# Patient Record
Sex: MALE | Race: WHITE | NOT HISPANIC OR LATINO | Employment: OTHER | URBAN - METROPOLITAN AREA
[De-identification: names, ages, dates, MRNs, and addresses within clinical notes are randomized per-mention and may not be internally consistent; named-entity substitution may affect disease eponyms.]

---

## 2018-01-03 ENCOUNTER — ALLSCRIPTS OFFICE VISIT (OUTPATIENT)
Dept: OTHER | Facility: OTHER | Age: 83
End: 2018-01-03

## 2018-01-03 LAB
BILIRUB UR QL STRIP: NORMAL
CLARITY UR: NORMAL
COLOR UR: YELLOW
GLUCOSE (HISTORICAL): NORMAL
HGB UR QL STRIP.AUTO: NORMAL
KETONES UR STRIP-MCNC: NORMAL MG/DL
LEUKOCYTE ESTERASE UR QL STRIP: NORMAL
NITRITE UR QL STRIP: NORMAL
PH UR STRIP.AUTO: 7.5 [PH]
PROT UR STRIP-MCNC: NORMAL MG/DL
SP GR UR STRIP.AUTO: 1.01
UROBILINOGEN UR QL STRIP.AUTO: 0.2

## 2018-01-22 VITALS
DIASTOLIC BLOOD PRESSURE: 76 MMHG | SYSTOLIC BLOOD PRESSURE: 130 MMHG | WEIGHT: 191 LBS | HEIGHT: 70 IN | BODY MASS INDEX: 27.35 KG/M2

## 2018-02-06 ENCOUNTER — OFFICE VISIT (OUTPATIENT)
Dept: UROLOGY | Facility: MEDICAL CENTER | Age: 83
End: 2018-02-06
Payer: COMMERCIAL

## 2018-02-06 VITALS
HEIGHT: 70 IN | DIASTOLIC BLOOD PRESSURE: 76 MMHG | SYSTOLIC BLOOD PRESSURE: 130 MMHG | BODY MASS INDEX: 27.2 KG/M2 | WEIGHT: 190 LBS

## 2018-02-06 DIAGNOSIS — R33.9 INCOMPLETE BLADDER EMPTYING: Primary | ICD-10-CM

## 2018-02-06 DIAGNOSIS — R31.0 GROSS HEMATURIA: ICD-10-CM

## 2018-02-06 LAB
POST-VOID RESIDUAL VOLUME, ML POC: 86 ML
SL AMB  POCT GLUCOSE, UA: ABNORMAL
SL AMB LEUKOCYTE ESTERASE,UA: ABNORMAL
SL AMB POCT BILIRUBIN,UA: ABNORMAL
SL AMB POCT BLOOD,UA: ABNORMAL
SL AMB POCT CLARITY,UA: CLEAR
SL AMB POCT COLOR,UA: YELLOW
SL AMB POCT KETONES,UA: ABNORMAL
SL AMB POCT NITRITE,UA: ABNORMAL
SL AMB POCT PH,UA: 6.5
SL AMB POCT SPECIFIC GRAVITY,UA: 1.01
SL AMB POCT URINE PROTEIN: ABNORMAL
SL AMB POCT UROBILINOGEN: 0.2

## 2018-02-06 PROCEDURE — 81003 URINALYSIS AUTO W/O SCOPE: CPT | Performed by: UROLOGY

## 2018-02-06 PROCEDURE — 99214 OFFICE O/P EST MOD 30 MIN: CPT | Performed by: UROLOGY

## 2018-02-06 PROCEDURE — 51798 US URINE CAPACITY MEASURE: CPT | Performed by: UROLOGY

## 2018-02-06 RX ORDER — CEPHALEXIN 500 MG/1
500 CAPSULE ORAL EVERY 12 HOURS SCHEDULED
Qty: 14 CAPSULE | Refills: 0 | Status: SHIPPED | OUTPATIENT
Start: 2018-02-06 | End: 2018-02-13

## 2018-02-06 RX ORDER — TAMSULOSIN HYDROCHLORIDE 0.4 MG/1
CAPSULE ORAL
COMMUNITY
Start: 2018-02-05 | End: 2019-09-27 | Stop reason: SDUPTHER

## 2018-02-06 RX ORDER — CHOLECALCIFEROL (VITAMIN D3) 125 MCG
500 CAPSULE ORAL
COMMUNITY
End: 2020-08-12

## 2018-02-06 RX ORDER — LOSARTAN POTASSIUM 50 MG/1
TABLET ORAL
COMMUNITY
End: 2020-08-12 | Stop reason: SDUPTHER

## 2018-02-06 RX ORDER — OMEPRAZOLE 40 MG/1
CAPSULE, DELAYED RELEASE ORAL
COMMUNITY
End: 2020-08-13 | Stop reason: SDUPTHER

## 2018-02-06 RX ORDER — SIMVASTATIN 10 MG
TABLET ORAL
COMMUNITY
Start: 2018-01-09 | End: 2020-08-13

## 2018-02-06 NOTE — PATIENT INSTRUCTIONS
Hematuria   WHAT YOU NEED TO KNOW:   What is hematuria? Hematuria is blood in your urine  Your urine may be bright red to dark brown  What other signs and symptoms might I have with hematuria? · Fever     · Nausea and vomiting     · Pain or bruising on your lower back or sides     · Pain when you urinate     · More urination than usual, or the need to urinate right away  What causes hematuria? Ask your healthcare provider for more information about these and other causes of hematuria:  · Urinary tract infection    · Kidney or bladder stones    · Swollen prostate    · Kidney disease    · Abdomen or pelvic injury    · Kidney, bladder, or prostate cancer    · Intense exercise  How is hematuria diagnosed? Your healthcare provider will ask when you first saw a change in the color of your urine  Tell him about any medical conditions or medicines you take  Some medicines can damage your kidneys or increase your risk for bleeding  You may need any of the following:  · Blood and urine tests  may show infection and how well your kidneys are working  · An x-ray, ultrasound, or CT  may show the cause of your hematuria  You may be given contrast liquid to help your urinary tract show up better in the pictures  Tell the healthcare provider if you have ever had an allergic reaction to contrast liquid  How is hematuria treated? Hematuria may go away without treatment  You may need medicines to treat an infection  Treatment depends on the cause of your hematuria  Ask your healthcare provider for more information about the treatment you may need  How can I manage my symptoms? Drink liquids as directed  You may need to drink extra liquids to help flush the blood from your body through your urine  Water is the best liquid to drink  Ask how much liquid to drink each day and which liquids are best for you  When should I seek immediate care? · You have blood in your urine after a new injury, such as a fall       · You are urinating very small amounts or not at all  · You feel like you cannot empty your bladder  · You have severe back or side pain that does not go away with treatment  When should I contact my healthcare provider? · You have a fever that gets worse or does not go away with treatment  · You cannot keep liquids or medicines down  · Your urine gets darker, even after you drink extra liquids  · You have questions or concerns about your condition, treatment, or care  CARE AGREEMENT:   You have the right to help plan your care  Learn about your health condition and how it may be treated  Discuss treatment options with your caregivers to decide what care you want to receive  You always have the right to refuse treatment  The above information is an  only  It is not intended as medical advice for individual conditions or treatments  Talk to your doctor, nurse or pharmacist before following any medical regimen to see if it is safe and effective for you  © 2017 [employer] Information is for End User's use only and may not be sold, redistributed or otherwise used for commercial purposes  All illustrations and images included in CareNotes® are the copyrighted property of A D A M [employer]  or [employer]

## 2018-02-06 NOTE — LETTER
February 6, 2018     Teresa Mcnamara MD  [address]  [address] [facility] [address] [facility] [address]    Patient: Kristan Estrada   YOB: 1935   Date of Visit: 2/6/2018       Dear Dr Carrera Schools: Thank you for referring Kristan Estrada to me for evaluation  Below are my notes for this consultation  If you have questions, please do not hesitate to call me  I look forward to following your patient along with you           Sincerely,        Tim Cowart MD        CC: No Recipients

## 2018-02-06 NOTE — ASSESSMENT & PLAN NOTE
This is a new problem  The patient is a smoker  We will workup with CT scan and cystoscopy  We will also check a urine culture and empirically begin antibiotic therapy

## 2018-02-06 NOTE — PROGRESS NOTES
Assessment/Plan:    Gross hematuria  This is a new problem  The patient is a smoker  We will workup with CT scan and cystoscopy  We will also check a urine culture and empirically begin antibiotic therapy  Diagnoses and all orders for this visit:    Incomplete bladder emptying  -     POCT Measure PVR    Gross hematuria  -     POCT urine dip auto non-scope  -     Urine culture  -     CT abdomen pelvis w wo contrast; Future  -     cephalexin (KEFLEX) 500 mg capsule; Take 1 capsule (500 mg total) by mouth every 12 (twelve) hours for 7 days    Other orders  -     VENTOLIN  (90 Base) MCG/ACT inhaler;   -     aspirin 81 MG tablet; Take 81 mg by mouth  -     CHOLECALCIFEROL PO; Take 1,000 Units by mouth  -     cyanocobalamin (VITAMIN B-12) 500 mcg tablet; Take 500 mcg by mouth  -     tamsulosin (FLOMAX) 0 4 mg;   -     simvastatin (ZOCOR) 10 mg tablet; 1 po 2 x per week  -     omeprazole (PriLOSEC) 40 MG capsule; Take by mouth  -     metFORMIN (GLUCOPHAGE) 500 mg tablet; Take by mouth  -     losartan (COZAAR) 50 mg tablet; Take by mouth          Subjective:      Patient ID: Jhoan Thomas is a [de-identified] y o  male  21-year-old male who developed urinary frequency,  urgency and gross hematuria last night  He felt he could not empty his bladder well  No fever or flank pain  He continued to void frequently although he does note that his symptoms are improving  His gross  hematuria has resolved  At the present time he feels his bladder emptying is improving  The following portions of the patient's history were reviewed and updated as appropriate: allergies, current medications, past family history, past medical history, past social history, past surgical history and problem list     Review of Systems   Constitutional: Negative for chills, diaphoresis, fatigue and fever  HENT: Negative  Eyes: Negative  Respiratory: Negative  Cardiovascular: Negative  Endocrine: Negative      Musculoskeletal: Negative  Skin: Negative  Allergic/Immunologic: Negative  Neurological: Negative  Hematological: Negative  Psychiatric/Behavioral: Negative  Objective:     Physical Exam   Constitutional: He is oriented to person, place, and time  He appears well-developed and well-nourished  HENT:   Head: Normocephalic and atraumatic  Eyes: Conjunctivae are normal    Neck: Neck supple  Cardiovascular: Normal rate  Pulmonary/Chest: Effort normal    Abdominal: Soft  He exhibits no distension and no mass  There is no tenderness  There is no rebound and no guarding  No CVA tenderness   Genitourinary: Penis normal    Genitourinary Comments: Testes descended and nontender   Neurological: He is alert and oriented to person, place, and time  Skin: Skin is warm and dry  Psychiatric: He has a normal mood and affect   His behavior is normal  Judgment and thought content normal

## 2018-02-08 ENCOUNTER — APPOINTMENT (OUTPATIENT)
Dept: LAB | Facility: MEDICAL CENTER | Age: 83
End: 2018-02-08
Attending: UROLOGY
Payer: COMMERCIAL

## 2018-02-08 ENCOUNTER — TRANSCRIBE ORDERS (OUTPATIENT)
Dept: ADMINISTRATIVE | Facility: HOSPITAL | Age: 83
End: 2018-02-08

## 2018-02-08 DIAGNOSIS — R31.0 GROSS HEMATURIA: ICD-10-CM

## 2018-02-08 LAB
ANION GAP SERPL CALCULATED.3IONS-SCNC: 7 MMOL/L (ref 4–13)
BUN SERPL-MCNC: 19 MG/DL (ref 5–25)
CALCIUM SERPL-MCNC: 9.3 MG/DL (ref 8.3–10.1)
CHLORIDE SERPL-SCNC: 103 MMOL/L (ref 100–108)
CO2 SERPL-SCNC: 29 MMOL/L (ref 21–32)
CREAT SERPL-MCNC: 1.06 MG/DL (ref 0.6–1.3)
GFR SERPL CREATININE-BSD FRML MDRD: 66 ML/MIN/1.73SQ M
GLUCOSE P FAST SERPL-MCNC: 118 MG/DL (ref 65–99)
POTASSIUM SERPL-SCNC: 4 MMOL/L (ref 3.5–5.3)
SODIUM SERPL-SCNC: 139 MMOL/L (ref 136–145)

## 2018-02-08 PROCEDURE — 36415 COLL VENOUS BLD VENIPUNCTURE: CPT

## 2018-02-08 PROCEDURE — 87086 URINE CULTURE/COLONY COUNT: CPT | Performed by: UROLOGY

## 2018-02-08 PROCEDURE — 80048 BASIC METABOLIC PNL TOTAL CA: CPT

## 2018-02-09 LAB — BACTERIA UR CULT: NORMAL

## 2018-02-15 ENCOUNTER — HOSPITAL ENCOUNTER (OUTPATIENT)
Dept: CT IMAGING | Facility: HOSPITAL | Age: 83
Discharge: HOME | End: 2018-02-15
Attending: UROLOGY
Payer: COMMERCIAL

## 2018-02-15 DIAGNOSIS — R31.0 GROSS HEMATURIA: ICD-10-CM

## 2018-02-15 PROCEDURE — 74178 CT ABD&PLV WO CNTR FLWD CNTR: CPT

## 2018-02-15 RX ADMIN — IOHEXOL 100 ML: 350 INJECTION, SOLUTION INTRAVENOUS at 08:41

## 2018-02-21 ENCOUNTER — TELEPHONE (OUTPATIENT)
Dept: UROLOGY | Facility: AMBULATORY SURGERY CENTER | Age: 83
End: 2018-02-21

## 2018-02-21 NOTE — TELEPHONE ENCOUNTER
Spoke to patient who wanted to know what the next step is  Looking at Dr Eliza Hooper last note PT was to schedule a Cysto  Pt made an appt 3/7

## 2018-03-06 ENCOUNTER — PROCEDURE VISIT (OUTPATIENT)
Dept: UROLOGY | Facility: MEDICAL CENTER | Age: 83
End: 2018-03-06
Payer: COMMERCIAL

## 2018-03-06 VITALS
HEIGHT: 70 IN | WEIGHT: 190 LBS | BODY MASS INDEX: 27.2 KG/M2 | SYSTOLIC BLOOD PRESSURE: 116 MMHG | DIASTOLIC BLOOD PRESSURE: 70 MMHG

## 2018-03-06 DIAGNOSIS — N40.1 BENIGN PROSTATIC HYPERPLASIA WITH URINARY OBSTRUCTION: ICD-10-CM

## 2018-03-06 DIAGNOSIS — R31.0 GROSS HEMATURIA: Primary | ICD-10-CM

## 2018-03-06 DIAGNOSIS — N13.8 BENIGN PROSTATIC HYPERPLASIA WITH URINARY OBSTRUCTION: ICD-10-CM

## 2018-03-06 PROBLEM — E11.9 DM2 (DIABETES MELLITUS, TYPE 2) (HCC): Status: ACTIVE | Noted: 2017-12-27

## 2018-03-06 PROBLEM — K21.9 CHRONIC GERD: Status: ACTIVE | Noted: 2017-12-27

## 2018-03-06 LAB
SL AMB  POCT GLUCOSE, UA: NORMAL
SL AMB LEUKOCYTE ESTERASE,UA: NORMAL
SL AMB POCT BILIRUBIN,UA: NORMAL
SL AMB POCT BLOOD,UA: NORMAL
SL AMB POCT CLARITY,UA: CLEAR
SL AMB POCT COLOR,UA: YELLOW
SL AMB POCT KETONES,UA: NORMAL
SL AMB POCT NITRITE,UA: NORMAL
SL AMB POCT PH,UA: 6
SL AMB POCT SPECIFIC GRAVITY,UA: 1.02
SL AMB POCT URINE PROTEIN: NORMAL
SL AMB POCT UROBILINOGEN: 0.2

## 2018-03-06 PROCEDURE — 81003 URINALYSIS AUTO W/O SCOPE: CPT | Performed by: UROLOGY

## 2018-03-06 PROCEDURE — 99213 OFFICE O/P EST LOW 20 MIN: CPT | Performed by: UROLOGY

## 2018-03-06 PROCEDURE — 52000 CYSTOURETHROSCOPY: CPT | Performed by: UROLOGY

## 2018-03-06 PROCEDURE — 4040F PNEUMOC VAC/ADMIN/RCVD: CPT | Performed by: UROLOGY

## 2018-03-06 NOTE — ASSESSMENT & PLAN NOTE
CT scan reveals only bladder wall thickening  A urine culture is negative  Cystoscopy reveals prostatic enlargement and vascularity without any evidence of bladder tumor  Most likely etiology of the gross hematuria was Infectious or prostatic enlargement

## 2018-03-06 NOTE — ASSESSMENT & PLAN NOTE
He remains on TAMSULOSIN  We did discuss adding finasteride however at this point he is satisfied with his voiding pattern  Would consider this further if he has recurrent episodes of gross hematuria or if he is unhappy with his voiding pattern  Alternatively, could consider transurethral resection of the prostate  He will return in 1 year  He will call if bleeding recurs or if he is unhappy with his voiding pattern  We discussed the pros and cons of PSA testing and age [de-identified]

## 2018-03-06 NOTE — PROGRESS NOTES
Assessment/Plan:    Gross hematuria  CT scan reveals only bladder wall thickening  A urine culture is negative  Cystoscopy reveals prostatic enlargement and vascularity without any evidence of bladder tumor  Most likely etiology of the gross hematuria was Infectious or prostatic enlargement  Benign prostatic hyperplasia with urinary obstruction  He remains on TAMSULOSIN  We did discuss adding finasteride however at this point he is satisfied with his voiding pattern  Would consider this further if he has recurrent episodes of gross hematuria or if he is unhappy with his voiding pattern  Alternatively, could consider transurethral resection of the prostate  He will return in 1 year  He will call if bleeding recurs or if he is unhappy with his voiding pattern  We discussed the pros and cons of PSA testing and age [de-identified]  Diagnoses and all orders for this visit:    Gross hematuria  -     POCT urine dip auto non-scope    Benign prostatic hyperplasia with urinary obstruction    Other orders  -     Cystoscopy          Subjective:      Patient ID: Marco Flores is a [de-identified] y o  male  19-year-old male followed for lower tract symptoms who recently noted gross hematuria  He was treated with antibiotic and he reports that the hematuria has completely resolved  He notes occasional episodes of incomplete bladder emptying  His stream is adequate  He continues to take TAMSULOSIN  He he gets up at most once a night to urinate  He is generally satisfied with his voiding pattern  He returns today for cystoscopy  The following portions of the patient's history were reviewed and updated as appropriate: allergies, current medications, past family history, past medical history, past social history, past surgical history and problem list     Review of Systems   Constitutional: Negative for chills, diaphoresis, fatigue and fever  HENT: Negative  Eyes: Negative  Respiratory: Negative      Cardiovascular: Negative  Endocrine: Negative  Musculoskeletal: Negative  Skin: Negative  Allergic/Immunologic: Negative  Neurological: Negative  Hematological: Negative  Psychiatric/Behavioral: Negative  Objective:      /70 (BP Location: Left arm, Patient Position: Sitting)   Ht 5' 10" (1 778 m)   Wt 86 2 kg (190 lb)   BMI 27 26 kg/m²          Physical Exam   Constitutional: He is oriented to person, place, and time  He appears well-developed and well-nourished  HENT:   Head: Normocephalic and atraumatic  Neck: Neck supple  Cardiovascular: Normal rate  Pulmonary/Chest: Effort normal    Abdominal: Soft  He exhibits no distension and no mass  There is no tenderness  There is no rebound  Genitourinary: Penis normal    Neurological: He is alert and oriented to person, place, and time  Skin: Skin is warm and dry  Psychiatric: He has a normal mood and affect  His behavior is normal  Judgment and thought content normal    Vitals reviewed  Cystoscopy  Date/Time: 3/6/2018 10:36 AM  Performed by: More Cavanaugh  Authorized by: More Cavanaugh     Procedure details: cystoscopy    Patient tolerance: Patient tolerated the procedure well with no immediate complications    Additional Procedure Details: Cystoscopy Procedure Note        Pre-operative Diagnosis: Hematuria    Post-operative Diagnosis:  Prostatic enlargement  No evidence of bladder tumor      Procedure Details   The risks, benefits, complications, treatment options, and expected outcomes were discussed with the patient  The patient concurred with the proposed plan, giving informed consent  Cystoscopy was performed today under local anesthesia, using sterile technique  The patient was placed in the supine position, prepped and draped in the usual sterile fashion  A 15 Divehi flexible cystoscope  was used to inspect both the urethra and bladder    Findings:  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

## 2018-03-06 NOTE — PATIENT INSTRUCTIONS

## 2018-03-06 NOTE — LETTER
March 6, 2018     Patsie Brunner, MD  [address]  [address] [facility] [address] [facility] [address]    Patient: Jennifer Bautista   YOB: 1935   Date of Visit: 3/6/2018       Dear Dr Xin Rutledge: Thank you for referring Jennifer Bautista to me for evaluation  Below are my notes for this consultation  If you have questions, please do not hesitate to call me  I look forward to following your patient along with you           Sincerely,        Salima Marquez MD        CC: No Recipients

## 2018-12-26 ENCOUNTER — OFFICE VISIT (OUTPATIENT)
Dept: UROLOGY | Facility: MEDICAL CENTER | Age: 83
End: 2018-12-26
Payer: COMMERCIAL

## 2018-12-26 ENCOUNTER — TELEPHONE (OUTPATIENT)
Dept: UROLOGY | Facility: MEDICAL CENTER | Age: 83
End: 2018-12-26

## 2018-12-26 VITALS
BODY MASS INDEX: 26.48 KG/M2 | HEIGHT: 70 IN | HEART RATE: 84 BPM | DIASTOLIC BLOOD PRESSURE: 70 MMHG | WEIGHT: 185 LBS | SYSTOLIC BLOOD PRESSURE: 130 MMHG

## 2018-12-26 DIAGNOSIS — N40.1 BPH WITH URINARY OBSTRUCTION: ICD-10-CM

## 2018-12-26 DIAGNOSIS — R35.0 FREQUENCY OF URINATION: Primary | ICD-10-CM

## 2018-12-26 DIAGNOSIS — N52.8 OTHER MALE ERECTILE DYSFUNCTION: ICD-10-CM

## 2018-12-26 DIAGNOSIS — N13.8 BPH WITH URINARY OBSTRUCTION: ICD-10-CM

## 2018-12-26 LAB
SL AMB  POCT GLUCOSE, UA: ABNORMAL
SL AMB LEUKOCYTE ESTERASE,UA: ABNORMAL
SL AMB POCT BILIRUBIN,UA: ABNORMAL
SL AMB POCT BLOOD,UA: ABNORMAL
SL AMB POCT CLARITY,UA: CLEAR
SL AMB POCT COLOR,UA: YELLOW
SL AMB POCT KETONES,UA: ABNORMAL
SL AMB POCT NITRITE,UA: ABNORMAL
SL AMB POCT PH,UA: 7.5
SL AMB POCT SPECIFIC GRAVITY,UA: 1.01
SL AMB POCT URINE PROTEIN: ABNORMAL
SL AMB POCT UROBILINOGEN: 0.2

## 2018-12-26 PROCEDURE — 87077 CULTURE AEROBIC IDENTIFY: CPT | Performed by: UROLOGY

## 2018-12-26 PROCEDURE — 81003 URINALYSIS AUTO W/O SCOPE: CPT | Performed by: UROLOGY

## 2018-12-26 PROCEDURE — 87086 URINE CULTURE/COLONY COUNT: CPT | Performed by: UROLOGY

## 2018-12-26 PROCEDURE — 99214 OFFICE O/P EST MOD 30 MIN: CPT | Performed by: UROLOGY

## 2018-12-26 PROCEDURE — 87186 SC STD MICRODIL/AGAR DIL: CPT | Performed by: UROLOGY

## 2018-12-26 RX ORDER — FINASTERIDE 5 MG/1
5 TABLET, FILM COATED ORAL DAILY
Qty: 90 TABLET | Refills: 3 | Status: SHIPPED | OUTPATIENT
Start: 2018-12-26 | End: 2019-09-27 | Stop reason: SDUPTHER

## 2018-12-26 RX ORDER — SULFAMETHOXAZOLE AND TRIMETHOPRIM 800; 160 MG/1; MG/1
1 TABLET ORAL EVERY 12 HOURS SCHEDULED
Qty: 14 TABLET | Refills: 0 | Status: SHIPPED | OUTPATIENT
Start: 2018-12-26 | End: 2019-01-02

## 2018-12-26 NOTE — LETTER
December 26, 2018     Faustina Mujica MD  [address]  [address] [place] [address],[address] [facility] [address]    Patient: Jeremiah Jon   YOB: 1935   Date of Visit: 12/26/2018       Dear Dr Herrera Hutson: Thank you for referring Jeremiah Jon to me for evaluation  Below are my notes for this consultation  If you have questions, please do not hesitate to call me  I look forward to following your patient along with you  Sincerely,        Mabelene Kocher, MD        CC: No Recipients  Mabelene Kocher, MD  12/26/2018 11:20 AM  Sign at close encounter  Assessment/Plan:  Possible UTI, culture and start Bactrim twice per day for seven days    Add finasteride to his Flomax    Keep routine follow-up  No problem-specific Assessment & Plan notes found for this encounter  Diagnoses and all orders for this visit:    Frequency of urination  -     POCT urine dip auto non-scope  -     Urine culture  -     sulfamethoxazole-trimethoprim (BACTRIM DS) 800-160 mg per tablet; Take 1 tablet by mouth every 12 (twelve) hours for 7 days    BPH with urinary obstruction  -     finasteride (PROSCAR) 5 mg tablet; Take 1 tablet (5 mg total) by mouth daily    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          Subjective:      Patient ID: Jeremiah Jon is a 82 y o  male  Several days of morning time tremendous urgency voiding small amounts frequently dysuria  Symptoms get slightly better toward later in the day, but this is definitely different than his usual BPH frequency and slower flow  Feels just like infection he had two years ago  See prior cysto note described enlarged prostate, and options for therapy  Patient is not sure if the Flomax has helped very much          The following portions of the patient's history were reviewed and updated as appropriate: allergies, current medications, past family history, past medical history, past social history, past surgical history and problem list     Review of Systems   All other systems reviewed and are negative  Objective:      /70 (BP Location: Left arm, Patient Position: Sitting, Cuff Size: Adult)   Pulse 84   Ht 5' 10" (1 778 m)   Wt 83 9 kg (185 lb)   BMI 26 54 kg/m²           Physical Exam   Constitutional: He is oriented to person, place, and time  He appears well-developed and well-nourished  No distress  HENT:   Head: Normocephalic and atraumatic  Eyes: Conjunctivae are normal    Cardiovascular: Normal rate and regular rhythm  Pulmonary/Chest: Effort normal and breath sounds normal  No respiratory distress  He has no wheezes  Abdominal: Soft  Bowel sounds are normal  He exhibits no distension and no mass  There is no tenderness  Neurological: He is alert and oriented to person, place, and time  Skin: Skin is warm and dry  He is not diaphoretic

## 2018-12-26 NOTE — PROGRESS NOTES
Assessment/Plan:  Possible UTI, culture and start Bactrim twice per day for seven days    Add finasteride to his Flomax    Keep routine follow-up  No problem-specific Assessment & Plan notes found for this encounter  Diagnoses and all orders for this visit:    Frequency of urination  -     POCT urine dip auto non-scope  -     Urine culture  -     sulfamethoxazole-trimethoprim (BACTRIM DS) 800-160 mg per tablet; Take 1 tablet by mouth every 12 (twelve) hours for 7 days    BPH with urinary obstruction  -     finasteride (PROSCAR) 5 mg tablet; Take 1 tablet (5 mg total) by mouth daily    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          Subjective:      Patient ID: Nani Rodrigues is a 82 y o  male  Several days of morning time tremendous urgency voiding small amounts frequently dysuria  Symptoms get slightly better toward later in the day, but this is definitely different than his usual BPH frequency and slower flow  Feels just like infection he had two years ago  See prior cysto note described enlarged prostate, and options for therapy  Patient is not sure if the Flomax has helped very much  The following portions of the patient's history were reviewed and updated as appropriate: allergies, current medications, past family history, past medical history, past social history, past surgical history and problem list     Review of Systems   All other systems reviewed and are negative  Objective:      /70 (BP Location: Left arm, Patient Position: Sitting, Cuff Size: Adult)   Pulse 84   Ht 5' 10" (1 778 m)   Wt 83 9 kg (185 lb)   BMI 26 54 kg/m²          Physical Exam   Constitutional: He is oriented to person, place, and time  He appears well-developed and well-nourished  No distress  HENT:   Head: Normocephalic and atraumatic  Eyes: Conjunctivae are normal    Cardiovascular: Normal rate and regular rhythm  Pulmonary/Chest: Effort normal and breath sounds normal  No respiratory distress  He has no wheezes  Abdominal: Soft  Bowel sounds are normal  He exhibits no distension and no mass  There is no tenderness  Neurological: He is alert and oriented to person, place, and time  Skin: Skin is warm and dry  He is not diaphoretic

## 2018-12-26 NOTE — TELEPHONE ENCOUNTER
Leandrew Males questioning script for Bactrim and if pt had recent blood work potassium level checked?

## 2018-12-28 LAB — BACTERIA UR CULT: ABNORMAL

## 2019-03-12 ENCOUNTER — OFFICE VISIT (OUTPATIENT)
Dept: UROLOGY | Facility: MEDICAL CENTER | Age: 84
End: 2019-03-12
Payer: COMMERCIAL

## 2019-03-12 VITALS
SYSTOLIC BLOOD PRESSURE: 140 MMHG | HEART RATE: 64 BPM | BODY MASS INDEX: 26.48 KG/M2 | HEIGHT: 70 IN | DIASTOLIC BLOOD PRESSURE: 82 MMHG | WEIGHT: 185 LBS

## 2019-03-12 DIAGNOSIS — N13.8 BPH WITH URINARY OBSTRUCTION: Primary | ICD-10-CM

## 2019-03-12 DIAGNOSIS — N40.1 BPH WITH URINARY OBSTRUCTION: Primary | ICD-10-CM

## 2019-03-12 PROBLEM — R31.0 GROSS HEMATURIA: Status: RESOLVED | Noted: 2018-02-06 | Resolved: 2019-03-12

## 2019-03-12 LAB
SL AMB  POCT GLUCOSE, UA: NORMAL
SL AMB LEUKOCYTE ESTERASE,UA: NORMAL
SL AMB POCT BILIRUBIN,UA: NORMAL
SL AMB POCT BLOOD,UA: NORMAL
SL AMB POCT CLARITY,UA: CLEAR
SL AMB POCT COLOR,UA: YELLOW
SL AMB POCT KETONES,UA: NORMAL
SL AMB POCT NITRITE,UA: NORMAL
SL AMB POCT PH,UA: 7
SL AMB POCT SPECIFIC GRAVITY,UA: 1.02
SL AMB POCT URINE PROTEIN: NORMAL
SL AMB POCT UROBILINOGEN: 0.2

## 2019-03-12 PROCEDURE — 99214 OFFICE O/P EST MOD 30 MIN: CPT | Performed by: UROLOGY

## 2019-03-12 PROCEDURE — 81003 URINALYSIS AUTO W/O SCOPE: CPT | Performed by: UROLOGY

## 2019-03-12 NOTE — ASSESSMENT & PLAN NOTE
The patient was placed on finasteride in December after he had a urinary infection  He is now on combined therapy  AUA symptom score is 18  He is mixed about his quality of life  Urinalysis today is negative  I discussed other options for therapy including minimally invasive treatments like urolift  Information was given  The patient will return in 6 months  By that time the finasteride should be fully effective  He will call if problems arise

## 2019-03-12 NOTE — PATIENT INSTRUCTIONS
Benign Prostatic Hypertrophy   WHAT YOU NEED TO KNOW:   Benign prostatic hypertrophy (BPH) is a condition that causes your prostate gland to grow larger than normal  The prostate gland is the male sex gland that produces a fluid that is part of semen  It is about the size of a walnut and it is located under the bladder  As the prostate grows, it can squeeze the urethra  This can block urine flow and cause urinary problems  DISCHARGE INSTRUCTIONS:   Medicines:   · Alpha blockers: This medicine relaxes the muscles in your prostate and bladder  It may help you urinate more easily  · 5 alpha reductase inhibitors: These medicines block the production of a hormone that causes the prostate to get larger  It may help slow the growth of the prostate or shrink the prostate  · Take your medicine as directed  Contact your healthcare provider if you think your medicine is not helping or if you have side effects  Tell him or her if you are allergic to any medicine  Keep a list of the medicines, vitamins, and herbs you take  Include the amounts, and when and why you take them  Bring the list or the pill bottles to follow-up visits  Carry your medicine list with you in case of an emergency  Follow up with your healthcare provider as directed:  Write down your questions so you remember to ask them during your visits  Manage BPH:   · Do not let your bladder get too full before you empty it  Urinate when you feel the urge  · Limit alcohol  Do not drink large amounts of any liquid at one time  · Decrease the amount of salt you eat  Examples of salty foods are chips, cured meats, and canned soups  Do not use table salt  · Healthcare providers may tell you not to eat spicy foods such as chilli peppers  This may help you find out if spicy food makes your BPH symptoms worse  · You may have sex if you feel well  Contact your healthcare provider if:   · There is a large amount of blood in your urine  · Your signs and symptoms get worse  · You have a fever  · You have questions or concerns about your condition or care  Seek care immediately if:   · You are unable to urinate  · Your bladder feels very full and painful  © 2017 [employer] Information is for End User's use only and may not be sold, redistributed or otherwise used for commercial purposes  All illustrations and images included in CareNotes® are the copyrighted property of A [employer] M [employer]  or Micky Lucia  The above information is an  only  It is not intended as medical advice for individual conditions or treatments  Talk to your doctor, nurse or pharmacist before following any medical regimen to see if it is safe and effective for you    Urolift information was given

## 2019-03-12 NOTE — LETTER
March 12, 2019     Perla Roach MD  [id number] [facility] [address]    Patient: Lela Gifford   YOB: 1935   Date of Visit: 3/12/2019       Dear Dr Raven De Jesus: Thank you for referring Lela Gifford to me for evaluation  Below are my notes for this consultation  If you have questions, please do not hesitate to call me  I look forward to following your patient along with you  Sincerely,        Cyn Rodas MD        CC: No Recipients  Cyn Rodas MD  3/12/2019 10:50 AM  Sign at close encounter  Assessment/Plan:    BPH with urinary obstruction  The patient was placed on finasteride in December after he had a urinary infection  He is now on combined therapy  AUA symptom score is 18  He is mixed about his quality of life  Urinalysis today is negative  I discussed other options for therapy including minimally invasive treatments like urolift  Information was given  The patient will return in 6 months  By that time the finasteride should be fully effective  He will call if problems arise  Diagnoses and all orders for this visit:    BPH with urinary obstruction  -     POCT urine dip auto non-scope          Subjective:      Patient ID: Lela Gifford is a 82 y o  male  Benign Prostatic Hypertrophy   This is a chronic problem  The current episode started more than 1 year ago  The problem has been gradually improving since onset  Irritative symptoms do not include frequency or urgency  nocturia 1-0, occasional urgency  Obstructive symptoms include a slower stream  Obstructive symptoms do not include an intermittent stream, straining or a weak stream  Pertinent negatives include no chills, dysuria, genital pain, hematuria, hesitancy, nausea or vomiting  AUA score is 8-19  His sexual activity is non-contributory to the current illness  The symptoms are aggravated by caffeine  Past treatments include finasteride and tamsulosin   The treatment provided mild relief  The following portions of the patient's history were reviewed and updated as appropriate: allergies, current medications, past family history, past medical history, past social history, past surgical history and problem list     Review of Systems   Constitutional: Negative  Negative for chills, diaphoresis, fatigue and fever  HENT: Negative  Eyes: Negative  Respiratory: Negative  Cardiovascular: Negative  Gastrointestinal: Negative  Negative for nausea and vomiting  Endocrine: Negative  Genitourinary: Negative for dysuria, frequency, hematuria, hesitancy and urgency  See HPI   Musculoskeletal: Negative  Skin: Negative  Allergic/Immunologic: Negative  Neurological: Negative  Hematological: Negative  Psychiatric/Behavioral: Negative  AUA SYMPTOM SCORE      Most Recent Value   AUA SYMPTOM SCORE   How often have you had a sensation of not emptying your bladder completely after you finished urinating? 4   How often have you had to urinate again less than two hours after you finished urinating? 5   How often have you found you stopped and started again several times when you urinate? 2   How often have you found it difficult to postpone urination? 3   How often have you had a weak urinary stream?  3   How often have you had to push or strain to begin urination? 0   How many times did you most typically get up to urinate from the time you went to bed at night until the time you got up in the morning? 1   Quality of Life: If you were to spend the rest of your life with your urinary condition just the way it is now, how would you feel about that?  3   AUA SYMPTOM SCORE  18        Objective:      /82 (BP Location: Left arm, Patient Position: Sitting, Cuff Size: Adult)   Pulse 64   Ht 5' 10" (1 778 m)   Wt 83 9 kg (185 lb)   BMI 26 54 kg/m²           Physical Exam   Constitutional: He is oriented to person, place, and time   He appears well-developed and well-nourished  HENT:   Head: Normocephalic and atraumatic  Eyes: Conjunctivae are normal    Neck: Neck supple  Cardiovascular: Normal rate  Pulmonary/Chest: Effort normal    Abdominal: Soft  Bowel sounds are normal  He exhibits no distension and no mass  There is no tenderness  There is no rebound, no guarding and no CVA tenderness  Genitourinary: Rectum normal, testes normal and penis normal  Right testis shows no mass  Left testis shows no mass  No phimosis or hypospadias  Genitourinary Comments: Prostate moderately enlarged and palpably benign   Musculoskeletal: He exhibits no edema  Neurological: He is alert and oriented to person, place, and time  Skin: Skin is warm and dry  Psychiatric: He has a normal mood and affect  His behavior is normal  Judgment and thought content normal    Vitals reviewed

## 2019-03-12 NOTE — PROGRESS NOTES
Assessment/Plan:    BPH with urinary obstruction  The patient was placed on finasteride in December after he had a urinary infection  He is now on combined therapy  AUA symptom score is 18  He is mixed about his quality of life  Urinalysis today is negative  I discussed other options for therapy including minimally invasive treatments like urolift  Information was given  The patient will return in 6 months  By that time the finasteride should be fully effective  He will call if problems arise  Diagnoses and all orders for this visit:    BPH with urinary obstruction  -     POCT urine dip auto non-scope          Subjective:      Patient ID: Deanna Nieves is a 82 y o  male  Benign Prostatic Hypertrophy   This is a chronic problem  The current episode started more than 1 year ago  The problem has been gradually improving since onset  Irritative symptoms do not include frequency or urgency  nocturia 1-0, occasional urgency  Obstructive symptoms include a slower stream  Obstructive symptoms do not include an intermittent stream, straining or a weak stream  Pertinent negatives include no chills, dysuria, genital pain, hematuria, hesitancy, nausea or vomiting  AUA score is 8-19  His sexual activity is non-contributory to the current illness  The symptoms are aggravated by caffeine  Past treatments include finasteride and tamsulosin  The treatment provided mild relief  The following portions of the patient's history were reviewed and updated as appropriate: allergies, current medications, past family history, past medical history, past social history, past surgical history and problem list     Review of Systems   Constitutional: Negative  Negative for chills, diaphoresis, fatigue and fever  HENT: Negative  Eyes: Negative  Respiratory: Negative  Cardiovascular: Negative  Gastrointestinal: Negative  Negative for nausea and vomiting  Endocrine: Negative      Genitourinary: Negative for dysuria, frequency, hematuria, hesitancy and urgency  See HPI   Musculoskeletal: Negative  Skin: Negative  Allergic/Immunologic: Negative  Neurological: Negative  Hematological: Negative  Psychiatric/Behavioral: Negative  AUA SYMPTOM SCORE      Most Recent Value   AUA SYMPTOM SCORE   How often have you had a sensation of not emptying your bladder completely after you finished urinating? 4   How often have you had to urinate again less than two hours after you finished urinating? 5   How often have you found you stopped and started again several times when you urinate? 2   How often have you found it difficult to postpone urination? 3   How often have you had a weak urinary stream?  3   How often have you had to push or strain to begin urination? 0   How many times did you most typically get up to urinate from the time you went to bed at night until the time you got up in the morning? 1   Quality of Life: If you were to spend the rest of your life with your urinary condition just the way it is now, how would you feel about that?  3   AUA SYMPTOM SCORE  18        Objective:      /82 (BP Location: Left arm, Patient Position: Sitting, Cuff Size: Adult)   Pulse 64   Ht 5' 10" (1 778 m)   Wt 83 9 kg (185 lb)   BMI 26 54 kg/m²          Physical Exam   Constitutional: He is oriented to person, place, and time  He appears well-developed and well-nourished  HENT:   Head: Normocephalic and atraumatic  Eyes: Conjunctivae are normal    Neck: Neck supple  Cardiovascular: Normal rate  Pulmonary/Chest: Effort normal    Abdominal: Soft  Bowel sounds are normal  He exhibits no distension and no mass  There is no tenderness  There is no rebound, no guarding and no CVA tenderness  Genitourinary: Rectum normal, testes normal and penis normal  Right testis shows no mass  Left testis shows no mass  No phimosis or hypospadias     Genitourinary Comments: Prostate moderately enlarged and palpably benign   Musculoskeletal: He exhibits no edema  Neurological: He is alert and oriented to person, place, and time  Skin: Skin is warm and dry  Psychiatric: He has a normal mood and affect  His behavior is normal  Judgment and thought content normal    Vitals reviewed

## 2019-09-27 ENCOUNTER — OFFICE VISIT (OUTPATIENT)
Dept: UROLOGY | Facility: MEDICAL CENTER | Age: 84
End: 2019-09-27
Payer: COMMERCIAL

## 2019-09-27 VITALS
WEIGHT: 182 LBS | HEART RATE: 62 BPM | SYSTOLIC BLOOD PRESSURE: 128 MMHG | BODY MASS INDEX: 26.05 KG/M2 | HEIGHT: 70 IN | DIASTOLIC BLOOD PRESSURE: 62 MMHG

## 2019-09-27 DIAGNOSIS — N40.1 BPH WITH URINARY OBSTRUCTION: ICD-10-CM

## 2019-09-27 DIAGNOSIS — N13.8 BPH WITH URINARY OBSTRUCTION: ICD-10-CM

## 2019-09-27 PROCEDURE — 99214 OFFICE O/P EST MOD 30 MIN: CPT | Performed by: UROLOGY

## 2019-09-27 RX ORDER — RIBOFLAVIN (VITAMIN B2) 100 MG
100 TABLET ORAL DAILY
COMMUNITY
End: 2020-08-12

## 2019-09-27 RX ORDER — TAMSULOSIN HYDROCHLORIDE 0.4 MG/1
0.4 CAPSULE ORAL
Qty: 90 CAPSULE | Refills: 3 | Status: SHIPPED | OUTPATIENT
Start: 2019-09-27 | End: 2020-09-29 | Stop reason: SDUPTHER

## 2019-09-27 RX ORDER — FINASTERIDE 5 MG/1
5 TABLET, FILM COATED ORAL DAILY
Qty: 90 TABLET | Refills: 3 | Status: SHIPPED | OUTPATIENT
Start: 2019-09-27 | End: 2020-09-29 | Stop reason: SDUPTHER

## 2019-09-27 NOTE — ASSESSMENT & PLAN NOTE
AUA symptom score is 10 on combined therapy  He is mixed about his voiding pattern but he does not wish to consider any invasive or minimally invasive treatment at this time  His prescriptions were refilled  We will continue to follow him and he will return in 1 year

## 2019-09-27 NOTE — PATIENT INSTRUCTIONS
Benign Prostatic Hypertrophy   WHAT YOU NEED TO KNOW:   Benign prostatic hypertrophy (BPH) is a condition that causes your prostate gland to grow larger than normal  The prostate gland is the male sex gland that produces a fluid that is part of semen  It is about the size of a walnut and it is located under the bladder  As the prostate grows, it can squeeze the urethra  This can block urine flow and cause urinary problems  DISCHARGE INSTRUCTIONS:   Medicines:   · Alpha blockers: This medicine relaxes the muscles in your prostate and bladder  It may help you urinate more easily  · 5 alpha reductase inhibitors: These medicines block the production of a hormone that causes the prostate to get larger  It may help slow the growth of the prostate or shrink the prostate  · Take your medicine as directed  Contact your healthcare provider if you think your medicine is not helping or if you have side effects  Tell him or her if you are allergic to any medicine  Keep a list of the medicines, vitamins, and herbs you take  Include the amounts, and when and why you take them  Bring the list or the pill bottles to follow-up visits  Carry your medicine list with you in case of an emergency  Follow up with your healthcare provider as directed:  Write down your questions so you remember to ask them during your visits  Manage BPH:   · Do not let your bladder get too full before you empty it  Urinate when you feel the urge  · Limit alcohol  Do not drink large amounts of any liquid at one time  · Decrease the amount of salt you eat  Examples of salty foods are chips, cured meats, and canned soups  Do not use table salt  · Healthcare providers may tell you not to eat spicy foods such as chilli peppers  This may help you find out if spicy food makes your BPH symptoms worse  · You may have sex if you feel well  Contact your healthcare provider if:   · There is a large amount of blood in your urine  · Your signs and symptoms get worse  · You have a fever  · You have questions or concerns about your condition or care  Seek care immediately if:   · You are unable to urinate  · Your bladder feels very full and painful  © 2017 [employer] Information is for End User's use only and may not be sold, redistributed or otherwise used for commercial purposes  All illustrations and images included in CareNotes® are the copyrighted property of A D [employer] M [employer]  or [employer]  The above information is an  only  It is not intended as medical advice for individual conditions or treatments  Talk to your doctor, nurse or pharmacist before following any medical regimen to see if it is safe and effective for you

## 2019-09-27 NOTE — LETTER
September 27, 2019     Marisa Ramirez MD  [address]  [address] [address] [facility] [address]    Patient: Gabriela Santos   YOB: 1935   Date of Visit: 9/27/2019       Dear Dr Ismael Salinas: Thank you for referring Gabriela Santos to me for evaluation  Below are my notes for this consultation  If you have questions, please do not hesitate to call me  I look forward to following your patient along with you  Sincerely,        Mary Ellen Masters MD        CC: No Recipients  Mary Ellen Masters MD  9/27/2019 10:05 AM  Sign at close encounter  Assessment/Plan:    BPH with urinary obstruction  AUA symptom score is 10 on combined therapy  He is mixed about his voiding pattern but he does not wish to consider any invasive or minimally invasive treatment at this time  His prescriptions were refilled  We will continue to follow him and he will return in 1 year  Diagnoses and all orders for this visit:    BPH with urinary obstruction  -     tamsulosin (FLOMAX) 0 4 mg; Take 1 capsule (0 4 mg total) by mouth daily with dinner  -     finasteride (PROSCAR) 5 mg tablet; Take 1 tablet (5 mg total) by mouth daily    Other orders  -     Ascorbic Acid (VITAMIN C) 100 MG tablet; Take 100 mg by mouth daily          Subjective:      Patient ID: Gabriela Santos is a 82 y o  male  Benign Prostatic Hypertrophy   This is a chronic problem  The current episode started more than 1 year ago  The problem has been gradually improving since onset  Irritative symptoms include frequency  Irritative symptoms do not include nocturia or urgency  Nocturia 0-2  Obstructive symptoms include a slower stream  Obstructive symptoms do not include dribbling, incomplete emptying, an intermittent stream, straining or a weak stream  Pertinent negatives include no chills, dysuria, genital pain, hematuria, hesitancy, nausea or vomiting  AUA score is 8-19  His sexual activity is non-contributory to the current illness   The symptoms are aggravated by caffeine  Past treatments include finasteride and tamsulosin  The treatment provided moderate relief  The following portions of the patient's history were reviewed and updated as appropriate: allergies, current medications, past family history, past medical history, past social history, past surgical history and problem list     Review of Systems   Constitutional: Negative for chills, diaphoresis, fatigue and fever  HENT: Negative  Eyes: Negative  Respiratory: Positive for cough and shortness of breath  Cardiovascular: Negative  Gastrointestinal: Negative  Negative for nausea and vomiting  GERD sx   Endocrine: Negative  Genitourinary: Positive for frequency  Negative for dysuria, hematuria, hesitancy, incomplete emptying, nocturia and urgency  See HPI   Musculoskeletal: Negative  Skin: Negative  Allergic/Immunologic: Negative  Neurological: Negative  Hematological: Negative  Psychiatric/Behavioral: Negative  AUA SYMPTOM SCORE      Most Recent Value   AUA SYMPTOM SCORE   How often have you had a sensation of not emptying your bladder completely after you finished urinating? 2   How often have you had to urinate again less than two hours after you finished urinating? 4   How often have you found you stopped and started again several times when you urinate? 1   How often have you found it difficult to postpone urination? 1   How often have you had a weak urinary stream?  1   How often have you had to push or strain to begin urination? 0   How many times did you most typically get up to urinate from the time you went to bed at night until the time you got up in the morning?   1   Quality of Life: If you were to spend the rest of your life with your urinary condition just the way it is now, how would you feel about that?  3   AUA SYMPTOM SCORE  10        Objective:      /62 (BP Location: Left arm, Patient Position: Sitting, Cuff Size: Adult)   Pulse 62   Ht 5' 10" (1 778 m)   Wt 82 6 kg (182 lb)   BMI 26 11 kg/m²           Physical Exam   Constitutional: He is oriented to person, place, and time  He appears well-developed and well-nourished  HENT:   Head: Normocephalic and atraumatic  Eyes: Conjunctivae are normal    Neck: Neck supple  Cardiovascular: Normal rate  Pulmonary/Chest: Effort normal    Abdominal: He exhibits no distension and no mass  There is no tenderness  There is no rebound and no guarding  No hernia  No hernia   Genitourinary: Penis normal    Genitourinary Comments: Testes descended and normal to palpation   Neurological: He is alert and oriented to person, place, and time  Skin: Skin is warm and dry  Psychiatric: He has a normal mood and affect   His behavior is normal  Judgment and thought content normal

## 2019-09-27 NOTE — PROGRESS NOTES
Assessment/Plan:    BPH with urinary obstruction  AUA symptom score is 10 on combined therapy  He is mixed about his voiding pattern but he does not wish to consider any invasive or minimally invasive treatment at this time  His prescriptions were refilled  We will continue to follow him and he will return in 1 year  Diagnoses and all orders for this visit:    BPH with urinary obstruction  -     tamsulosin (FLOMAX) 0 4 mg; Take 1 capsule (0 4 mg total) by mouth daily with dinner  -     finasteride (PROSCAR) 5 mg tablet; Take 1 tablet (5 mg total) by mouth daily    Other orders  -     Ascorbic Acid (VITAMIN C) 100 MG tablet; Take 100 mg by mouth daily          Subjective:      Patient ID: Alia Espinoza is a 82 y o  male  Benign Prostatic Hypertrophy   This is a chronic problem  The current episode started more than 1 year ago  The problem has been gradually improving since onset  Irritative symptoms include frequency  Irritative symptoms do not include nocturia or urgency  Nocturia 0-2  Obstructive symptoms include a slower stream  Obstructive symptoms do not include dribbling, incomplete emptying, an intermittent stream, straining or a weak stream  Pertinent negatives include no chills, dysuria, genital pain, hematuria, hesitancy, nausea or vomiting  AUA score is 8-19  His sexual activity is non-contributory to the current illness  The symptoms are aggravated by caffeine  Past treatments include finasteride and tamsulosin  The treatment provided moderate relief  The following portions of the patient's history were reviewed and updated as appropriate: allergies, current medications, past family history, past medical history, past social history, past surgical history and problem list     Review of Systems   Constitutional: Negative for chills, diaphoresis, fatigue and fever  HENT: Negative  Eyes: Negative  Respiratory: Positive for cough and shortness of breath  Cardiovascular: Negative  Gastrointestinal: Negative  Negative for nausea and vomiting  GERD sx   Endocrine: Negative  Genitourinary: Positive for frequency  Negative for dysuria, hematuria, hesitancy, incomplete emptying, nocturia and urgency  See HPI   Musculoskeletal: Negative  Skin: Negative  Allergic/Immunologic: Negative  Neurological: Negative  Hematological: Negative  Psychiatric/Behavioral: Negative  AUA SYMPTOM SCORE      Most Recent Value   AUA SYMPTOM SCORE   How often have you had a sensation of not emptying your bladder completely after you finished urinating? 2   How often have you had to urinate again less than two hours after you finished urinating? 4   How often have you found you stopped and started again several times when you urinate? 1   How often have you found it difficult to postpone urination? 1   How often have you had a weak urinary stream?  1   How often have you had to push or strain to begin urination? 0   How many times did you most typically get up to urinate from the time you went to bed at night until the time you got up in the morning? 1   Quality of Life: If you were to spend the rest of your life with your urinary condition just the way it is now, how would you feel about that?  3   AUA SYMPTOM SCORE  10        Objective:      /62 (BP Location: Left arm, Patient Position: Sitting, Cuff Size: Adult)   Pulse 62   Ht 5' 10" (1 778 m)   Wt 82 6 kg (182 lb)   BMI 26 11 kg/m²          Physical Exam   Constitutional: He is oriented to person, place, and time  He appears well-developed and well-nourished  HENT:   Head: Normocephalic and atraumatic  Eyes: Conjunctivae are normal    Neck: Neck supple  Cardiovascular: Normal rate  Pulmonary/Chest: Effort normal    Abdominal: He exhibits no distension and no mass  There is no tenderness  There is no rebound and no guarding  No hernia     No hernia   Genitourinary: Penis normal    Genitourinary Comments: Testes descended and normal to palpation   Neurological: He is alert and oriented to person, place, and time  Skin: Skin is warm and dry  Psychiatric: He has a normal mood and affect   His behavior is normal  Judgment and thought content normal

## 2019-11-19 ENCOUNTER — ANESTHESIA EVENT (OUTPATIENT)
Dept: GASTROENTEROLOGY | Facility: HOSPITAL | Age: 84
End: 2019-11-19

## 2019-11-19 VITALS — BODY MASS INDEX: 25.54 KG/M2 | WEIGHT: 178 LBS

## 2019-11-19 RX ORDER — SODIUM CHLORIDE 9 MG/ML
50 INJECTION, SOLUTION INTRAVENOUS CONTINUOUS
Status: CANCELLED | OUTPATIENT
Start: 2019-11-19

## 2019-11-20 ENCOUNTER — ANESTHESIA (OUTPATIENT)
Dept: GASTROENTEROLOGY | Facility: HOSPITAL | Age: 84
End: 2019-11-20

## 2019-11-20 ENCOUNTER — HOSPITAL ENCOUNTER (OUTPATIENT)
Dept: GASTROENTEROLOGY | Facility: HOSPITAL | Age: 84
Setting detail: OUTPATIENT SURGERY
Discharge: HOME | End: 2019-11-20
Attending: INTERNAL MEDICINE

## 2019-12-05 NOTE — PRE-PROCEDURE INSTRUCTIONS
No outpatient medications have been marked as taking for the 12/9/19 encounter [facility] Encounter) with Estee Jin [phone] 
yes

## 2019-12-09 ENCOUNTER — HOSPITAL ENCOUNTER (OUTPATIENT)
Dept: GASTROENTEROLOGY | Facility: HOSPITAL | Age: 84
Setting detail: OUTPATIENT SURGERY
Discharge: HOME | End: 2019-12-09
Attending: INTERNAL MEDICINE | Admitting: INTERNAL MEDICINE
Payer: COMMERCIAL

## 2019-12-09 VITALS
RESPIRATION RATE: 18 BRPM | TEMPERATURE: 97.9 F | DIASTOLIC BLOOD PRESSURE: 81 MMHG | HEART RATE: 54 BPM | OXYGEN SATURATION: 99 % | SYSTOLIC BLOOD PRESSURE: 187 MMHG

## 2019-12-09 DIAGNOSIS — K21.00 GASTRO-ESOPHAGEAL REFLUX DISEASE WITH ESOPHAGITIS: ICD-10-CM

## 2019-12-09 DIAGNOSIS — R05.9 COUGH: ICD-10-CM

## 2019-12-09 DIAGNOSIS — K22.70 BARRETT'S ESOPHAGUS WITHOUT DYSPLASIA: ICD-10-CM

## 2019-12-09 PROCEDURE — 88342 IMHCHEM/IMCYTCHM 1ST ANTB: CPT | Performed by: PATHOLOGY

## 2019-12-09 PROCEDURE — 88360 TUMOR IMMUNOHISTOCHEM/MANUAL: CPT | Performed by: PATHOLOGY

## 2019-12-09 PROCEDURE — 88305 TISSUE EXAM BY PATHOLOGIST: CPT | Performed by: PATHOLOGY

## 2019-12-09 PROCEDURE — 88341 IMHCHEM/IMCYTCHM EA ADD ANTB: CPT | Performed by: PATHOLOGY

## 2019-12-09 RX ORDER — LIDOCAINE HYDROCHLORIDE 10 MG/ML
INJECTION, SOLUTION INFILTRATION; PERINEURAL AS NEEDED
Status: DISCONTINUED | OUTPATIENT
Start: 2019-12-09 | End: 2019-12-09 | Stop reason: SURG

## 2019-12-09 RX ORDER — SODIUM CHLORIDE 9 MG/ML
125 INJECTION, SOLUTION INTRAVENOUS CONTINUOUS
Status: DISCONTINUED | OUTPATIENT
Start: 2019-12-09 | End: 2019-12-13 | Stop reason: HOSPADM

## 2019-12-09 RX ORDER — SODIUM CHLORIDE 9 MG/ML
50 INJECTION, SOLUTION INTRAVENOUS CONTINUOUS
Status: DISCONTINUED | OUTPATIENT
Start: 2019-12-09 | End: 2019-12-13 | Stop reason: HOSPADM

## 2019-12-09 RX ORDER — PROPOFOL 10 MG/ML
INJECTION, EMULSION INTRAVENOUS AS NEEDED
Status: DISCONTINUED | OUTPATIENT
Start: 2019-12-09 | End: 2019-12-09 | Stop reason: SURG

## 2019-12-09 RX ORDER — SODIUM CHLORIDE 9 MG/ML
INJECTION, SOLUTION INTRAVENOUS CONTINUOUS PRN
Status: DISCONTINUED | OUTPATIENT
Start: 2019-12-09 | End: 2019-12-09 | Stop reason: SURG

## 2019-12-09 RX ADMIN — PROPOFOL 100 MG: 10 INJECTION, EMULSION INTRAVENOUS at 09:19

## 2019-12-09 RX ADMIN — SODIUM CHLORIDE: 0.9 INJECTION, SOLUTION INTRAVENOUS at 09:25

## 2019-12-09 RX ADMIN — LIDOCAINE HYDROCHLORIDE 50 MG: 10 INJECTION, SOLUTION INFILTRATION; PERINEURAL at 09:19

## 2019-12-09 RX ADMIN — SODIUM CHLORIDE: 0.9 INJECTION, SOLUTION INTRAVENOUS at 09:11

## 2019-12-09 NOTE — DISCHARGE INSTRUCTIONS
Please call [phone] with any problems  There appears to be a growth in the distal esophagus  My office should call you to arrange prompt follow-up next week    In the meantime make sure any food is cut finely etc

## 2019-12-09 NOTE — ANESTHESIA POSTPROCEDURE EVALUATION
Post-Op Assessment Note    CV Status:  Stable  Pain Score: 0    Pain management: adequate     Mental Status:  Alert and awake   Hydration Status:  Euvolemic   PONV Controlled:  Controlled   Airway Patency:  Patent   Post Op Vitals Reviewed: Yes      Staff: CRNA           BP     Temp     Pulse     Resp      SpO2

## 2019-12-09 NOTE — INTERVAL H&P NOTE
H&P reviewed  After examining the patient I find no changes in the patients condition since the H&P had been written      Vitals:    12/09/19 0745   BP: (!) 179/82   Pulse: 69   Resp: 16   Temp: (!) 97 2 °F (36 2 °C)   SpO2: 98%

## 2020-08-11 PROBLEM — I48.0 PAROXYSMAL ATRIAL FIBRILLATION (HCC): Status: ACTIVE | Noted: 2020-06-10

## 2020-08-11 PROBLEM — R63.4 WEIGHT LOSS: Status: ACTIVE | Noted: 2020-06-10

## 2020-08-11 PROBLEM — C15.9 MALIGNANT NEOPLASM OF ESOPHAGUS (HCC): Status: ACTIVE | Noted: 2020-01-17

## 2020-08-11 PROBLEM — C15.5 PRIMARY ADENOCARCINOMA OF DISTAL THIRD OF ESOPHAGUS (HCC): Status: ACTIVE | Noted: 2019-12-09

## 2020-08-11 RX ORDER — DILTIAZEM HYDROCHLORIDE 120 MG/1
120 CAPSULE, EXTENDED RELEASE ORAL DAILY
COMMUNITY
Start: 2020-05-25 | End: 2020-08-13

## 2020-08-11 RX ORDER — AMIODARONE HYDROCHLORIDE 200 MG/1
200 TABLET ORAL
COMMUNITY
Start: 2020-05-25 | End: 2020-08-12

## 2020-08-11 RX ORDER — SUCRALFATE ORAL 1 G/10ML
1 SUSPENSION ORAL 4 TIMES DAILY
COMMUNITY
Start: 2020-03-19 | End: 2020-08-12

## 2020-08-12 ENCOUNTER — OFFICE VISIT (OUTPATIENT)
Dept: FAMILY MEDICINE CLINIC | Facility: CLINIC | Age: 85
End: 2020-08-12
Payer: COMMERCIAL

## 2020-08-12 VITALS
HEIGHT: 70 IN | RESPIRATION RATE: 16 BRPM | TEMPERATURE: 98 F | HEART RATE: 60 BPM | SYSTOLIC BLOOD PRESSURE: 130 MMHG | DIASTOLIC BLOOD PRESSURE: 68 MMHG | BODY MASS INDEX: 19.13 KG/M2 | WEIGHT: 133.6 LBS

## 2020-08-12 DIAGNOSIS — K21.9 CHRONIC GERD: ICD-10-CM

## 2020-08-12 DIAGNOSIS — N13.8 BPH WITH URINARY OBSTRUCTION: ICD-10-CM

## 2020-08-12 DIAGNOSIS — I10 ESSENTIAL HYPERTENSION: ICD-10-CM

## 2020-08-12 DIAGNOSIS — C15.5 PRIMARY ADENOCARCINOMA OF DISTAL THIRD OF ESOPHAGUS (HCC): Primary | ICD-10-CM

## 2020-08-12 DIAGNOSIS — E11.9 TYPE 2 DIABETES MELLITUS WITHOUT COMPLICATION, WITHOUT LONG-TERM CURRENT USE OF INSULIN (HCC): ICD-10-CM

## 2020-08-12 DIAGNOSIS — D50.8 IRON DEFICIENCY ANEMIA SECONDARY TO INADEQUATE DIETARY IRON INTAKE: ICD-10-CM

## 2020-08-12 DIAGNOSIS — K90.9 VITAMIN B12 DEFICIENCY DUE TO INTESTINAL MALABSORPTION: ICD-10-CM

## 2020-08-12 DIAGNOSIS — R63.4 WEIGHT LOSS: ICD-10-CM

## 2020-08-12 DIAGNOSIS — E53.8 VITAMIN B12 DEFICIENCY DUE TO INTESTINAL MALABSORPTION: ICD-10-CM

## 2020-08-12 DIAGNOSIS — I48.0 PAROXYSMAL ATRIAL FIBRILLATION (HCC): ICD-10-CM

## 2020-08-12 DIAGNOSIS — N40.1 BPH WITH URINARY OBSTRUCTION: ICD-10-CM

## 2020-08-12 DIAGNOSIS — F17.200 TOBACCO USE DISORDER: ICD-10-CM

## 2020-08-12 PROCEDURE — 3044F HG A1C LEVEL LT 7.0%: CPT | Performed by: FAMILY MEDICINE

## 2020-08-12 PROCEDURE — 3725F SCREEN DEPRESSION PERFORMED: CPT | Performed by: FAMILY MEDICINE

## 2020-08-12 PROCEDURE — 4040F PNEUMOC VAC/ADMIN/RCVD: CPT | Performed by: FAMILY MEDICINE

## 2020-08-12 PROCEDURE — 1101F PT FALLS ASSESS-DOCD LE1/YR: CPT | Performed by: FAMILY MEDICINE

## 2020-08-12 PROCEDURE — 4010F ACE/ARB THERAPY RXD/TAKEN: CPT | Performed by: FAMILY MEDICINE

## 2020-08-12 PROCEDURE — 4004F PT TOBACCO SCREEN RCVD TLK: CPT | Performed by: FAMILY MEDICINE

## 2020-08-12 PROCEDURE — 1160F RVW MEDS BY RX/DR IN RCRD: CPT | Performed by: FAMILY MEDICINE

## 2020-08-12 PROCEDURE — 3288F FALL RISK ASSESSMENT DOCD: CPT | Performed by: FAMILY MEDICINE

## 2020-08-12 PROCEDURE — 3075F SYST BP GE 130 - 139MM HG: CPT | Performed by: FAMILY MEDICINE

## 2020-08-12 PROCEDURE — 3078F DIAST BP <80 MM HG: CPT | Performed by: FAMILY MEDICINE

## 2020-08-12 PROCEDURE — 99204 OFFICE O/P NEW MOD 45 MIN: CPT | Performed by: FAMILY MEDICINE

## 2020-08-12 RX ORDER — LOSARTAN POTASSIUM 50 MG/1
50 TABLET ORAL DAILY
Qty: 90 TABLET | Refills: 3 | Status: SHIPPED | OUTPATIENT
Start: 2020-08-12 | End: 2021-02-02

## 2020-08-12 NOTE — PROGRESS NOTES
Assessment and Plan: This is the 1st time I am meeting this pleasant 24-year-old white gentleman  He presents with his wife today who adds to his history  Patient has a history GERD, Castellanos's esophagus, hypertension, hyperlipidemia, diabetes and tobaccoism and has been followed at [address] [facility] for many years  Earlier this year, he had routine EGD for surveillance, but also had noted increased dysphagia and hiccups  EGD revealed friable esophagus and tumor which was in fact invasive adenocarcinoma the esophagus  He was evaluated at [address] where he received chemo and radiation, and subsequently had robotic surgery at [facility], 5/2020  The surgery went well  The postop period was complicated by paroxysmal atrial fibrillation  Patient was placed on amiodarone and has since followed up with his cardiologist in the [facility] [place], Dr Jesu Godinez  Since then, the amiodarone has been stopped  He was scheduled to have Zio patch placed for 2 weeks but declined this  He has lost almost 50 pounds since the onset  He has managed to keep his weight stable for the past few months, but is only tolerating a liquid diet  His wife is quite concerned about his nutritional status and weight  Since discharge, he notes he has not been on B12 supplements, Vit D supplements,iron supplements or his simvastatin  He is curious to know what his BW will reveal   He is currently on Omeprazole 40 mg BID    More recently, he has stopped smoking completely  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  His last EGD was performed by Dr Val Lemus  Current concerns today include:    1  Difficulty catching his breath at times, especially after eating, and shortness of breath on exertion  Both of these have improved dramatically in the past 3 months since surgery  2  Not being able to advance his diet; gags on bigger meals    3  One episode of dumping syndrome    4   BP was running high and losartan recently increased to 50mg daily by Cardiology  1  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 - status post thoracotom, esophagectomy with gastric pull-through  [facility]  Patient has lost 50 lb  He is maintaining his weight at around 1:33 a m  He is still on a liquid diet and has been unable to advance it due to gagging  Wife is concerned about his nutritional status  Blood work will be ordered today including B12, iron, vitamin D, vitamin-C  Patient will follow-up with oncology for routine surveillance  Of note, because of te gastric pull through, he may be experiencing some shortness of breath as his "stomach" is now probably in the chest cavity  This has been explained to patient previously and he is in agreement and does admit that symptoms are improving  Follow-up with me in 1 month to review  2  Paroxysmal Afib - this occurred in the postop period and appears to have subsequently subsided  Amiodarone was stopped  After the washout  , plan was to place him on a Zio patch, however he declined this  We discussed this at length today and the possible risks of clot formation with paroxysmal atrial fib  He may want to reconsider the Zio patch  He will contact his cardiologist to discuss this further  3  Hypertension - blood pressure has been acceptable with the increase in losartan  Continue 50 mg daily  4  Diabetes - for now, patient remains on metformin 500 mg b i d  I suspect, with his recent weight loss, he may not need this medication and we will be able to follow his sugars and A1c  Await blood work  5  BPH with obstruction - symptoms remain controlled on finasteride and tamsulosin  Follow-up with Dr Gentry Dennison  6  Chronic GERD - continue omeprazole 40 mg b i d     7  Tobaccoism - patient has stopped smoking  8  History of vitamin-D deficiency, B12 deficiency, hyperlipidemia, and iron deficiency    Will check blood work and patient to return to office in 1 month to review  8  Iron deficiency anemia - check iron, ferirtin, TIBC    9  Vitamin B12 deficiency due to malabsorption - off supplement right now  Check B12 and methylmalonic acid  10, Healthcare maintenance - patient is due for Medicare wellness visit, diabetic foot exam, iris, hemoglobin A1c  This will be done at his next visit in 1 month  45 minutes spent with patient today reviewing all of the above, with greater than 50% spent on counseling and care coordination  All questions were answered at length  Past medical, social, surgical history reviewed  Allergies reviewed  Current med list reviewed    Labs and follow-up in 1 month  Subjective:      Patient ID: Kristan Estrada is a 82 y o  male  CC:    Chief Complaint   Patient presents with   [facility]      pt here as a new pt to establish care  AUDELIA Oconnell       HPI:    This is the 1st time I am meeting this pleasant 33-year-old white gentleman  He presents with his wife today who adds to his history  Patient has a history GERD, Castellanos's esophagus, hypertension, hyperlipidemia, diabetes and tobaccoism and has been followed at [facility] primary care for many years  Earlier this year, he had routine EGD for surveillance, but also had noted increased dysphagia and hiccups  EGD revealed friable esophagus and tumor which was in fact invasive adenocarcinoma the esophagus  He was evaluated at [address] [place] [address] where he received chemo and radiation, and subsequently had robotic surgery at [facility], 5/2020  The surgery went well  The postop period was complicated by paroxysmal atrial fibrillation  Patient was placed on amiodarone and has since followed up with his cardiologist in the [facility], Dr Sarah Cha  Since then, the amiodarone has been stopped  He was scheduled to have Zio patch placed for 2 weeks but declined this        He has lost almost 50 pounds since the onset  He has managed to keep his weight stable for the past few months, but is only tolerating a liquid diet  His wife is quite concerned about his nutritional status and weight  Since discharge, he notes he has not been on B12 supplements, Vit D supplements,iron supplements or his simvastatin  He is curious to know what his BW will reveal   He is currently on Omeprazole 40 mg BID    More recently, he has stopped smoking completely  He sees Dr Genesis Sherman for BPH with obstruction  His last EGD was performed by Dr Toan Ty  Current concerns today include:    1  Difficulty catching his breath at times, especially after eating, and shortness of breath on exertion  Both of these have improved dramatically in the past 3 months since surgery  2  Not being able to advance his diet; gags on bigger meals    3  One episode of dumping syndrome    4  BP was running high and losartan recently increased to 50mg daily by Cardiology  5  He requests refills of losartan and metoprolol today  The following portions of the patient's history were reviewed and updated as appropriate: allergies, current medications, past family history, past medical history, past social history, past surgical history and problem list       Review of Systems   Constitutional:        See HPI   HENT: Negative for congestion, ear pain, mouth sores, sinus pressure and trouble swallowing  Eyes: Negative for discharge, redness and itching  Respiratory: Negative for apnea, cough, chest tightness, shortness of breath, wheezing and stridor  See HPI   Cardiovascular: Negative for chest pain, palpitations and leg swelling  Gastrointestinal: Negative for abdominal distention, abdominal pain, blood in stool, constipation, diarrhea, nausea and vomiting  See HPI   Endocrine: Negative for cold intolerance and heat intolerance          See HPI   Genitourinary: Negative for difficulty urinating, dysuria, flank pain and urgency  Musculoskeletal: Negative for arthralgias and myalgias  Skin: Negative for rash  Neurological: Negative for dizziness, seizures, syncope, speech difficulty, weakness, light-headedness, numbness and headaches  Hematological: Negative for adenopathy  Psychiatric/Behavioral: Negative for agitation, behavioral problems, confusion and sleep disturbance  The patient is not nervous/anxious  Mood is good  He is frustrated by not being able to eat what he wants to, but he has positive motivation and ambition and denies feelings of despair or hopelessness, SI or HI  Notes family is very supportive and close-knit  Data to review: Multiple hospital admission notes, consultation reports, labs, imaging  Objective:    Vitals:    08/12/20 1006   BP: 130/68   BP Location: Left arm   Patient Position: Sitting   Cuff Size: Large   Pulse: 60   Resp: 16   Temp: 98 °F (36 7 °C)   TempSrc: Temporal   Weight: 60 6 kg (133 lb 9 6 oz)   Height: 5' 10" (1 778 m)        Physical Exam  Constitutional:       General: He is not in acute distress  Appearance: He is well-developed  Comments: Thin, not cachectic  Ambulating well without aid  Well groomed  HENT:      Head: Normocephalic and atraumatic  Right Ear: External ear normal       Left Ear: External ear normal       Nose: Nose normal    Eyes:      General: No scleral icterus  Conjunctiva/sclera: Conjunctivae normal       Pupils: Pupils are equal, round, and reactive to light  Neck:      Musculoskeletal: Normal range of motion and neck supple  Cardiovascular:      Rate and Rhythm: Normal rate and regular rhythm  Heart sounds: Normal heart sounds  No murmur  No friction rub  No gallop  Pulmonary:      Effort: Pulmonary effort is normal  No respiratory distress  Breath sounds: Normal breath sounds  No wheezing or rales  Abdominal:      General: Abdomen is flat  Bowel sounds are normal  There is no distension  Palpations: Abdomen is soft  There is no mass  Tenderness: There is no abdominal tenderness  There is no guarding or rebound  Hernia: No hernia is present  Musculoskeletal: Normal range of motion  General: No tenderness  Lymphadenopathy:      Cervical: No cervical adenopathy  Skin:     General: Skin is warm and dry  Neurological:      Mental Status: He is alert and oriented to person, place, and time  Psychiatric:         Behavior: Behavior normal          Thought Content: Thought content normal          Judgment: Judgment normal              Tobacco Cessation Counseling: Tobacco cessation counseling was provided   The patient is sincerely urged to quit consumption of tobacco  He is not ready to quit tobacco

## 2020-08-13 ENCOUNTER — APPOINTMENT (OUTPATIENT)
Dept: LAB | Facility: MEDICAL CENTER | Age: 85
End: 2020-08-13
Payer: COMMERCIAL

## 2020-08-13 DIAGNOSIS — K21.9 CHRONIC GERD: Primary | ICD-10-CM

## 2020-08-13 DIAGNOSIS — E53.8 VITAMIN B12 DEFICIENCY DUE TO INTESTINAL MALABSORPTION: ICD-10-CM

## 2020-08-13 DIAGNOSIS — I48.0 PAROXYSMAL ATRIAL FIBRILLATION (HCC): ICD-10-CM

## 2020-08-13 DIAGNOSIS — I10 ESSENTIAL HYPERTENSION: ICD-10-CM

## 2020-08-13 DIAGNOSIS — N40.1 BPH WITH URINARY OBSTRUCTION: ICD-10-CM

## 2020-08-13 DIAGNOSIS — F17.200 TOBACCO USE DISORDER: ICD-10-CM

## 2020-08-13 DIAGNOSIS — C15.5 PRIMARY ADENOCARCINOMA OF DISTAL THIRD OF ESOPHAGUS (HCC): ICD-10-CM

## 2020-08-13 DIAGNOSIS — R63.4 WEIGHT LOSS: ICD-10-CM

## 2020-08-13 DIAGNOSIS — N13.8 BPH WITH URINARY OBSTRUCTION: ICD-10-CM

## 2020-08-13 DIAGNOSIS — K90.9 VITAMIN B12 DEFICIENCY DUE TO INTESTINAL MALABSORPTION: ICD-10-CM

## 2020-08-13 DIAGNOSIS — D50.8 IRON DEFICIENCY ANEMIA SECONDARY TO INADEQUATE DIETARY IRON INTAKE: ICD-10-CM

## 2020-08-13 DIAGNOSIS — K21.9 CHRONIC GERD: ICD-10-CM

## 2020-08-13 DIAGNOSIS — C15.5 MALIGNANT NEOPLASM OF LOWER THIRD OF ESOPHAGUS (HCC): ICD-10-CM

## 2020-08-13 DIAGNOSIS — E11.9 TYPE 2 DIABETES MELLITUS WITHOUT COMPLICATION, WITHOUT LONG-TERM CURRENT USE OF INSULIN (HCC): ICD-10-CM

## 2020-08-13 LAB
25(OH)D3 SERPL-MCNC: 40 NG/ML (ref 30–100)
ALBUMIN SERPL BCP-MCNC: 3.4 G/DL (ref 3.5–5)
ALP SERPL-CCNC: 64 U/L (ref 46–116)
ALT SERPL W P-5'-P-CCNC: 12 U/L (ref 12–78)
ANION GAP SERPL CALCULATED.3IONS-SCNC: 8 MMOL/L (ref 4–13)
AST SERPL W P-5'-P-CCNC: 10 U/L (ref 5–45)
BASOPHILS # BLD AUTO: 0.03 THOUSANDS/ΜL (ref 0–0.1)
BASOPHILS NFR BLD AUTO: 1 % (ref 0–1)
BILIRUB SERPL-MCNC: 0.5 MG/DL (ref 0.2–1)
BUN SERPL-MCNC: 16 MG/DL (ref 5–25)
CALCIUM SERPL-MCNC: 9.2 MG/DL (ref 8.3–10.1)
CHLORIDE SERPL-SCNC: 104 MMOL/L (ref 100–108)
CHOLEST SERPL-MCNC: 205 MG/DL (ref 50–200)
CO2 SERPL-SCNC: 29 MMOL/L (ref 21–32)
CREAT SERPL-MCNC: 0.93 MG/DL (ref 0.6–1.3)
EOSINOPHIL # BLD AUTO: 0.06 THOUSAND/ΜL (ref 0–0.61)
EOSINOPHIL NFR BLD AUTO: 2 % (ref 0–6)
ERYTHROCYTE [DISTWIDTH] IN BLOOD BY AUTOMATED COUNT: 15.8 % (ref 11.6–15.1)
EST. AVERAGE GLUCOSE BLD GHB EST-MCNC: 120 MG/DL
FERRITIN SERPL-MCNC: 26 NG/ML (ref 8–388)
GFR SERPL CREATININE-BSD FRML MDRD: 76 ML/MIN/1.73SQ M
GLUCOSE P FAST SERPL-MCNC: 101 MG/DL (ref 65–99)
HBA1C MFR BLD: 5.8 %
HCT VFR BLD AUTO: 38.1 % (ref 36.5–49.3)
HDLC SERPL-MCNC: 90 MG/DL
HGB BLD-MCNC: 12.2 G/DL (ref 12–17)
IMM GRANULOCYTES # BLD AUTO: 0.02 THOUSAND/UL (ref 0–0.2)
IMM GRANULOCYTES NFR BLD AUTO: 1 % (ref 0–2)
IRON SATN MFR SERPL: 12 %
IRON SERPL-MCNC: 33 UG/DL (ref 65–175)
LDLC SERPL CALC-MCNC: 95 MG/DL (ref 0–100)
LYMPHOCYTES # BLD AUTO: 0.58 THOUSANDS/ΜL (ref 0.6–4.47)
LYMPHOCYTES NFR BLD AUTO: 15 % (ref 14–44)
MCH RBC QN AUTO: 28.9 PG (ref 26.8–34.3)
MCHC RBC AUTO-ENTMCNC: 32 G/DL (ref 31.4–37.4)
MCV RBC AUTO: 90 FL (ref 82–98)
MONOCYTES # BLD AUTO: 0.64 THOUSAND/ΜL (ref 0.17–1.22)
MONOCYTES NFR BLD AUTO: 16 % (ref 4–12)
NEUTROPHILS # BLD AUTO: 2.66 THOUSANDS/ΜL (ref 1.85–7.62)
NEUTS SEG NFR BLD AUTO: 65 % (ref 43–75)
NONHDLC SERPL-MCNC: 115 MG/DL
NRBC BLD AUTO-RTO: 0 /100 WBCS
PLATELET # BLD AUTO: 202 THOUSANDS/UL (ref 149–390)
PMV BLD AUTO: 11.3 FL (ref 8.9–12.7)
POTASSIUM SERPL-SCNC: 3.9 MMOL/L (ref 3.5–5.3)
PROT SERPL-MCNC: 6.6 G/DL (ref 6.4–8.2)
PSA SERPL-MCNC: 0.2 NG/ML (ref 0–4)
RBC # BLD AUTO: 4.22 MILLION/UL (ref 3.88–5.62)
SODIUM SERPL-SCNC: 141 MMOL/L (ref 136–145)
T4 SERPL-MCNC: 8.7 UG/DL (ref 4.7–13.3)
TIBC SERPL-MCNC: 269 UG/DL (ref 250–450)
TRIGL SERPL-MCNC: 102 MG/DL
TSH SERPL DL<=0.05 MIU/L-ACNC: 3.85 UIU/ML (ref 0.36–3.74)
VIT B12 SERPL-MCNC: 811 PG/ML (ref 100–900)
WBC # BLD AUTO: 3.99 THOUSAND/UL (ref 4.31–10.16)

## 2020-08-13 PROCEDURE — 84443 ASSAY THYROID STIM HORMONE: CPT

## 2020-08-13 PROCEDURE — 83918 ORGANIC ACIDS TOTAL QUANT: CPT

## 2020-08-13 PROCEDURE — 82306 VITAMIN D 25 HYDROXY: CPT

## 2020-08-13 PROCEDURE — 80061 LIPID PANEL: CPT

## 2020-08-13 PROCEDURE — 82180 ASSAY OF ASCORBIC ACID: CPT

## 2020-08-13 PROCEDURE — 82728 ASSAY OF FERRITIN: CPT

## 2020-08-13 PROCEDURE — 83540 ASSAY OF IRON: CPT

## 2020-08-13 PROCEDURE — 85025 COMPLETE CBC W/AUTO DIFF WBC: CPT

## 2020-08-13 PROCEDURE — G0103 PSA SCREENING: HCPCS

## 2020-08-13 PROCEDURE — 83550 IRON BINDING TEST: CPT

## 2020-08-13 PROCEDURE — 80053 COMPREHEN METABOLIC PANEL: CPT

## 2020-08-13 PROCEDURE — 84436 ASSAY OF TOTAL THYROXINE: CPT

## 2020-08-13 PROCEDURE — 83036 HEMOGLOBIN GLYCOSYLATED A1C: CPT

## 2020-08-13 PROCEDURE — 36415 COLL VENOUS BLD VENIPUNCTURE: CPT

## 2020-08-13 PROCEDURE — 82607 VITAMIN B-12: CPT

## 2020-08-13 RX ORDER — OMEPRAZOLE 40 MG/1
CAPSULE, DELAYED RELEASE ORAL
Qty: 180 CAPSULE | Refills: 2
Start: 2020-08-13 | End: 2020-11-27 | Stop reason: SDUPTHER

## 2020-08-17 LAB
METHYLMALONATE SERPL-SCNC: 344 NMOL/L (ref 0–378)
SL AMB DISCLAIMER: NORMAL

## 2020-08-18 LAB — VIT C SERPL-MCNC: 0.1 MG/DL (ref 0.4–2)

## 2020-08-25 ENCOUNTER — TELEPHONE (OUTPATIENT)
Dept: FAMILY MEDICINE CLINIC | Facility: CLINIC | Age: 85
End: 2020-08-25

## 2020-08-25 NOTE — TELEPHONE ENCOUNTER
Called patient and spoke with his wife  Reviewed blood work results  Patient to remain on metformin for now  He may hold off on his statin which he has not taken for months  He has not been taking any iron supplements for a while and will hold off until his next appointment when we will we discussed  Vitamin B12 is normal   TSH is borderline and will continue to monitor that  Wife is still concerned about his caloric intake  His weight has remained stable  He is still on a liquid diet  We discussed supplementation with vitamin-C  I will see him back in September  All questions answered

## 2020-09-15 PROBLEM — R79.89 ELEVATED TSH: Status: ACTIVE | Noted: 2020-09-15

## 2020-09-16 ENCOUNTER — OFFICE VISIT (OUTPATIENT)
Dept: FAMILY MEDICINE CLINIC | Facility: CLINIC | Age: 85
End: 2020-09-16
Payer: COMMERCIAL

## 2020-09-16 VITALS
HEART RATE: 60 BPM | RESPIRATION RATE: 16 BRPM | WEIGHT: 130 LBS | HEIGHT: 70 IN | TEMPERATURE: 97 F | SYSTOLIC BLOOD PRESSURE: 112 MMHG | DIASTOLIC BLOOD PRESSURE: 68 MMHG | BODY MASS INDEX: 18.61 KG/M2

## 2020-09-16 DIAGNOSIS — R00.0 TACHYARRHYTHMIA: ICD-10-CM

## 2020-09-16 DIAGNOSIS — R79.89 ELEVATED TSH: ICD-10-CM

## 2020-09-16 DIAGNOSIS — N13.8 BPH WITH URINARY OBSTRUCTION: ICD-10-CM

## 2020-09-16 DIAGNOSIS — C15.5 PRIMARY ADENOCARCINOMA OF DISTAL THIRD OF ESOPHAGUS (HCC): Primary | ICD-10-CM

## 2020-09-16 DIAGNOSIS — N40.1 BPH WITH URINARY OBSTRUCTION: ICD-10-CM

## 2020-09-16 DIAGNOSIS — Z00.00 MEDICARE ANNUAL WELLNESS VISIT, SUBSEQUENT: ICD-10-CM

## 2020-09-16 DIAGNOSIS — E11.9 TYPE 2 DIABETES MELLITUS WITHOUT COMPLICATION, WITHOUT LONG-TERM CURRENT USE OF INSULIN (HCC): ICD-10-CM

## 2020-09-16 DIAGNOSIS — K21.9 CHRONIC GERD: ICD-10-CM

## 2020-09-16 DIAGNOSIS — R63.4 WEIGHT LOSS: ICD-10-CM

## 2020-09-16 DIAGNOSIS — I48.0 PAROXYSMAL ATRIAL FIBRILLATION (HCC): ICD-10-CM

## 2020-09-16 DIAGNOSIS — E61.1 IRON DEFICIENCY: ICD-10-CM

## 2020-09-16 DIAGNOSIS — E55.9 VITAMIN D DEFICIENCY: ICD-10-CM

## 2020-09-16 PROCEDURE — 1125F AMNT PAIN NOTED PAIN PRSNT: CPT | Performed by: FAMILY MEDICINE

## 2020-09-16 PROCEDURE — 1170F FXNL STATUS ASSESSED: CPT | Performed by: FAMILY MEDICINE

## 2020-09-16 PROCEDURE — 99215 OFFICE O/P EST HI 40 MIN: CPT | Performed by: FAMILY MEDICINE

## 2020-09-16 PROCEDURE — G0439 PPPS, SUBSEQ VISIT: HCPCS | Performed by: FAMILY MEDICINE

## 2020-09-16 PROCEDURE — 3078F DIAST BP <80 MM HG: CPT | Performed by: FAMILY MEDICINE

## 2020-09-16 NOTE — PROGRESS NOTES
Assessment and Plan:     53-year-old white male presents today for annual Medicare wellness questionnaire  He is status post esophageal cancer with surgical intervention in May of this year  1  Immunizations reviewed  Discussed Shingrix vaccine  He would like to hold off for now  2  Diabetic metrics reviewed  3  Living will and advanced directives are complete  4  For full evaluation of chronic medical conditions, please see today's office note  Problem List Items Addressed This Visit        Digestive    Chronic GERD    Primary adenocarcinoma of distal third of esophagus ([facility] ) - Primary       Endocrine    DM2 (diabetes mellitus, type 2) ([facility] )       Cardiovascular and Mediastinum    Paroxysmal atrial fibrillation (HCC)    Relevant Medications    metoprolol tartrate (LOPRESSOR) 25 mg tablet       Genitourinary    BPH with urinary obstruction       Other    Elevated TSH      Other Visit Diagnoses     Medicare annual wellness visit, subsequent               Preventive health issues were discussed with patient, and age appropriate screening tests were ordered as noted in patient's After Visit Summary  Personalized health advice and appropriate referrals for health education or preventive services given if needed, as noted in patient's After Visit Summary       History of Present Illness:     Patient presents for Medicare Annual Wellness visit    Patient Care Team:  Jonathan Schaefer MD as PCP - General (Family Medicine)  Anastacio Muir MD     Problem List:     Patient Active Problem List   Diagnosis    Incomplete bladder emptying    Chronic GERD    DM2 (diabetes mellitus, type 2) ([place] [address] )    BPH with urinary obstruction    Erectile dysfunction    Essential hypertension    Hyperlipidemia    Obstructive sleep apnea    Tobacco use disorder    Frequency of urination    Paroxysmal atrial fibrillation (HCC)    Lumbar radiculopathy, chronic    Iron deficiency anemia secondary to inadequate dietary iron intake  Primary adenocarcinoma of distal third of esophagus ([facility] )    Weight loss    Elevated TSH      Past Medical and Surgical History:     Past Medical History:   Diagnosis Date    Anemia     Castellanos's esophagus without dysplasia 2006    BPH with obstruction/lower urinary tract symptoms     Chronic pain disorder     lumbo sacral, pt had surg and pain is improved    Colon polyp 2006    Cough     Diabetes mellitus (HCC)     Diverticulosis     Elevated PSA     Erectile dysfunction     Frequency of micturition     GERD (gastroesophageal reflux disease)     Hiatal hernia     History of Helicobacter pylori infection 2006    Hyperlipidemia     Hypertension      Past Surgical History:   Procedure Laterality Date    APPENDECTOMY      BACK SURGERY      CATARACT EXTRACTION Bilateral     COLONOSCOPY      LUMBAR LAMINECTOMY      MASTOID SURGERY      NASAL SEPTUM SURGERY      VASECTOMY        Family History:     Family History   Problem Relation Age of Onset    Kidney disease Father     No Known Problems Mother       Social History:        Social History     Socioeconomic History    Marital status: /Civil Union     Spouse name: Not on file    Number of children: Not on file    Years of education: Not on file    Highest education level: Not on file   Occupational History    Not on file   Social Needs    Financial resource strain: Not on file    Food insecurity     Worry: Not on file     Inability: Not on file    Transportation needs     Medical: Not on file     Non-medical: Not on file   Tobacco Use    Smoking status: Former Smoker    Smokeless tobacco: Never Used    Tobacco comment: 7 per day   Substance and Sexual Activity    Alcohol use:  Yes     Alcohol/week: 1 0 standard drinks     Types: 1 Glasses of wine per week     Comment: 1 glass of wine per week    Drug use: No    Sexual activity: Not on file   Lifestyle    Physical activity     Days per week: Not on file     Minutes per session: Not on file    Stress: Not on file   Relationships    Social connections     Talks on phone: Not on file     Gets together: Not on file     Attends Holiness service: Not on file     Active member of club or organization: Not on file     Attends meetings of clubs or organizations: Not on file     Relationship status: Not on file    Intimate partner violence     Fear of current or ex partner: Not on file     Emotionally abused: Not on file     Physically abused: Not on file     Forced sexual activity: Not on file   Other Topics Concern    Not on file   Social History Narrative    Not on file      Medications and Allergies:     Current Outpatient Medications   Medication Sig Dispense Refill    metoprolol tartrate (LOPRESSOR) 25 mg tablet TAKE ONE-HALF TABLET (12 5 MG), TWICE A DAY      finasteride (PROSCAR) 5 mg tablet Take 1 tablet (5 mg total) by mouth daily 90 tablet 3    losartan (COZAAR) 50 mg tablet Take 1 tablet (50 mg total) by mouth daily 90 tablet 3    metFORMIN (GLUCOPHAGE) 500 mg tablet Take 1 tablet (500 mg total) by mouth 2 (two) times a day with meals 180 tablet 3    omeprazole (PriLOSEC) 40 MG capsule 1 tab po  capsule 2    tamsulosin (FLOMAX) 0 4 mg Take 1 capsule (0 4 mg total) by mouth daily with dinner 90 capsule 3     No current facility-administered medications for this visit  Allergies   Allergen Reactions    Other       Immunizations:     Immunization History   Administered Date(s) Administered    INFLUENZA 12/27/2011, 09/23/2013, 10/29/2014, 10/01/2015, 11/17/2016, 10/17/2017, 10/30/2018, 10/21/2019    Influenza Split High Dose Preservative Free IM 10/01/2015, 11/17/2016, 10/17/2017, 10/30/2018    Pneumococcal Conjugate 13-Valent 04/07/2016    Pneumococcal Polysaccharide PPV23 12/22/2010    Tdap 04/07/2011      Health Maintenance: There are no preventive care reminders to display for this patient        Topic Date Due    Influenza Vaccine  07/01/2020 Medicare Health Risk Assessment:     There were no vitals taken for this visit  Health Risk Assessment:   Patient rates overall health as fair  Patient feels that their physical health rating is same  Eyesight was rated as slightly worse  Hearing was rated as same  Patient feels that their emotional and mental health rating is same  Pain experienced in the last 7 days has been none  Patient states that he has experienced no weight loss or gain in last 6 months  Explained weight loss due to esophageal cancer  Fall Risk Screening: In the past year, patient has experienced: no history of falling in past year      Home Safety:  Patient has trouble with stairs inside or outside of their home  Patient has working smoke alarms and has working carbon monoxide detector  Home safety hazards include: none  Nutrition:   Current diet is Regular  Medications:   Patient is currently taking over-the-counter supplements  OTC medications include: see medication list  Patient is able to manage medications  Activities of Daily Living (ADLs)/Instrumental Activities of Daily Living (IADLs):   Walk and transfer into and out of bed and chair?: Yes  Dress and groom yourself?: Yes    Bathe or shower yourself?: Yes    Feed yourself? Yes  Do your laundry/housekeeping?: No  Manage your money, pay your bills and track your expenses?: No  Make your own meals?: No    Do your own shopping?: No    ADL comments: Pt is slowly gaining back strength s/p esophageal cancer and weight loss of 30-40 pounds  Previous Hospitalizations:   Any hospitalizations or ED visits within the last 12 months?: Yes    How many hospitalizations have you had in the last year?: 1-2    Hospitalization Comments: Esophageal cancer, May 2020  Advance Care Planning:   Living will: Yes    Durable POA for healthcare:  Yes    Advanced directive: Yes    Advanced directive counseling given: Yes      Cognitive Screening:   Provider or family/friend/caregiver concerned regarding cognition?: No    PREVENTIVE SCREENINGS      Cardiovascular Screening:    General: History Lipid Disorder and Screening Current      Diabetes Screening:     General: History Diabetes and Screening Current      Colorectal Cancer Screening:     General: Screening Current      Prostate Cancer Screening:    General: Screening Current      Abdominal Aortic Aneurysm (AAA) Screening:    Risk factors include: tobacco use        Lung Cancer Screening:     General: Screening Not Indicated      Hepatitis C Screening:    General: Screening Not Indicated      Gisel Montgomery MD

## 2020-09-16 NOTE — PROGRESS NOTES
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 - patient will be following up [facility], operated by [facility]  He will also follow-up with surveillance EGD with Dr King Koo  2  Diabetes mellitus - hemoglobin A1cs company  For now, he remains on metformin 500 mg b i d  Due to his weight loss, he has diabetes has improved dramatically  He may decrease metformin to 500 mg daily and continue to watch his sugars  Continue losartan for renal prophylaxis  Patient's recent lipids are normal   However, given his history of diabetes, he should restart his simvastatin at 10 mg daily or several times a week, as tolerated  He is not anxious about starting another medication, but agrees to restart his statin, as per diabetic guidelines  3  Paroxysmal Afib - recent Holter monitor revealed episodes of tachyarrhythmia but no atrial fibrillation  Patient remains on metoprolol, as per Cardiology  His pulse is 60 and he does not have exercise intolerance as this time  4  GERD - patient remains on omeprazole 40 mg daily  5  BPH with obstruction - patient remains on finasteride and tamsulosin  His PSA is normal at 0 2     6  Elevated TSH - borderline at 3 8  Continue to monitor  He does not have symptoms of hypothyroidism  7  Weight loss - secondary to surgery and esophageal cancer as above  His weight has remained stable since his surgery  Long discussion with patient and his wife regarding food choices and eating small frequent meals instead of larger ones  Patient should continue to eat the foods that he tolerates  I emphasized that it is unlikely that he will reach his pre-surgery weight, and that his body is adjusting to this new weight and to his caloric intake  Continue to monitor  8  Tachyarrythmia - has above, he is now on metoprolol and will follow-up with cardiology  9  Vitamin-D deficiency - vitamin-D is well supplemented  Continue vitamin D3 supplementation      10  Iron deficiency - iron stores are low, likely due to chronic blood loss from #1  Ferritin is low normal   He found iron supplements to be very constipating  Continue to monitor for now  His hemoglobin and hematocrit are within normal limits  Follow-up with me in 3 months  Subjective:      Patient ID: Lela Gifford is a 82 y o  male  CC:    Chief Complaint   Patient presents with    Follow-up     pt here for a follow up and to review lab results  R Cruz  Medicare Wellness Visit       HPI:    Mr Altaf Newman is a pleasant 14-year-old white male who presents today with his wife to review his chronic medical conditions, and most recently, his diagnosis and surgery for esophageal cancer  His wife adds that she is very concerned about his weight  His weight has remained stable but he has not gained any  She is very worried about his nutritional status and any vitamin or mineral deficiencies  She is trying to make all different kinds of foods with different consistencies  There are some that he tolerates an others that he does not  When questioned, patient states he is feeling pretty good  His exercise tolerance has increased and he can now walk 1 full lap of [place] without stopping due to shortness of breath  He recognizes that he cannot eat big amounts and is often uncomfortable after meals  He has had several episodes of "dumping" where he has diarrhea after meals  Occasionally his food will come up or he will gag with meals  This is not new and is not occurring more frequently  His mood is good and his ambition is good  He denies feeling depressed or having thoughts of death or dying  Did have heart monitor revealing 58 incidents of arrythmia; asymptomatic; tachyarrythmia  Apparently not atrial fibrillation    Now on metoprolol 1/2 BID, as per cardiology  Seeing Dr Jesus Henry Sept 22    For continued surveillance of recent esophageal cancer, patient [facility]  Endoscopy follow-up scheduled with Dr Val Lemus    He has BW to review  He has not taken a statin in several months since his weight loss and surgery in May  The following portions of the patient's history were reviewed and updated as appropriate: allergies, current medications, past family history, past medical history, past social history, past surgical history and problem list       Review of Systems   Constitutional: Negative for unexpected weight change  See HPI   HENT: Negative for congestion, ear pain, mouth sores, sinus pressure and trouble swallowing  Eyes: Negative for discharge, redness and itching  Respiratory: Negative for apnea, cough, chest tightness, shortness of breath, wheezing and stridor  See HPI   Cardiovascular: Negative for chest pain, palpitations and leg swelling  Gastrointestinal: Negative for abdominal distention, abdominal pain, blood in stool, constipation, diarrhea, nausea and vomiting  See HPI   Endocrine: Negative for cold intolerance and heat intolerance  Sugars have been good and <140   Genitourinary: Negative for difficulty urinating, dysuria, flank pain and urgency  Musculoskeletal: Negative for arthralgias and myalgias  Skin: Negative for rash  Neurological: Negative for dizziness, seizures, syncope, speech difficulty, weakness, light-headedness, numbness and headaches  Hematological: Negative for adenopathy  Psychiatric/Behavioral: Negative for agitation, behavioral problems, confusion and sleep disturbance  The patient is not nervous/anxious           See HPI         Data to review:       Objective:    Vitals:    09/16/20 0923   BP: 112/68   BP Location: Left arm   Patient Position: Sitting   Cuff Size: Standard   Pulse: 60   Resp: 16   Temp: (!) 97 °F (36 1 °C)   TempSrc: Temporal   Weight: 59 kg (130 lb)   Height: 5' 10" (1 778 m)        Physical Exam  Constitutional:       General: He is not in acute distress  Appearance: Normal appearance  He is well-developed  Comments: Thin  NOt cachectic  HENT:      Head: Normocephalic and atraumatic  Right Ear: External ear normal       Left Ear: External ear normal    Eyes:      General: No scleral icterus  Conjunctiva/sclera: Conjunctivae normal       Pupils: Pupils are equal, round, and reactive to light  Neck:      Musculoskeletal: Normal range of motion and neck supple  Cardiovascular:      Rate and Rhythm: Normal rate and regular rhythm  Heart sounds: Normal heart sounds  No murmur  No friction rub  No gallop  Pulmonary:      Effort: Pulmonary effort is normal  No respiratory distress  Breath sounds: Normal breath sounds  No wheezing or rales  Abdominal:      General: Bowel sounds are normal  There is no distension  Palpations: Abdomen is soft  Tenderness: There is no abdominal tenderness  There is no guarding  Hernia: No hernia is present  Musculoskeletal: Normal range of motion  General: No tenderness  Lymphadenopathy:      Cervical: No cervical adenopathy  Skin:     General: Skin is warm and dry  Neurological:      Mental Status: He is alert and oriented to person, place, and time  Psychiatric:         Behavior: Behavior normal          Thought Content:  Thought content normal          Judgment: Judgment normal

## 2020-09-17 ENCOUNTER — TELEPHONE (OUTPATIENT)
Dept: FAMILY MEDICINE CLINIC | Facility: CLINIC | Age: 85
End: 2020-09-17

## 2020-09-17 DIAGNOSIS — E78.5 HYPERLIPIDEMIA, UNSPECIFIED HYPERLIPIDEMIA TYPE: Primary | ICD-10-CM

## 2020-09-17 NOTE — TELEPHONE ENCOUNTER
Pt's wife called back to let us know that pt was taking Simvastatin 10 mg 1 tablet only twice per week  I updated Med list  Please sign off on changes I made

## 2020-09-18 RX ORDER — SIMVASTATIN 10 MG
TABLET ORAL
Qty: 30 TABLET | Refills: 5
Start: 2020-09-18 | End: 2020-10-07 | Stop reason: SDUPTHER

## 2020-09-29 ENCOUNTER — OFFICE VISIT (OUTPATIENT)
Dept: UROLOGY | Facility: MEDICAL CENTER | Age: 85
End: 2020-09-29
Payer: COMMERCIAL

## 2020-09-29 VITALS
WEIGHT: 130 LBS | HEIGHT: 70 IN | DIASTOLIC BLOOD PRESSURE: 72 MMHG | BODY MASS INDEX: 18.61 KG/M2 | TEMPERATURE: 97 F | SYSTOLIC BLOOD PRESSURE: 110 MMHG

## 2020-09-29 DIAGNOSIS — N40.1 BPH WITH URINARY OBSTRUCTION: ICD-10-CM

## 2020-09-29 DIAGNOSIS — N13.8 BPH WITH URINARY OBSTRUCTION: ICD-10-CM

## 2020-09-29 PROCEDURE — 1160F RVW MEDS BY RX/DR IN RCRD: CPT | Performed by: UROLOGY

## 2020-09-29 PROCEDURE — 1036F TOBACCO NON-USER: CPT | Performed by: UROLOGY

## 2020-09-29 PROCEDURE — 99214 OFFICE O/P EST MOD 30 MIN: CPT | Performed by: UROLOGY

## 2020-09-29 RX ORDER — FINASTERIDE 5 MG/1
5 TABLET, FILM COATED ORAL DAILY
Qty: 90 TABLET | Refills: 3 | Status: SHIPPED | OUTPATIENT
Start: 2020-09-29 | End: 2021-05-19 | Stop reason: ALTCHOICE

## 2020-09-29 RX ORDER — TAMSULOSIN HYDROCHLORIDE 0.4 MG/1
0.4 CAPSULE ORAL
Qty: 90 CAPSULE | Refills: 3 | Status: SHIPPED | OUTPATIENT
Start: 2020-09-29 | End: 2021-10-20

## 2020-09-29 NOTE — ASSESSMENT & PLAN NOTE
AUA symptom score is 9  He is pleased with his voiding pattern on combined medical therapy  Options were discussed  We will continue his present therapy  Digital rectal examination was benign  His prescriptions were refilled  He will return in 1 year

## 2020-09-29 NOTE — PROGRESS NOTES
Assessment/Plan:    BPH with urinary obstruction  AUA symptom score is 9  He is pleased with his voiding pattern on combined medical therapy  Options were discussed  We will continue his present therapy  Digital rectal examination was benign  His prescriptions were refilled  He will return in 1 year  Diagnoses and all orders for this visit:    BPH with urinary obstruction  -     tamsulosin (FLOMAX) 0 4 mg; Take 1 capsule (0 4 mg total) by mouth daily with dinner  -     finasteride (PROSCAR) 5 mg tablet; Take 1 tablet (5 mg total) by mouth daily          Subjective:      Patient ID: Abhijit Britt is a 82 y o  male  Benign Prostatic Hypertrophy   This is a chronic problem  The current episode started more than 1 year ago  The problem is unchanged  Irritative symptoms do not include frequency or urgency  nocturia 1-0, occasional urgency  Obstructive symptoms include a slower stream  Obstructive symptoms do not include an intermittent stream, straining or a weak stream  Pertinent negatives include no chills, dysuria, genital pain, hematuria, hesitancy, nausea or vomiting  AUA score is 8-19  His sexual activity is non-contributory to the current illness  The symptoms are aggravated by caffeine  Past treatments include finasteride and tamsulosin  The treatment provided mild relief  The patient was recently treated for esophageal ancer with chemotherapy, radiation therapy and esophagogastrectomy  He is doing well at this time and continues to improving gain weight  The following portions of the patient's history were reviewed and updated as appropriate: allergies, current medications, past family history, past medical history, past social history, past surgical history and problem list     Review of Systems   Constitutional: Negative  Negative for activity change, appetite change, chills, diaphoresis, fatigue, fever and unexpected weight change  HENT: Negative  Eyes: Negative      Respiratory: Negative  Cardiovascular: Negative  Gastrointestinal: Positive for diarrhea  Negative for nausea and vomiting  Endocrine: Negative  Genitourinary: Negative for dysuria, frequency, hematuria, hesitancy and urgency  See HPI   Musculoskeletal: Negative  Skin: Negative  Allergic/Immunologic: Negative  Neurological: Negative  Hematological: Negative  Psychiatric/Behavioral: Negative  AUA SYMPTOM SCORE      Most Recent Value   AUA SYMPTOM SCORE   How often have you had a sensation of not emptying your bladder completely after you finished urinating? 2   How often have you had to urinate again less than two hours after you finished urinating? 2   How often have you found you stopped and started again several times when you urinate? 1   How often have you found it difficult to postpone urination? 2   How often have you had a weak urinary stream?  1   How often have you had to push or strain to begin urination? 0   How many times did you most typically get up to urinate from the time you went to bed at night until the time you got up in the morning? 1   Quality of Life: If you were to spend the rest of your life with your urinary condition just the way it is now, how would you feel about that?  1   AUA SYMPTOM SCORE  9          Objective:      /72   Temp (!) 97 °F (36 1 °C)   Ht 5' 10" (1 778 m)   Wt 59 kg (130 lb)   BMI 18 65 kg/m²          Physical Exam  Vitals signs reviewed  Constitutional:       General: He is not in acute distress  Appearance: Normal appearance  He is well-developed  He is not ill-appearing, toxic-appearing or diaphoretic  Comments: Thin   HENT:      Head: Normocephalic and atraumatic  Eyes:      General: No scleral icterus  Conjunctiva/sclera: Conjunctivae normal    Neck:      Musculoskeletal: Neck supple  Cardiovascular:      Rate and Rhythm: Normal rate     Pulmonary:      Effort: Pulmonary effort is normal    Abdominal: General: Bowel sounds are normal  There is no distension  Palpations: Abdomen is soft  There is no mass  Tenderness: There is no abdominal tenderness  There is no right CVA tenderness, left CVA tenderness, guarding or rebound  Hernia: No hernia is present  Genitourinary:     Penis: Normal  No phimosis or hypospadias  Scrotum/Testes: Normal          Right: Mass not present  Left: Mass not present  Rectum: Normal       Comments: Prostate moderately enlarged and palpably benign  Skin:     General: Skin is warm and dry  Neurological:      Mental Status: He is alert and oriented to person, place, and time  Psychiatric:         Mood and Affect: Mood normal          Behavior: Behavior normal          Thought Content:  Thought content normal          Judgment: Judgment normal

## 2020-10-07 DIAGNOSIS — E78.5 HYPERLIPIDEMIA, UNSPECIFIED HYPERLIPIDEMIA TYPE: ICD-10-CM

## 2020-10-08 RX ORDER — SIMVASTATIN 10 MG
TABLET ORAL
Qty: 90 TABLET | Refills: 1 | Status: SHIPPED | OUTPATIENT
Start: 2020-10-08 | End: 2020-11-30

## 2020-11-17 ENCOUNTER — TELEPHONE (OUTPATIENT)
Dept: FAMILY MEDICINE CLINIC | Facility: CLINIC | Age: 85
End: 2020-11-17

## 2020-11-18 ENCOUNTER — TELEPHONE (OUTPATIENT)
Dept: FAMILY MEDICINE CLINIC | Facility: CLINIC | Age: 85
End: 2020-11-18

## 2020-11-30 ENCOUNTER — TELEPHONE (OUTPATIENT)
Dept: FAMILY MEDICINE CLINIC | Facility: CLINIC | Age: 85
End: 2020-11-30

## 2020-11-30 DIAGNOSIS — E78.00 PURE HYPERCHOLESTEROLEMIA: Primary | ICD-10-CM

## 2020-11-30 RX ORDER — SIMVASTATIN 10 MG
TABLET ORAL
Qty: 48 TABLET | Refills: 3 | Status: SHIPPED | OUTPATIENT
Start: 2020-11-30 | End: 2021-10-20

## 2020-12-20 PROBLEM — I45.10 RBBB: Status: ACTIVE | Noted: 2020-09-22

## 2020-12-20 PROBLEM — R63.4 WEIGHT LOSS: Status: RESOLVED | Noted: 2020-06-10 | Resolved: 2020-12-20

## 2020-12-20 PROBLEM — I47.1 SVT (SUPRAVENTRICULAR TACHYCARDIA) (HCC): Status: ACTIVE | Noted: 2020-09-22

## 2020-12-20 PROBLEM — R33.9 INCOMPLETE BLADDER EMPTYING: Status: RESOLVED | Noted: 2018-02-06 | Resolved: 2020-12-20

## 2020-12-20 PROBLEM — R00.0 TACHYARRHYTHMIA: Status: RESOLVED | Noted: 2020-09-16 | Resolved: 2020-12-20

## 2020-12-20 PROBLEM — I47.10 SVT (SUPRAVENTRICULAR TACHYCARDIA): Status: ACTIVE | Noted: 2020-09-22

## 2020-12-20 PROBLEM — K21.9 CHRONIC GERD: Status: RESOLVED | Noted: 2017-12-27 | Resolved: 2020-12-20

## 2020-12-20 PROBLEM — I48.0 PAROXYSMAL ATRIAL FIBRILLATION (HCC): Status: RESOLVED | Noted: 2020-06-10 | Resolved: 2020-12-20

## 2020-12-22 ENCOUNTER — OFFICE VISIT (OUTPATIENT)
Dept: FAMILY MEDICINE CLINIC | Facility: CLINIC | Age: 85
End: 2020-12-22
Payer: COMMERCIAL

## 2020-12-22 VITALS
WEIGHT: 131.8 LBS | RESPIRATION RATE: 16 BRPM | HEIGHT: 70 IN | SYSTOLIC BLOOD PRESSURE: 146 MMHG | DIASTOLIC BLOOD PRESSURE: 80 MMHG | TEMPERATURE: 97.1 F | BODY MASS INDEX: 18.87 KG/M2 | HEART RATE: 64 BPM

## 2020-12-22 DIAGNOSIS — D50.8 IRON DEFICIENCY ANEMIA SECONDARY TO INADEQUATE DIETARY IRON INTAKE: ICD-10-CM

## 2020-12-22 DIAGNOSIS — N13.8 BPH WITH URINARY OBSTRUCTION: ICD-10-CM

## 2020-12-22 DIAGNOSIS — N40.1 BPH WITH URINARY OBSTRUCTION: ICD-10-CM

## 2020-12-22 DIAGNOSIS — C15.5 PRIMARY ADENOCARCINOMA OF DISTAL THIRD OF ESOPHAGUS (HCC): ICD-10-CM

## 2020-12-22 DIAGNOSIS — E78.00 PURE HYPERCHOLESTEROLEMIA: ICD-10-CM

## 2020-12-22 DIAGNOSIS — R79.89 ELEVATED TSH: ICD-10-CM

## 2020-12-22 DIAGNOSIS — I47.1 SVT (SUPRAVENTRICULAR TACHYCARDIA) (HCC): ICD-10-CM

## 2020-12-22 DIAGNOSIS — I10 ESSENTIAL HYPERTENSION: ICD-10-CM

## 2020-12-22 DIAGNOSIS — E11.9 TYPE 2 DIABETES MELLITUS WITHOUT COMPLICATION, WITHOUT LONG-TERM CURRENT USE OF INSULIN (HCC): Primary | ICD-10-CM

## 2020-12-22 PROCEDURE — 3079F DIAST BP 80-89 MM HG: CPT | Performed by: FAMILY MEDICINE

## 2020-12-22 PROCEDURE — 3077F SYST BP >= 140 MM HG: CPT | Performed by: FAMILY MEDICINE

## 2020-12-22 PROCEDURE — 99214 OFFICE O/P EST MOD 30 MIN: CPT | Performed by: FAMILY MEDICINE

## 2021-01-14 LAB — HBA1C MFR BLD HPLC: 6 %

## 2021-02-02 ENCOUNTER — TELEMEDICINE (OUTPATIENT)
Dept: FAMILY MEDICINE CLINIC | Facility: CLINIC | Age: 86
End: 2021-02-02
Payer: COMMERCIAL

## 2021-02-02 VITALS
SYSTOLIC BLOOD PRESSURE: 150 MMHG | DIASTOLIC BLOOD PRESSURE: 82 MMHG | HEART RATE: 60 BPM | BODY MASS INDEX: 18.65 KG/M2 | WEIGHT: 130 LBS

## 2021-02-02 DIAGNOSIS — N13.8 BPH WITH URINARY OBSTRUCTION: ICD-10-CM

## 2021-02-02 DIAGNOSIS — E78.00 PURE HYPERCHOLESTEROLEMIA: ICD-10-CM

## 2021-02-02 DIAGNOSIS — E11.9 TYPE 2 DIABETES MELLITUS WITHOUT COMPLICATION, WITHOUT LONG-TERM CURRENT USE OF INSULIN (HCC): ICD-10-CM

## 2021-02-02 DIAGNOSIS — N40.1 BPH WITH URINARY OBSTRUCTION: ICD-10-CM

## 2021-02-02 DIAGNOSIS — I10 ESSENTIAL HYPERTENSION: ICD-10-CM

## 2021-02-02 DIAGNOSIS — D50.8 IRON DEFICIENCY ANEMIA SECONDARY TO INADEQUATE DIETARY IRON INTAKE: ICD-10-CM

## 2021-02-02 DIAGNOSIS — C15.5 PRIMARY ADENOCARCINOMA OF DISTAL THIRD OF ESOPHAGUS (HCC): Primary | ICD-10-CM

## 2021-02-02 DIAGNOSIS — R79.89 ELEVATED TSH: ICD-10-CM

## 2021-02-02 PROCEDURE — 1036F TOBACCO NON-USER: CPT | Performed by: FAMILY MEDICINE

## 2021-02-02 PROCEDURE — 99213 OFFICE O/P EST LOW 20 MIN: CPT | Performed by: FAMILY MEDICINE

## 2021-02-02 PROCEDURE — 1160F RVW MEDS BY RX/DR IN RCRD: CPT | Performed by: FAMILY MEDICINE

## 2021-02-02 RX ORDER — LOSARTAN POTASSIUM 100 MG/1
100 TABLET ORAL DAILY
Qty: 90 TABLET | Refills: 3 | Status: SHIPPED | OUTPATIENT
Start: 2021-02-02 | End: 2022-01-19

## 2021-02-02 NOTE — PROGRESS NOTES
Virtual Brief Visit    Pt presents for virtual visit  Does not have video capability  Assessment/Plan:    1, Esophageal Cancer - followed at [facility]; Recent visit January 2021  Most recent CT revealed no evidence of current disease  He remains on omeprazole for GI prophylaxis BID  2  DM - A1C is 6 0; GFR is 75; he remains on metformin 500mg BID  He can decrease to 500mg daily  3  Iron def anemia - Hgb is 11 2; stable; Has has not been taking his iron supplements; Restart Feosol 1-2 daily    4  Elevated TSH - normalized; now 2 8    5  Lipids - restarted simvastatin; Chol 181, , Trig 98, LDL 48  Continue current regimen  6  HTN - elevated; on losartan 50mg  Increase to 100mg daily  7  BPH - stable on tamsulosin and finasteride  8  Colonoscopy? EGD? - Pt will be seeing Dr Colt Michaud this month  F/U in office in the Spring        Reason for visit is   Chief Complaint   Patient presents with   [facility] Brief Visit        Encounter provider Crystal Loera MD    Provider located at [address] [place] [id number]    Recent Visits  No visits were found meeting these conditions  Showing recent visits within past 7 days and meeting all other requirements     Today's Visits  Date Type Provider Dept   02/02/21 Radha Zimmer MD [facility]   Showing today's visits and meeting all other requirements     Future Appointments  No visits were found meeting these conditions  Showing future appointments within next 150 days and meeting all other requirements        After connecting through telephone, the patient was identified by name and date of birth  Nuzhat Garcia was informed that this is a telemedicine visit and that the visit is being conducted through telephone  My office door was closed  No one else was in the room  He acknowledged consent and understanding of privacy and security of the platform   The patient has agreed to participate and understands he can discontinue the visit at any time  Patient is aware this is a billable service  Subjective    Jay Harrison is a 82 y o  male   Review labs  Appetite is better  BMs 3-4 times a day  Seeing Dr Molina Nicolas this month for EGD and maybe colonoscopy  Past Medical History:   Diagnosis Date    Anemia     Castellanos's esophagus without dysplasia 2006    BPH with obstruction/lower urinary tract symptoms     Chronic GERD 12/27/2017    Chronic pain disorder     lumbo sacral, pt had surg and pain is improved    Colon polyp 2006    Cough     Diabetes mellitus ([facility] [address] [facility]     Diverticulosis     Elevated PSA     Erectile dysfunction     Frequency of micturition     GERD (gastroesophageal reflux disease)     Hiatal hernia     History of Helicobacter pylori infection 2006    Hyperlipidemia     Hypertension     Paroxysmal atrial fibrillation ([facility] [address] ) 6/10/2020    Last Assessment & Plan:  - Afib RVR post op day #6 at [facility] s/p thoracoscopy/lympadenectomy rate rhythm control with diltiazem and amiodarone  No AC started  - Amio washout completed, 2 week Zio patch monitor to r/o afib recommended, this was explained in detail with pt and his wife  - Educated on the S/S of atrial fibrillation, TIA, CVA and he is instructed to call for any heart racing or     Tobacco use disorder 12/22/2010    Last Assessment & Plan:  Pt has been smoking less than 3 pp week not interested in quitting     Weight loss 6/10/2020    Last Assessment & Plan:  - Unplanned weight loss accompanied by poor appetite and poor po intake since surgery - He has visit with surgeon tomorrow- advised to discuss his symptoms/weight loss - Recommended close follow up with PCP as well         Past Surgical History:   Procedure Laterality Date    APPENDECTOMY      BACK SURGERY      CATARACT EXTRACTION Bilateral     COLONOSCOPY      LUMBAR LAMINECTOMY      MASTOID SURGERY      NASAL SEPTUM SURGERY      VASECTOMY         Current Outpatient Medications   Medication Sig Dispense Refill    finasteride (PROSCAR) 5 mg tablet Take 1 tablet (5 mg total) by mouth daily 90 tablet 3    losartan (COZAAR) 100 MG tablet Take 1 tablet (100 mg total) by mouth daily 90 tablet 3    metFORMIN (GLUCOPHAGE) 500 mg tablet Take 1 tablet (500 mg total) by mouth 2 (two) times a day with meals 180 tablet 3    omeprazole (PriLOSEC) 40 MG capsule Take 1 capsule (40 mg total) by mouth 2 (two) times a day 180 capsule 3    simvastatin (ZOCOR) 10 mg tablet Take one tab po 4 times per week  48 tablet 3    tamsulosin (FLOMAX) 0 4 mg Take 1 capsule (0 4 mg total) by mouth daily with dinner 90 capsule 3     No current facility-administered medications for this visit  Allergies   Allergen Reactions    Other        Review of Systems   Constitutional: Negative  HENT: Negative  Eyes: Negative  Respiratory: Negative  Cardiovascular: Negative  Gastrointestinal:        Frequent BMs   Neurological: Negative  Hematological: Negative  Not taking iron tabs  Vitals:    02/02/21 0927 02/02/21 0932   BP: 150/82    Pulse: 60    Weight:  59 kg (130 lb)              I spent 20 minutes directly with the patient during this visit    VIRTUAL VISIT DISCLAIMER    Sami Bush acknowledges that he has consented to an online visit or consultation  He understands that the online visit is based solely on information provided by him, and that, in the absence of a face-to-face physical evaluation by the physician, the diagnosis he receives is both limited and provisional in terms of accuracy and completeness  This is not intended to replace a full medical face-to-face evaluation by the physician  Sami Bush understands and accepts these terms

## 2021-04-13 ENCOUNTER — APPOINTMENT (OUTPATIENT)
Dept: LAB | Facility: MEDICAL CENTER | Age: 86
End: 2021-04-13
Payer: COMMERCIAL

## 2021-04-13 DIAGNOSIS — I10 ESSENTIAL HYPERTENSION: ICD-10-CM

## 2021-04-13 DIAGNOSIS — N13.8 BPH WITH URINARY OBSTRUCTION: ICD-10-CM

## 2021-04-13 DIAGNOSIS — E11.9 TYPE 2 DIABETES MELLITUS WITHOUT COMPLICATION, WITHOUT LONG-TERM CURRENT USE OF INSULIN (HCC): ICD-10-CM

## 2021-04-13 DIAGNOSIS — N40.1 BPH WITH URINARY OBSTRUCTION: ICD-10-CM

## 2021-04-13 DIAGNOSIS — R79.89 ELEVATED TSH: ICD-10-CM

## 2021-04-13 DIAGNOSIS — D50.8 IRON DEFICIENCY ANEMIA SECONDARY TO INADEQUATE DIETARY IRON INTAKE: ICD-10-CM

## 2021-04-13 DIAGNOSIS — E78.00 PURE HYPERCHOLESTEROLEMIA: ICD-10-CM

## 2021-04-13 DIAGNOSIS — C15.5 PRIMARY ADENOCARCINOMA OF DISTAL THIRD OF ESOPHAGUS (HCC): ICD-10-CM

## 2021-04-13 DIAGNOSIS — I47.1 SVT (SUPRAVENTRICULAR TACHYCARDIA) (HCC): ICD-10-CM

## 2021-04-13 LAB
ALBUMIN SERPL BCP-MCNC: 3.3 G/DL (ref 3.5–5)
ALP SERPL-CCNC: 80 U/L (ref 46–116)
ALT SERPL W P-5'-P-CCNC: 22 U/L (ref 12–78)
ANION GAP SERPL CALCULATED.3IONS-SCNC: 2 MMOL/L (ref 4–13)
AST SERPL W P-5'-P-CCNC: 13 U/L (ref 5–45)
BASOPHILS # BLD AUTO: 0.03 THOUSANDS/ΜL (ref 0–0.1)
BASOPHILS NFR BLD AUTO: 1 % (ref 0–1)
BILIRUB SERPL-MCNC: 0.62 MG/DL (ref 0.2–1)
BUN SERPL-MCNC: 25 MG/DL (ref 5–25)
CALCIUM ALBUM COR SERPL-MCNC: 9.9 MG/DL (ref 8.3–10.1)
CALCIUM SERPL-MCNC: 9.3 MG/DL (ref 8.3–10.1)
CHLORIDE SERPL-SCNC: 109 MMOL/L (ref 100–108)
CHOLEST SERPL-MCNC: 183 MG/DL (ref 50–200)
CO2 SERPL-SCNC: 31 MMOL/L (ref 21–32)
CREAT SERPL-MCNC: 1.09 MG/DL (ref 0.6–1.3)
EOSINOPHIL # BLD AUTO: 0.12 THOUSAND/ΜL (ref 0–0.61)
EOSINOPHIL NFR BLD AUTO: 2 % (ref 0–6)
ERYTHROCYTE [DISTWIDTH] IN BLOOD BY AUTOMATED COUNT: 14 % (ref 11.6–15.1)
EST. AVERAGE GLUCOSE BLD GHB EST-MCNC: 126 MG/DL
GFR SERPL CREATININE-BSD FRML MDRD: 62 ML/MIN/1.73SQ M
GLUCOSE P FAST SERPL-MCNC: 104 MG/DL (ref 65–99)
HBA1C MFR BLD: 6 %
HCT VFR BLD AUTO: 35.6 % (ref 36.5–49.3)
HDLC SERPL-MCNC: 101 MG/DL
HGB BLD-MCNC: 11.1 G/DL (ref 12–17)
IMM GRANULOCYTES # BLD AUTO: 0.03 THOUSAND/UL (ref 0–0.2)
IMM GRANULOCYTES NFR BLD AUTO: 1 % (ref 0–2)
LDLC SERPL CALC-MCNC: 59 MG/DL (ref 0–100)
LYMPHOCYTES # BLD AUTO: 0.84 THOUSANDS/ΜL (ref 0.6–4.47)
LYMPHOCYTES NFR BLD AUTO: 15 % (ref 14–44)
MCH RBC QN AUTO: 30.3 PG (ref 26.8–34.3)
MCHC RBC AUTO-ENTMCNC: 31.2 G/DL (ref 31.4–37.4)
MCV RBC AUTO: 97 FL (ref 82–98)
MONOCYTES # BLD AUTO: 0.72 THOUSAND/ΜL (ref 0.17–1.22)
MONOCYTES NFR BLD AUTO: 13 % (ref 4–12)
NEUTROPHILS # BLD AUTO: 3.99 THOUSANDS/ΜL (ref 1.85–7.62)
NEUTS SEG NFR BLD AUTO: 68 % (ref 43–75)
NONHDLC SERPL-MCNC: 82 MG/DL
NRBC BLD AUTO-RTO: 0 /100 WBCS
PLATELET # BLD AUTO: 176 THOUSANDS/UL (ref 149–390)
PMV BLD AUTO: 11.9 FL (ref 8.9–12.7)
POTASSIUM SERPL-SCNC: 4.5 MMOL/L (ref 3.5–5.3)
PROT SERPL-MCNC: 6.7 G/DL (ref 6.4–8.2)
RBC # BLD AUTO: 3.66 MILLION/UL (ref 3.88–5.62)
SODIUM SERPL-SCNC: 142 MMOL/L (ref 136–145)
T4 SERPL-MCNC: 7.4 UG/DL (ref 4.7–13.3)
TRIGL SERPL-MCNC: 115 MG/DL
TSH SERPL DL<=0.05 MIU/L-ACNC: 3.08 UIU/ML (ref 0.36–3.74)
WBC # BLD AUTO: 5.73 THOUSAND/UL (ref 4.31–10.16)

## 2021-04-13 PROCEDURE — 84443 ASSAY THYROID STIM HORMONE: CPT

## 2021-04-13 PROCEDURE — 83036 HEMOGLOBIN GLYCOSYLATED A1C: CPT

## 2021-04-13 PROCEDURE — 80061 LIPID PANEL: CPT

## 2021-04-13 PROCEDURE — 84436 ASSAY OF TOTAL THYROXINE: CPT

## 2021-04-13 PROCEDURE — 85025 COMPLETE CBC W/AUTO DIFF WBC: CPT

## 2021-04-13 PROCEDURE — 36415 COLL VENOUS BLD VENIPUNCTURE: CPT

## 2021-04-13 PROCEDURE — 80053 COMPREHEN METABOLIC PANEL: CPT

## 2021-04-21 ENCOUNTER — OFFICE VISIT (OUTPATIENT)
Dept: FAMILY MEDICINE CLINIC | Facility: CLINIC | Age: 86
End: 2021-04-21
Payer: COMMERCIAL

## 2021-04-21 VITALS
BODY MASS INDEX: 21.21 KG/M2 | HEIGHT: 69 IN | WEIGHT: 143.2 LBS | DIASTOLIC BLOOD PRESSURE: 62 MMHG | HEART RATE: 68 BPM | SYSTOLIC BLOOD PRESSURE: 122 MMHG

## 2021-04-21 DIAGNOSIS — D50.8 IRON DEFICIENCY ANEMIA SECONDARY TO INADEQUATE DIETARY IRON INTAKE: ICD-10-CM

## 2021-04-21 DIAGNOSIS — N13.8 BPH WITH URINARY OBSTRUCTION: ICD-10-CM

## 2021-04-21 DIAGNOSIS — C15.5 PRIMARY ADENOCARCINOMA OF DISTAL THIRD OF ESOPHAGUS (HCC): ICD-10-CM

## 2021-04-21 DIAGNOSIS — E11.9 TYPE 2 DIABETES MELLITUS WITHOUT COMPLICATION, WITHOUT LONG-TERM CURRENT USE OF INSULIN (HCC): ICD-10-CM

## 2021-04-21 DIAGNOSIS — I10 ESSENTIAL HYPERTENSION: Primary | ICD-10-CM

## 2021-04-21 DIAGNOSIS — E78.00 PURE HYPERCHOLESTEROLEMIA: ICD-10-CM

## 2021-04-21 DIAGNOSIS — N40.1 BPH WITH URINARY OBSTRUCTION: ICD-10-CM

## 2021-04-21 PROCEDURE — 1160F RVW MEDS BY RX/DR IN RCRD: CPT | Performed by: FAMILY MEDICINE

## 2021-04-21 PROCEDURE — 99214 OFFICE O/P EST MOD 30 MIN: CPT | Performed by: FAMILY MEDICINE

## 2021-04-21 PROCEDURE — 3078F DIAST BP <80 MM HG: CPT | Performed by: FAMILY MEDICINE

## 2021-04-21 PROCEDURE — 1036F TOBACCO NON-USER: CPT | Performed by: FAMILY MEDICINE

## 2021-04-21 PROCEDURE — 3074F SYST BP LT 130 MM HG: CPT | Performed by: FAMILY MEDICINE

## 2021-04-21 NOTE — PROGRESS NOTES
Assessment and Plan:    Mr Chencho Faria is an 28-year-old white male who presents today to follow-up on chronic medical conditions  His wife is with him today  He continues to get stronger and build endurance since his esophageal surgery in 5/2020  He has gained weight and is feeling well without any acute complaints  He has BW to review  1  Hypertension - blood pressure is well controlled  Continue losartan    2  Diabetes - diabetes is under excellent control  Hemoglobin A1c is 6 0  Continue metformin 500 mg daily  3  Iron deficiency anemia - hemoglobin remains stable at 11 1  Continue iron supplements  4  Hyperlipidemia - lipids are under excellent control  Cholesterol is 183, triglycerides 115,  and LDL 59  Continue simvastatin 10 mg daily  5  BPH - stable  Continue tamsulosin and finasteride  Follow-up with urology  6  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 - occurred December 2019  Treated at [facility] with surgery 5/2020  [place] Patient is stable  Weight has increased  He is due for EGD surveillance with Dr Hua Cancer  F/U 4 months  Subjective:      Patient ID: Deanna Nieves is a 82 y o  male  CC:    Chief Complaint   Patient presents with    Follow-up    Diabetes    Hypertension       HPI:    Pleasant 28-year-old white male presents today to follow-up on chronic medical conditions  His wife is with him today  He continues to get stronger and build endurance since his esophageal surgery in 5/2020  He has gained weight  He is feeling well without any acute complaints  He has BW to review  The following portions of the patient's history were reviewed and updated as appropriate: allergies, past family history, past medical history, past social history, past surgical history and problem list       Review of Systems   Constitutional:        See HPI   HENT: Negative for congestion, ear pain, mouth sores, sinus pressure and trouble swallowing      Eyes: Negative for discharge, redness and itching  Respiratory: Negative for apnea, cough, chest tightness, shortness of breath, wheezing and stridor  Cardiovascular: Negative for chest pain, palpitations and leg swelling  Gastrointestinal: Negative for abdominal distention, abdominal pain, blood in stool, constipation, diarrhea, nausea and vomiting  Endocrine: Negative for cold intolerance and heat intolerance  Genitourinary: Negative for difficulty urinating, dysuria, flank pain and urgency  Musculoskeletal: Negative for arthralgias and myalgias  Skin: Negative for rash  Neurological: Negative for dizziness, seizures, syncope, speech difficulty, weakness, light-headedness, numbness and headaches  Hematological: Negative for adenopathy  Psychiatric/Behavioral: Negative for agitation, behavioral problems, confusion and sleep disturbance  The patient is not nervous/anxious  Data to review:       Objective:    Vitals:    04/21/21 1103   BP: 122/62   BP Location: Left arm   Patient Position: Sitting   Pulse: 68   Weight: 65 kg (143 lb 3 2 oz)   Height: 5' 9" (1 753 m)        Physical Exam  Constitutional:       General: He is not in acute distress  Appearance: Normal appearance  He is well-developed  He is not toxic-appearing  HENT:      Head: Normocephalic and atraumatic  Eyes:      General: No scleral icterus  Conjunctiva/sclera: Conjunctivae normal    Neck:      Musculoskeletal: Normal range of motion and neck supple  Vascular: No carotid bruit  Cardiovascular:      Rate and Rhythm: Normal rate and regular rhythm  Heart sounds: Normal heart sounds  No murmur  No friction rub  No gallop  Pulmonary:      Effort: Pulmonary effort is normal  No respiratory distress  Breath sounds: Normal breath sounds  No wheezing or rales  Musculoskeletal: Normal range of motion  General: No tenderness  Right lower leg: No edema  Left lower leg: No edema  Lymphadenopathy:      Cervical: No cervical adenopathy  Skin:     General: Skin is warm and dry  Neurological:      General: No focal deficit present  Mental Status: He is alert and oriented to person, place, and time  Psychiatric:         Mood and Affect: Mood normal          Behavior: Behavior normal          Thought Content:  Thought content normal          Judgment: Judgment normal

## 2021-05-03 PROCEDURE — 88305 TISSUE EXAM BY PATHOLOGIST: CPT | Performed by: PATHOLOGY

## 2021-05-04 ENCOUNTER — LAB REQUISITION (OUTPATIENT)
Dept: LAB | Facility: HOSPITAL | Age: 86
End: 2021-05-04
Payer: COMMERCIAL

## 2021-05-04 DIAGNOSIS — D00.1 CARCINOMA IN SITU OF ESOPHAGUS: ICD-10-CM

## 2021-05-06 LAB
LEFT EYE DIABETIC RETINOPATHY: NORMAL
RIGHT EYE DIABETIC RETINOPATHY: NORMAL

## 2021-06-29 DIAGNOSIS — N13.8 BPH WITH URINARY OBSTRUCTION: Primary | ICD-10-CM

## 2021-06-29 DIAGNOSIS — N40.1 BPH WITH URINARY OBSTRUCTION: Primary | ICD-10-CM

## 2021-06-29 NOTE — TELEPHONE ENCOUNTER
Pretty from [place] calling office to ask office if we can get medication Proscar sent to [facility]

## 2021-06-29 NOTE — TELEPHONE ENCOUNTER
Call placed to patient and [facility] for him to contact the office to further address his questions  Office number provided for call back  **According to patient's chart on 5-, patient saw GI doctor and they discontinued Proscar  NO reason was given   Will need to clarify further with patient when he calls back**

## 2021-06-29 NOTE — TELEPHONE ENCOUNTER
Patient returning call for clinical team   Patient states he is still taking Proscar medication    Patient is also taking tamsulosin 0 4 mg

## 2021-06-29 NOTE — TELEPHONE ENCOUNTER
Patient managed by Dr Billie Cabrera [facility] patient called in asking why finasteride has been discontinued   Patient can be reached at [phone]

## 2021-06-29 NOTE — TELEPHONE ENCOUNTER
Called pt and informed him that Proscar was discontinued at his GI visit on 5-19-21  There is no reason in the office note and may have been a mistake  Pt states he is going to call his GI office and ask them why it was discontinued in his chart and that he is still taking it

## 2021-06-30 RX ORDER — FINASTERIDE 5 MG/1
5 TABLET, FILM COATED ORAL DAILY
Qty: 90 TABLET | Refills: 3 | Status: SHIPPED | OUTPATIENT
Start: 2021-06-30 | End: 2022-06-14

## 2021-06-30 NOTE — TELEPHONE ENCOUNTER
The patient has an upcoming office visit scheduled for 10/15/21 with Dr Jose De Jesus Laurent in the [facility] location but will run out of medication until then    Script for the requested medication (Finasteride 5mg) was queued and forwarded to the Advanced Practitioner covering the [facility] location for approval

## 2021-08-23 PROBLEM — R79.89 ELEVATED TSH: Status: RESOLVED | Noted: 2020-09-15 | Resolved: 2021-08-23

## 2021-08-24 ENCOUNTER — OFFICE VISIT (OUTPATIENT)
Dept: FAMILY MEDICINE CLINIC | Facility: CLINIC | Age: 86
End: 2021-08-24
Payer: COMMERCIAL

## 2021-08-24 VITALS
HEIGHT: 69 IN | DIASTOLIC BLOOD PRESSURE: 60 MMHG | WEIGHT: 146 LBS | HEART RATE: 68 BPM | SYSTOLIC BLOOD PRESSURE: 138 MMHG | BODY MASS INDEX: 21.62 KG/M2

## 2021-08-24 DIAGNOSIS — N13.8 BPH WITH URINARY OBSTRUCTION: ICD-10-CM

## 2021-08-24 DIAGNOSIS — C15.5 PRIMARY ADENOCARCINOMA OF DISTAL THIRD OF ESOPHAGUS (HCC): ICD-10-CM

## 2021-08-24 DIAGNOSIS — E11.9 TYPE 2 DIABETES MELLITUS WITHOUT COMPLICATION, WITHOUT LONG-TERM CURRENT USE OF INSULIN (HCC): Primary | ICD-10-CM

## 2021-08-24 DIAGNOSIS — E78.00 PURE HYPERCHOLESTEROLEMIA: ICD-10-CM

## 2021-08-24 DIAGNOSIS — D50.8 IRON DEFICIENCY ANEMIA SECONDARY TO INADEQUATE DIETARY IRON INTAKE: ICD-10-CM

## 2021-08-24 DIAGNOSIS — G47.33 OBSTRUCTIVE SLEEP APNEA: ICD-10-CM

## 2021-08-24 DIAGNOSIS — E53.8 VITAMIN B12 DEFICIENCY: ICD-10-CM

## 2021-08-24 DIAGNOSIS — N40.1 BPH WITH URINARY OBSTRUCTION: ICD-10-CM

## 2021-08-24 DIAGNOSIS — I10 ESSENTIAL HYPERTENSION: ICD-10-CM

## 2021-08-24 PROCEDURE — 1036F TOBACCO NON-USER: CPT | Performed by: FAMILY MEDICINE

## 2021-08-24 PROCEDURE — 1160F RVW MEDS BY RX/DR IN RCRD: CPT | Performed by: FAMILY MEDICINE

## 2021-08-24 PROCEDURE — 3075F SYST BP GE 130 - 139MM HG: CPT | Performed by: FAMILY MEDICINE

## 2021-08-24 PROCEDURE — 3725F SCREEN DEPRESSION PERFORMED: CPT | Performed by: FAMILY MEDICINE

## 2021-08-24 PROCEDURE — 3078F DIAST BP <80 MM HG: CPT | Performed by: FAMILY MEDICINE

## 2021-08-24 PROCEDURE — 99214 OFFICE O/P EST MOD 30 MIN: CPT | Performed by: FAMILY MEDICINE

## 2021-08-24 NOTE — PROGRESS NOTES
Assessment and Plan:    Mr Ji Richard  Is an 62-year-old white male who presents today to follow-up on his chronic medical conditions  He has a recent medical history of esophageal cancer, treated at [facility] and is doing remarkably well  He has gained weight  He has increased his exercise tolerance up to 6 miles of walking, including [facility]  He has no acute complaints today  His wife accompanies him and has several questions  1  Diabetes mellitus -  Hemoglobin A1c is normal 6 0  Continue metformin  Patient is due for blood work  2  Hypertension -   Blood pressure stable  Continue losartan    3  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 -  Follow-up with [facility]  Recent CT scan shows no evidence of recurrence  Patient is a candidate for  3rd COVID 19 vaccination  They prefer to go through [facility] as that is within a received their 1st ones  4  Obstructive sleep apnea -   Continue with CPAP    5  BPH -  Stable on tamsulosin and finasteride    6  Hyperlipidemia -  Continue on simvastatin    7  Iron deficiency anemia -  Patient remains on iron supplements  Check CBC  8  Vitamin B12 deficiency -  Patient has been off his B12 supplements for the last year  Check vitamin B12 and methylmalonic acid levels and call with results  Follow-up 3-4 months  Subjective:      Patient ID: Irina Crenshaw is a 82 y o  male  CC:    Chief Complaint   Patient presents with    Follow-up     patient is here for a 4 month f/u re: chronic medical conditions  ak       HPI:      Mr Ji Richard  Is an 62-year-old white male who presents today to follow-up on his chronic medical conditions  He has a recent medical history of esophageal cancer, treated at [facility] and is doing remarkably well  He has gained weight  He has increased his exercise tolerance up to 6 miles of walking, including [facility]  He has no acute complaints today    His wife accompanies him and has several questions  Off B12  Wife questions 3rd covid booster  Flu vaccine? Fasting for [employer] holidays      The following portions of the patient's history were reviewed and updated as appropriate: allergies, current medications, past family history, past medical history, past social history, past surgical history and problem list       Review of Systems   Constitutional:        See HPI   HENT: Negative for congestion, ear pain, mouth sores, sinus pressure and trouble swallowing  Eyes: Negative for discharge, redness and itching  Respiratory: Negative for apnea, cough, chest tightness, shortness of breath, wheezing and stridor  Cardiovascular: Negative for chest pain, palpitations and leg swelling  Gastrointestinal: Negative for abdominal distention, abdominal pain, blood in stool, constipation, diarrhea, nausea and vomiting  Endocrine: Negative for cold intolerance and heat intolerance  Genitourinary: Negative for difficulty urinating, dysuria, flank pain and urgency  Musculoskeletal: Negative for arthralgias and myalgias  Skin: Negative for rash  Neurological: Negative for dizziness, seizures, syncope, speech difficulty, weakness, light-headedness, numbness and headaches  Hematological: Negative for adenopathy  Psychiatric/Behavioral: Negative for agitation, behavioral problems, confusion and sleep disturbance  The patient is not nervous/anxious  Data to review:       Objective:    Vitals:    08/24/21 1021   BP: 138/60   Pulse: 68   Weight: 66 2 kg (146 lb)   Height: 5' 9" (1 753 m)        Physical Exam  Vitals and nursing note reviewed  Constitutional:       General: He is not in acute distress  Appearance: Normal appearance  He is well-developed  He is not ill-appearing, toxic-appearing or diaphoretic  HENT:      Head: Normocephalic and atraumatic  Eyes:      General: No scleral icterus       Conjunctiva/sclera: Conjunctivae normal    Neck:      Vascular: No carotid bruit  Cardiovascular:      Rate and Rhythm: Normal rate and regular rhythm  Heart sounds: Normal heart sounds  No murmur heard  No friction rub  No gallop  Pulmonary:      Effort: Pulmonary effort is normal  No respiratory distress  Breath sounds: Normal breath sounds  No wheezing or rales  Abdominal:      General: Bowel sounds are normal  There is no distension  Palpations: Abdomen is soft  There is no mass  Tenderness: There is no abdominal tenderness  There is no right CVA tenderness, left CVA tenderness, guarding or rebound  Hernia: No hernia is present  Musculoskeletal:         General: Normal range of motion  Cervical back: Normal range of motion and neck supple  Right lower leg: No edema  Left lower leg: No edema  Lymphadenopathy:      Cervical: No cervical adenopathy  Skin:     General: Skin is warm and dry  Coloration: Skin is not jaundiced  Neurological:      General: No focal deficit present  Mental Status: He is alert and oriented to person, place, and time  Psychiatric:         Mood and Affect: Mood normal          Behavior: Behavior normal          Thought Content:  Thought content normal          Judgment: Judgment normal

## 2021-09-21 DIAGNOSIS — E11.9 TYPE 2 DIABETES MELLITUS WITHOUT COMPLICATION, WITHOUT LONG-TERM CURRENT USE OF INSULIN (HCC): ICD-10-CM

## 2021-10-20 DIAGNOSIS — N13.8 BPH WITH URINARY OBSTRUCTION: ICD-10-CM

## 2021-10-20 DIAGNOSIS — E78.00 PURE HYPERCHOLESTEROLEMIA: ICD-10-CM

## 2021-10-20 DIAGNOSIS — N40.1 BPH WITH URINARY OBSTRUCTION: ICD-10-CM

## 2021-10-20 RX ORDER — TAMSULOSIN HYDROCHLORIDE 0.4 MG/1
CAPSULE ORAL
Qty: 90 CAPSULE | Refills: 3 | Status: SHIPPED | OUTPATIENT
Start: 2021-10-20

## 2021-10-20 RX ORDER — SIMVASTATIN 10 MG
TABLET ORAL
Qty: 48 TABLET | Refills: 3 | Status: SHIPPED | OUTPATIENT
Start: 2021-10-20

## 2021-12-06 ENCOUNTER — APPOINTMENT (OUTPATIENT)
Dept: LAB | Facility: MEDICAL CENTER | Age: 86
End: 2021-12-06
Payer: COMMERCIAL

## 2021-12-06 DIAGNOSIS — E53.8 VITAMIN B12 DEFICIENCY: ICD-10-CM

## 2021-12-06 DIAGNOSIS — E11.9 TYPE 2 DIABETES MELLITUS WITHOUT COMPLICATION, WITHOUT LONG-TERM CURRENT USE OF INSULIN (HCC): ICD-10-CM

## 2021-12-06 DIAGNOSIS — D50.8 IRON DEFICIENCY ANEMIA SECONDARY TO INADEQUATE DIETARY IRON INTAKE: ICD-10-CM

## 2021-12-06 DIAGNOSIS — C15.5 PRIMARY ADENOCARCINOMA OF DISTAL THIRD OF ESOPHAGUS (HCC): ICD-10-CM

## 2021-12-06 LAB
BASOPHILS # BLD AUTO: 0.05 THOUSANDS/ΜL (ref 0–0.1)
BASOPHILS NFR BLD AUTO: 1 % (ref 0–1)
EOSINOPHIL # BLD AUTO: 0.12 THOUSAND/ΜL (ref 0–0.61)
EOSINOPHIL NFR BLD AUTO: 2 % (ref 0–6)
ERYTHROCYTE [DISTWIDTH] IN BLOOD BY AUTOMATED COUNT: 14.2 % (ref 11.6–15.1)
FERRITIN SERPL-MCNC: 21 NG/ML (ref 8–388)
HCT VFR BLD AUTO: 38.2 % (ref 36.5–49.3)
HGB BLD-MCNC: 12 G/DL (ref 12–17)
IMM GRANULOCYTES # BLD AUTO: 0.02 THOUSAND/UL (ref 0–0.2)
IMM GRANULOCYTES NFR BLD AUTO: 0 % (ref 0–2)
IRON SATN MFR SERPL: 20 % (ref 20–50)
IRON SERPL-MCNC: 67 UG/DL (ref 65–175)
LYMPHOCYTES # BLD AUTO: 0.66 THOUSANDS/ΜL (ref 0.6–4.47)
LYMPHOCYTES NFR BLD AUTO: 12 % (ref 14–44)
MCH RBC QN AUTO: 30.5 PG (ref 26.8–34.3)
MCHC RBC AUTO-ENTMCNC: 31.4 G/DL (ref 31.4–37.4)
MCV RBC AUTO: 97 FL (ref 82–98)
MONOCYTES # BLD AUTO: 0.66 THOUSAND/ΜL (ref 0.17–1.22)
MONOCYTES NFR BLD AUTO: 12 % (ref 4–12)
NEUTROPHILS # BLD AUTO: 3.98 THOUSANDS/ΜL (ref 1.85–7.62)
NEUTS SEG NFR BLD AUTO: 73 % (ref 43–75)
NRBC BLD AUTO-RTO: 0 /100 WBCS
PLATELET # BLD AUTO: 211 THOUSANDS/UL (ref 149–390)
PMV BLD AUTO: 11.9 FL (ref 8.9–12.7)
RBC # BLD AUTO: 3.93 MILLION/UL (ref 3.88–5.62)
TIBC SERPL-MCNC: 327 UG/DL (ref 250–450)
VIT B12 SERPL-MCNC: 306 PG/ML (ref 100–900)
WBC # BLD AUTO: 5.49 THOUSAND/UL (ref 4.31–10.16)

## 2021-12-06 PROCEDURE — 83036 HEMOGLOBIN GLYCOSYLATED A1C: CPT

## 2021-12-06 PROCEDURE — 83918 ORGANIC ACIDS TOTAL QUANT: CPT

## 2021-12-06 PROCEDURE — 85025 COMPLETE CBC W/AUTO DIFF WBC: CPT

## 2021-12-06 PROCEDURE — 36415 COLL VENOUS BLD VENIPUNCTURE: CPT

## 2021-12-06 PROCEDURE — 82728 ASSAY OF FERRITIN: CPT

## 2021-12-06 PROCEDURE — 83550 IRON BINDING TEST: CPT

## 2021-12-06 PROCEDURE — 83540 ASSAY OF IRON: CPT

## 2021-12-06 PROCEDURE — 82607 VITAMIN B-12: CPT

## 2021-12-07 LAB
EST. AVERAGE GLUCOSE BLD GHB EST-MCNC: 131 MG/DL
HBA1C MFR BLD: 6.2 %

## 2021-12-14 ENCOUNTER — OFFICE VISIT (OUTPATIENT)
Dept: FAMILY MEDICINE CLINIC | Facility: CLINIC | Age: 86
End: 2021-12-14
Payer: COMMERCIAL

## 2021-12-14 VITALS
HEIGHT: 69 IN | HEART RATE: 52 BPM | BODY MASS INDEX: 21.39 KG/M2 | WEIGHT: 144.4 LBS | SYSTOLIC BLOOD PRESSURE: 118 MMHG | DIASTOLIC BLOOD PRESSURE: 74 MMHG

## 2021-12-14 DIAGNOSIS — E11.9 TYPE 2 DIABETES MELLITUS WITHOUT COMPLICATION, WITHOUT LONG-TERM CURRENT USE OF INSULIN (HCC): Primary | ICD-10-CM

## 2021-12-14 DIAGNOSIS — N40.1 BPH WITH URINARY OBSTRUCTION: ICD-10-CM

## 2021-12-14 DIAGNOSIS — I10 ESSENTIAL HYPERTENSION: ICD-10-CM

## 2021-12-14 DIAGNOSIS — Z00.00 ENCOUNTER FOR SUBSEQUENT ANNUAL WELLNESS VISIT (AWV) IN MEDICARE PATIENT: ICD-10-CM

## 2021-12-14 DIAGNOSIS — C15.5 PRIMARY ADENOCARCINOMA OF DISTAL THIRD OF ESOPHAGUS (HCC): ICD-10-CM

## 2021-12-14 DIAGNOSIS — E53.8 VITAMIN B12 DEFICIENCY: ICD-10-CM

## 2021-12-14 DIAGNOSIS — G47.33 OBSTRUCTIVE SLEEP APNEA: ICD-10-CM

## 2021-12-14 DIAGNOSIS — N13.8 BPH WITH URINARY OBSTRUCTION: ICD-10-CM

## 2021-12-14 PROCEDURE — 99214 OFFICE O/P EST MOD 30 MIN: CPT | Performed by: FAMILY MEDICINE

## 2021-12-14 PROCEDURE — 3725F SCREEN DEPRESSION PERFORMED: CPT | Performed by: FAMILY MEDICINE

## 2021-12-14 PROCEDURE — 3288F FALL RISK ASSESSMENT DOCD: CPT | Performed by: FAMILY MEDICINE

## 2021-12-14 PROCEDURE — 3074F SYST BP LT 130 MM HG: CPT | Performed by: FAMILY MEDICINE

## 2021-12-14 PROCEDURE — 1160F RVW MEDS BY RX/DR IN RCRD: CPT | Performed by: FAMILY MEDICINE

## 2021-12-14 PROCEDURE — G0439 PPPS, SUBSEQ VISIT: HCPCS | Performed by: FAMILY MEDICINE

## 2021-12-14 PROCEDURE — 1125F AMNT PAIN NOTED PAIN PRSNT: CPT | Performed by: FAMILY MEDICINE

## 2021-12-14 PROCEDURE — 1170F FXNL STATUS ASSESSED: CPT | Performed by: FAMILY MEDICINE

## 2021-12-14 PROCEDURE — 1036F TOBACCO NON-USER: CPT | Performed by: FAMILY MEDICINE

## 2021-12-15 LAB
METHYLMALONATE SERPL-SCNC: 777 NMOL/L (ref 0–378)
SL AMB DISCLAIMER: ABNORMAL

## 2021-12-28 ENCOUNTER — TELEPHONE (OUTPATIENT)
Dept: FAMILY MEDICINE CLINIC | Facility: CLINIC | Age: 86
End: 2021-12-28

## 2022-01-10 ENCOUNTER — OFFICE VISIT (OUTPATIENT)
Dept: UROLOGY | Facility: MEDICAL CENTER | Age: 87
End: 2022-01-10
Payer: COMMERCIAL

## 2022-01-10 VITALS
WEIGHT: 144 LBS | DIASTOLIC BLOOD PRESSURE: 80 MMHG | HEART RATE: 60 BPM | HEIGHT: 69 IN | BODY MASS INDEX: 21.33 KG/M2 | SYSTOLIC BLOOD PRESSURE: 120 MMHG

## 2022-01-10 DIAGNOSIS — E11.9 TYPE 2 DIABETES MELLITUS WITHOUT COMPLICATION, WITHOUT LONG-TERM CURRENT USE OF INSULIN (HCC): ICD-10-CM

## 2022-01-10 DIAGNOSIS — N40.1 BPH WITH URINARY OBSTRUCTION: Primary | ICD-10-CM

## 2022-01-10 DIAGNOSIS — N13.8 BPH WITH URINARY OBSTRUCTION: Primary | ICD-10-CM

## 2022-01-10 LAB
SL AMB  POCT GLUCOSE, UA: ABNORMAL
SL AMB LEUKOCYTE ESTERASE,UA: ABNORMAL
SL AMB POCT BILIRUBIN,UA: ABNORMAL
SL AMB POCT BLOOD,UA: ABNORMAL
SL AMB POCT CLARITY,UA: CLEAR
SL AMB POCT COLOR,UA: YELLOW
SL AMB POCT KETONES,UA: ABNORMAL
SL AMB POCT NITRITE,UA: ABNORMAL
SL AMB POCT PH,UA: 5.5
SL AMB POCT SPECIFIC GRAVITY,UA: >=1.03
SL AMB POCT URINE PROTEIN: ABNORMAL
SL AMB POCT UROBILINOGEN: 1

## 2022-01-10 PROCEDURE — 1036F TOBACCO NON-USER: CPT | Performed by: UROLOGY

## 2022-01-10 PROCEDURE — 3074F SYST BP LT 130 MM HG: CPT | Performed by: UROLOGY

## 2022-01-10 PROCEDURE — 1160F RVW MEDS BY RX/DR IN RCRD: CPT | Performed by: UROLOGY

## 2022-01-10 PROCEDURE — 3079F DIAST BP 80-89 MM HG: CPT | Performed by: UROLOGY

## 2022-01-10 PROCEDURE — 81003 URINALYSIS AUTO W/O SCOPE: CPT | Performed by: UROLOGY

## 2022-01-10 PROCEDURE — 99213 OFFICE O/P EST LOW 20 MIN: CPT | Performed by: UROLOGY

## 2022-01-10 NOTE — PROGRESS NOTES
Assessment/Plan:  BPH with obstructive symptoms  -stable symptomatic [place] with the patient overall satisfied with voiding pattern particularly on tamsulosin and finasteride  Recent refills were arranged  Denies gross hematuria or dysuria -continue present therapy  No problem-specific Assessment & Plan notes found for this encounter  Diagnoses and all orders for this visit:    BPH with urinary obstruction  -     Cancel: POCT Measure PVR  -     POCT urine dip auto non-scope  -     Comprehensive metabolic panel; Future    Type 2 diabetes mellitus without complication, without long-term current use of insulin (HCC)          Subjective:      Patient ID: Marlene Quevedo is a 82 y o  male  HPI  82-year-old gentleman with a longstanding history of obstructive and irritative voiding symptoms  Patient had previously been treated with alpha blockade with 5 alpha reductase inhibition added  The patient now has an AUA symptom score of only 6 coming down from 19 prior  He denies gross hematuria dysuria  The following portions of the patient's history were reviewed and updated as appropriate: allergies, current medications, past family history, past medical history, past social history, past surgical history and problem list     Review of Systems   Genitourinary:        AUA symptom score is 6 with nocturia once nightly 2/5 frequency and weakening of the urinary stream   All other systems reviewed and are negative  Objective:      /80   Pulse 60   Ht 5' 9" (1 753 m)   Wt 65 3 kg (144 lb)   BMI 21 27 kg/m²          Physical Exam  Vitals reviewed  Constitutional:       General: He is not in acute distress  Appearance: Normal appearance  He is normal weight  He is not ill-appearing, toxic-appearing or diaphoretic  HENT:      Head: Normocephalic and atraumatic  Nose: Nose normal    Eyes:      Extraocular Movements: Extraocular movements intact     Pulmonary:      Effort: Pulmonary effort is normal  No respiratory distress  Genitourinary:     Comments: YONNY normal anal verge normal anal sphincter tone no palpable rectal masses  Prostate approximately 20 g a nodular nontender  Skin:     General: Skin is dry  Neurological:      Mental Status: He is alert and oriented to person, place, and time  Psychiatric:         Mood and Affect: Mood normal          Behavior: Behavior normal          Thought Content:  Thought content normal          Judgment: Judgment normal

## 2022-01-19 DIAGNOSIS — I10 ESSENTIAL HYPERTENSION: ICD-10-CM

## 2022-01-19 RX ORDER — LOSARTAN POTASSIUM 100 MG/1
TABLET ORAL
Qty: 90 TABLET | Refills: 3 | Status: SHIPPED | OUTPATIENT
Start: 2022-01-19

## 2022-03-07 DIAGNOSIS — E11.9 TYPE 2 DIABETES MELLITUS WITHOUT COMPLICATION, WITHOUT LONG-TERM CURRENT USE OF INSULIN (HCC): ICD-10-CM

## 2022-03-07 NOTE — TELEPHONE ENCOUNTER
Requested Prescriptions     Pending Prescriptions Disp Refills    metFORMIN (GLUCOPHAGE) 500 mg tablet 180 tablet 3     Sig: Take 1 tablet (500 mg total) by mouth 2 (two) times a day with meals     LOV 12/14/21, F/U 4/12/22 (Canceled/Povider), Labs pending

## 2022-04-27 ENCOUNTER — RA CDI HCC (OUTPATIENT)
Dept: OTHER | Facility: HOSPITAL | Age: 87
End: 2022-04-27

## 2022-04-27 NOTE — PROGRESS NOTES
[facility]  coding opportunities     [id number]     Chart Reviewed number of suggestions sent to Provider: 1     Patients Insurance     Medicare Insurance: [address] [place] [address]

## 2022-05-04 ENCOUNTER — OFFICE VISIT (OUTPATIENT)
Dept: FAMILY MEDICINE CLINIC | Facility: CLINIC | Age: 87
End: 2022-05-04
Payer: COMMERCIAL

## 2022-05-04 VITALS
BODY MASS INDEX: 21.12 KG/M2 | WEIGHT: 142.6 LBS | HEIGHT: 69 IN | RESPIRATION RATE: 16 BRPM | HEART RATE: 64 BPM | DIASTOLIC BLOOD PRESSURE: 60 MMHG | SYSTOLIC BLOOD PRESSURE: 110 MMHG

## 2022-05-04 DIAGNOSIS — C15.5 PRIMARY ADENOCARCINOMA OF DISTAL THIRD OF ESOPHAGUS (HCC): ICD-10-CM

## 2022-05-04 DIAGNOSIS — Z11.52 ENCOUNTER FOR SCREENING FOR COVID-19: Primary | ICD-10-CM

## 2022-05-04 DIAGNOSIS — N40.1 BPH WITH URINARY OBSTRUCTION: ICD-10-CM

## 2022-05-04 DIAGNOSIS — I10 ESSENTIAL HYPERTENSION: ICD-10-CM

## 2022-05-04 DIAGNOSIS — G47.33 OBSTRUCTIVE SLEEP APNEA: ICD-10-CM

## 2022-05-04 DIAGNOSIS — N13.8 BPH WITH URINARY OBSTRUCTION: ICD-10-CM

## 2022-05-04 DIAGNOSIS — E11.9 TYPE 2 DIABETES MELLITUS WITHOUT COMPLICATION, WITHOUT LONG-TERM CURRENT USE OF INSULIN (HCC): Primary | ICD-10-CM

## 2022-05-04 DIAGNOSIS — E78.00 PURE HYPERCHOLESTEROLEMIA: ICD-10-CM

## 2022-05-04 DIAGNOSIS — E53.8 VITAMIN B12 DEFICIENCY: ICD-10-CM

## 2022-05-04 LAB — SL AMB POCT HEMOGLOBIN AIC: 5.8 (ref ?–6.5)

## 2022-05-04 PROCEDURE — 99214 OFFICE O/P EST MOD 30 MIN: CPT | Performed by: FAMILY MEDICINE

## 2022-05-04 PROCEDURE — 1036F TOBACCO NON-USER: CPT | Performed by: FAMILY MEDICINE

## 2022-05-04 PROCEDURE — 1160F RVW MEDS BY RX/DR IN RCRD: CPT | Performed by: FAMILY MEDICINE

## 2022-05-04 PROCEDURE — 3078F DIAST BP <80 MM HG: CPT | Performed by: FAMILY MEDICINE

## 2022-05-04 PROCEDURE — 83036 HEMOGLOBIN GLYCOSYLATED A1C: CPT | Performed by: FAMILY MEDICINE

## 2022-05-04 PROCEDURE — 3074F SYST BP LT 130 MM HG: CPT | Performed by: FAMILY MEDICINE

## 2022-05-04 NOTE — PROGRESS NOTES
Assessment and Plan:    72-year-old male with a past medical history of Adeno Ca of the esophagus presents today with his wife to follow-up on chronic medical conditions  He reports feeling well with no acute complaints  He is planning travel to [facility] in May and requests an order for PCR prior to his trip  He continues to exercise and reports good exercise tolerance  His weight has remained stable  1  Diabetes mellitus - hemoglobin A1c has decreased to 5 8  Continue metformin 500 mg b i d     We could consider decreasing or stopping this in the future  2  Hypertension - blood pressure remains under excellent control  Continue losartan  3  Hyperlipidemia - stable on simvastatin 10 mg daily  4  Adenocarcinoma of the esophagus - patient remains clinically well  He is maintaining his weight and exercising without difficulty  He is due for a follow-up CT scan later this month and will follow up in [place] at [facility]   Labs are ordered today  5  BPH - symptoms remains stable  Continue finasteride and tamsulosin  6  B12 deficiency - patient continues on sublingual methylcobalamin  Check vitamin B12 and methylmalonic acid  7  Obstructive sleep apnea -  continue to wear CPAP mask  Labs and follow-up in 4 months  Order for PCR prior to travel ordered for May 17th  Subjective:      Patient ID: Paul Espitia is a 82 y o  male  CC:    Chief Complaint   Patient presents with    Follow-up     pt here for a follow up  Rcruz       HPI:    72-year-old male with a past medical history of Adeno Ca of the esophagus presents today with his wife to follow-up on chronic medical conditions  He reports feeling well with no acute complaints  He is planning travel to [facility] in May and requests an order for PCR prior to his trip  He continues to exercise and reports good exercise tolerance  His weight has remained stable          The following portions of the patient's history were reviewed and updated as appropriate: allergies, current medications, past family history, past medical history, past social history, past surgical history and problem list       Review of Systems   Constitutional:        See HPI   HENT: Negative for congestion, ear pain, mouth sores, sinus pressure and trouble swallowing  Eyes: Negative for discharge, redness and itching  Respiratory: Negative for apnea, cough, chest tightness, shortness of breath, wheezing and stridor  Cardiovascular: Negative for chest pain, palpitations and leg swelling  Gastrointestinal: Negative for abdominal distention, abdominal pain, blood in stool, constipation, diarrhea, nausea and vomiting  Endocrine: Negative for cold intolerance and heat intolerance  Genitourinary: Negative for difficulty urinating, dysuria, flank pain and urgency  Musculoskeletal: Negative for arthralgias and myalgias  Skin: Negative for rash  Neurological: Negative for dizziness, seizures, syncope, speech difficulty, weakness, light-headedness, numbness and headaches  Hematological: Negative for adenopathy  Psychiatric/Behavioral: Negative for agitation, behavioral problems, confusion and sleep disturbance  The patient is not nervous/anxious  Data to review:       Objective:    /60 (BP Location: Right arm, Patient Position: Sitting, Cuff Size: Standard)   Pulse 64   Resp 16   Ht 5' 9" (1 753 m)   Wt 64 7 kg (142 lb 9 6 oz)   BMI 21 06 kg/m²        Physical Exam  Vitals and nursing note reviewed  Constitutional:       General: He is not in acute distress  Appearance: Normal appearance  He is normal weight  He is not ill-appearing  Eyes:      Conjunctiva/sclera: Conjunctivae normal    Neck:      Vascular: No carotid bruit  Cardiovascular:      Rate and Rhythm: Normal rate and regular rhythm  Heart sounds: No murmur heard  No friction rub  No gallop      Pulmonary:      Effort: Pulmonary effort is normal  No respiratory distress  Breath sounds: No stridor  No wheezing, rhonchi or rales  Abdominal:      General: Abdomen is flat  Bowel sounds are normal  There is no distension  Palpations: Abdomen is soft  There is no mass  Tenderness: There is no abdominal tenderness  There is no guarding or rebound  Hernia: No hernia is present  Musculoskeletal:      Cervical back: Normal range of motion  Right lower leg: No edema  Left lower leg: No edema  Skin:     General: Skin is warm  Coloration: Skin is not jaundiced  Neurological:      General: No focal deficit present  Mental Status: He is alert  Psychiatric:         Mood and Affect: Mood normal          Behavior: Behavior normal          Thought Content:  Thought content normal          Judgment: Judgment normal

## 2022-05-17 DIAGNOSIS — Z11.52 ENCOUNTER FOR SCREENING FOR COVID-19: ICD-10-CM

## 2022-05-17 PROCEDURE — U0005 INFEC AGEN DETEC AMPLI PROBE: HCPCS | Performed by: FAMILY MEDICINE

## 2022-05-17 PROCEDURE — U0003 INFECTIOUS AGENT DETECTION BY NUCLEIC ACID (DNA OR RNA); SEVERE ACUTE RESPIRATORY SYNDROME CORONAVIRUS 2 (SARS-COV-2) (CORONAVIRUS DISEASE [COVID-19]), AMPLIFIED PROBE TECHNIQUE, MAKING USE OF HIGH THROUGHPUT TECHNOLOGIES AS DESCRIBED BY CMS-2020-01-R: HCPCS | Performed by: FAMILY MEDICINE

## 2022-05-18 LAB — SARS-COV-2 RNA RESP QL NAA+PROBE: NEGATIVE

## 2022-06-14 DIAGNOSIS — N13.8 BPH WITH URINARY OBSTRUCTION: ICD-10-CM

## 2022-06-14 DIAGNOSIS — N40.1 BPH WITH URINARY OBSTRUCTION: ICD-10-CM

## 2022-06-14 RX ORDER — FINASTERIDE 5 MG/1
TABLET, FILM COATED ORAL
Qty: 90 TABLET | Refills: 3 | Status: SHIPPED | OUTPATIENT
Start: 2022-06-14

## 2022-06-27 ENCOUNTER — LAB REQUISITION (OUTPATIENT)
Dept: LAB | Facility: HOSPITAL | Age: 87
End: 2022-06-27
Payer: COMMERCIAL

## 2022-06-27 DIAGNOSIS — D00.1 CARCINOMA IN SITU OF ESOPHAGUS: ICD-10-CM

## 2022-06-27 PROCEDURE — 88312 SPECIAL STAINS GROUP 1: CPT | Performed by: PATHOLOGY

## 2022-06-27 PROCEDURE — 88305 TISSUE EXAM BY PATHOLOGIST: CPT | Performed by: PATHOLOGY

## 2022-06-27 PROCEDURE — 88342 IMHCHEM/IMCYTCHM 1ST ANTB: CPT | Performed by: PATHOLOGY

## 2022-07-26 ENCOUNTER — TELEMEDICINE (OUTPATIENT)
Dept: FAMILY MEDICINE CLINIC | Facility: CLINIC | Age: 87
End: 2022-07-26
Payer: COMMERCIAL

## 2022-07-26 ENCOUNTER — TELEPHONE (OUTPATIENT)
Dept: FAMILY MEDICINE CLINIC | Facility: CLINIC | Age: 87
End: 2022-07-26

## 2022-07-26 VITALS — HEIGHT: 69 IN | BODY MASS INDEX: 21.08 KG/M2 | WEIGHT: 142.3 LBS

## 2022-07-26 DIAGNOSIS — E78.00 PURE HYPERCHOLESTEROLEMIA: ICD-10-CM

## 2022-07-26 DIAGNOSIS — U07.1 COVID-19 VIRUS INFECTION: Primary | ICD-10-CM

## 2022-07-26 PROCEDURE — 99214 OFFICE O/P EST MOD 30 MIN: CPT | Performed by: NURSE PRACTITIONER

## 2022-07-26 PROCEDURE — 1160F RVW MEDS BY RX/DR IN RCRD: CPT | Performed by: NURSE PRACTITIONER

## 2022-07-26 NOTE — ASSESSMENT & PLAN NOTE
Paxlovid was ordered to be taken as directed over the next 5 days  Since patient is currently asymptomatic I will use today as the start date for his quarantine  Patient was advised to quarantine through 07/30/2022 and that if he remains afebrile for 24 hours without the use of antipyretics starting on 07/30/2022 he may return from isolation on 07/31/2022 and begin to mask for 5 days when in public and when around others  Patient is aware to hold simvastatin while taking Paxlovid

## 2022-07-26 NOTE — ASSESSMENT & PLAN NOTE
Patient's most recent lipid panel was completed in 2021 but was normal at that time  Patient is currently managed on simvastatin 10 mg daily  Patient was advised that simvastatin must be held while taking Paxlovid  Patient was advised that simvastatin can be restarted after finishing Paxlovid

## 2022-07-26 NOTE — PROGRESS NOTES
COVID-19 Outpatient Progress Note    Assessment/Plan:    Problem List Items Addressed This Visit        Other    Hyperlipidemia     Patient's most recent lipid panel was completed in 2021 but was normal at that time  Patient is currently managed on simvastatin 10 mg daily  Patient was advised that simvastatin must be held while taking Paxlovid  Patient was advised that simvastatin can be restarted after finishing Paxlovid  COVID-19 virus infection - Primary     Paxlovid was ordered to be taken as directed over the next 5 days  Since patient is currently asymptomatic I will use today as the start date for his quarantine  Patient was advised to quarantine through 07/30/2022 and that if he remains afebrile for 24 hours without the use of antipyretics starting on 07/30/2022 he may return from isolation on 07/31/2022 and begin to mask for 5 days when in public and when around others  Patient is aware to hold simvastatin while taking Paxlovid  Relevant Medications    nirmatrelvir & ritonavir (Paxlovid) tablet therapy pack         Disposition:     Discussed symptom directed medication options with patient  Patient already tested positive for COVID  Patient meets criteria for PAXLOVID and they have been counseled appropriately according to EUA documentation released by the FDA  After discussion, patient agrees to treatment  Anselmo Rodolfo is an investigational medicine used to treat mild-to-moderate COVID-19 in adults and children (15years of age and older weighing at least 80 pounds (40 kg)) with positive results of direct SARS-CoV-2 viral testing, and who are at high risk for progression to severe COVID-19, including hospitalization or death  PAXLOVID is investigational because it is still being studied  There is limited information about the safety and effectiveness of using PAXLOVID to treat people with mild-to-moderate COVID-19      The FDA has authorized the emergency use of PAXLOVID for the treatment of mild-tomoderate COVID-19 in adults and children (15years of age and older weighing at least 80 pounds (40 kg)) with a positive test for the virus that causes COVID-19, and who are at high risk for progression to severe COVID-19, including hospitalization or death, under an EUA  What should I tell my healthcare provider before I take PAXLOVID? Tell your healthcare provider if you:  - Have any allergies  - Have liver or kidney disease  - Are pregnant or plan to become pregnant  - Are breastfeeding a child  - Have any serious illnesses    Tell your healthcare provider about all the medicines you take, including prescription and over-the-counter medicines, vitamins, and herbal supplements  Some medicines may interact with PAXLOVID and may cause serious side effects  Keep a list of your medicines to show your healthcare provider and pharmacist when you get a new medicine  You can ask your healthcare provider or pharmacist for a list of medicines that interact with PAXLOVID  Do not start taking a new medicine without telling your healthcare provider  Your healthcare provider can tell you if it is safe to take PAXLOVID with other medicines  Tell your healthcare provider if you are taking combined hormonal contraceptive  PAXLOVID may affect how your birth control pills work  Females who are able to become pregnant should use another effective alternative form of contraception or an additional barrier method of contraception  Talk to your healthcare provider if you have any questions about contraceptive methods that might be right for you  How do I take PAXLOVID? PAXLOVID consists of 2 medicines: nirmatrelvir and ritonavir  - Take 2 pink tablets of nirmatrelvir with 1 white tablet of ritonavir by mouth 2 times each day (in the morning and in the evening) for 5 days  For each dose, take all 3 tablets at the same time  - If you have kidney disease, talk to your healthcare provider   You may need a different dose  - Swallow the tablets whole  Do not chew, break, or crush the tablets  - Take PAXLOVID with or without food  - Do not stop taking PAXLOVID without talking to your healthcare provider, even if you feel better  - If you miss a dose of PAXLOVID within 8 hours of the time it is usually taken, take it as soon as you remember  If you miss a dose by more than 8 hours, skip the missed dose and take the next dose at your regular time  Do not take 2 doses of PAXLOVID at the same time  - If you take too much PAXLOVID, call your healthcare provider or go to the nearest hospital emergency room right away  - If you are taking a ritonavir- or cobicistat-containing medicine to treat hepatitis C or Human Immunodeficiency Virus (HIV), you should continue to take your medicine as prescribed by your healthcare provider   - Talk to your healthcare provider if you do not feel better or if you feel worse after 5 days  Who should generally not take PAXLOVID? Do not take PAXLOVID if:  You are allergic to nirmatrelvir, ritonavir, or any of the ingredients in PAXLOVID  You are taking any of the following medicines:  - Alfuzosin  - Pethidine, piroxicam, propoxyphene  - Ranolazine  - Amiodarone, dronedarone, flecainide, propafenone, quinidine  - Colchicine  - Lurasidone, pimozide, clozapine  - Dihydroergotamine, ergotamine, methylergonovine  - Lovastatin, simvastatin  - Sildenafil (Revatio®) for pulmonary arterial hypertension (PAH)  - Triazolam, oral midazolam  - Apalutamide  - Carbamazepine, phenobarbital, phenytoin  - Rifampin  - St  Bos Wort (hypericum perforatum)    What are the important possible side effects of PAXLOVID? Possible side effects of PAXLOVID are:  - Liver Problems   Tell your healthcare provider right away if you have any of these signs and symptoms of liver problems: loss of appetite, yellowing of your skin and the whites of eyes (jaundice), dark-colored urine, pale colored stools and itchy skin, stomach area (abdominal) pain  - Resistance to HIV Medicines  If you have untreated HIV infection, PAXLOVID may lead to some HIV medicines not working as well in the future  - Other possible side effects include: altered sense of taste, diarrhea, high blood pressure, or muscle aches    These are not all the possible side effects of PAXLOVID  Not many people have taken PAXLOVID  Serious and unexpected side effects may happen  Larita Blizzard is still being studied, so it is possible that all of the risks are not known at this time  What other treatment choices are there? Like [place] may allow for the emergency use of other medicines to treat people with COVID-19  Go to https://Private Outlet/ for information on the emergency use of other medicines that are authorized by FDA to treat people with COVID-19  Your healthcare provider may talk with you about clinical trials for which you may be eligible  It is your choice to be treated or not to be treated with PAXLOVID  Should you decide not to receive it or for your child not to receive it, it will not change your standard medical care  What if I am pregnant or breastfeeding? There is no experience treating pregnant women or breastfeeding mothers with PAXLOVID  For a mother and unborn baby, the benefit of taking PAXLOVID may be greater than the risk from the treatment  If you are pregnant, discuss your options and specific situation with your healthcare provider  It is recommended that you use effective barrier contraception or do not have sexual activity while taking PAXLOVID  If you are breastfeeding, discuss your options and specific situation with your healthcare provider  How do I report side effects with PAXLOVID?     Contact your healthcare provider if you have any side effects that bother you or do not go away     Report side effects to FDA MedWatch at www fda gov/medwatch or call 9-430-YHL5164 or you can report side effects to [facility]  at the contact information provided below  Website Fax number Telephone number   [employer] [phone]     How should I store [address]? Store PAXLOVID tablets at room temperature between 68°F to 77°F (20°C to 25°C)  Full fact sheet for patients, parents, and caregivers can be found at: Tay perez    I have spent 15 minutes directly with the patient  Encounter provider SONDRA Garza    Provider located at [address] [facility]  [address] [address] [facility] [address] [place] [id number] [phone]    Recent Visits  No visits were found meeting these conditions  Showing recent visits within past 7 days and meeting all other requirements  Today's Visits  Date Type Provider Dept   07/26/22 Telemedicine [facility]   Showing today's visits and meeting all other requirements  Future Appointments  No visits were found meeting these conditions  Showing future appointments within next 150 days and meeting all other requirements     This virtual check-in was done via [employer] and patient was informed that this is a secure, HIPAA-compliant platform  He agrees to proceed  Patient agrees to participate in a virtual check in via telephone or video visit instead of presenting to the office to address urgent/immediate medical needs  Patient is aware this is a billable service  After connecting through [facility], the patient was identified by name and date of birth  Mervatmicheal Sai was informed that this was a telemedicine visit and that the exam was being conducted confidentially over secure lines  My office door was closed  No one else was in the room  Kg Gibbs acknowledged consent and understanding of privacy and security of the telemedicine visit   I informed the patient that I have reviewed his record in Epic and presented the opportunity for him to ask any questions regarding the visit today  The patient agreed to participate  Verification of patient location:  Patient is located in the following state in which I hold an active license: [place]    Subjective:   Devika Thompson is a 82 y o  male who is concerned about COVID-19  Patient is currently asymptomatic  Patient denies fever, chills, fatigue, malaise, congestion, rhinorrhea, sore throat, anosmia, loss of taste, cough, shortness of breath, chest tightness, abdominal pain, nausea, vomiting, diarrhea, myalgias and headaches  COVID-19 vaccination status: Fully vaccinated with booster    Exposure:   Contact with a person who is under investigation (PUI) for or who is positive for COVID-19 within the last 14 days?: Yes    Hospitalized recently for fever and/or lower respiratory symptoms?: No      Currently a healthcare worker that is involved in direct patient care?: No      Works in a special setting where the risk of COVID-19 transmission may be high? (this may include long-term care, correctional and halfway facilities; homeless shelters; assisted-living facilities and group homes ): No      Resident in a special setting where the risk of COVID-19 transmission may be high? (this may include long-term care, correctional and halfway facilities; homeless shelters; assisted-living facilities and group homes ): No      Patient tested positive for COVID via home test earlier today  Patient's wife also tested positive for COVID via home test earlier today  The patient is currently asymptomatic  The patient is fully vaccinated for COVID  I spoke with patient about the benefits and potential side effects of Paxlovid and the patient was agreeable to taking the medication      Lab Results   Component Value Date    SARSCOV2 Negative 05/17/2022     Past Medical History:   Diagnosis Date    Anemia     Castellanos's esophagus without dysplasia 2006    BPH with obstruction/lower urinary tract symptoms     Chronic GERD 12/27/2017    Chronic pain disorder     lumbo sacral, pt had surg and pain is improved    Colon polyp 2006    Cough     Diabetes mellitus ([facility] )     Diverticulosis     Elevated PSA     Erectile dysfunction     Esophageal adenocarcinoma ([facility] ) 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    Frequency of micturition     GERD (gastroesophageal reflux disease)     Hiatal hernia     History of Helicobacter pylori infection 2006    Hyperlipidemia     Hypertension     Paroxysmal atrial fibrillation ([facility] ) 6/10/2020    Last Assessment & Plan:  - Afib RVR post op day #6 at [place] s/p thoracoscopy/lympadenectomy rate rhythm control with diltiazem and amiodarone  No AC started  - Amio washout completed, 2 week Zio patch monitor to r/o afib recommended, this was explained in detail with pt and his wife  - Educated on the S/S of atrial fibrillation, TIA, CVA and he is instructed to call for any heart racing or     Tobacco use disorder 12/22/2010    Last Assessment & Plan:  Pt has been smoking less than 3 pp week not interested in quitting     Weight loss 6/10/2020    Last Assessment & Plan:  - Unplanned weight loss accompanied by poor appetite and poor po intake since surgery - He has visit with surgeon tomorrow- advised to discuss his symptoms/weight loss - Recommended close follow up with PCP as well       Past Surgical History:   Procedure Laterality Date    APPENDECTOMY      BACK SURGERY      CATARACT EXTRACTION Bilateral     COLONOSCOPY  06/2011    diverticulosis    COLONOSCOPY  11/2015    diverticulosis    EGD  12/2019    distal esophageal adenocarcinoma    EGD  05/2021    status post esophagus resection, esophagogastric anastomosis appears normal   Dilated to 50 [place]    ESOPHAGECTOMY  05/2020    at [place] following chemoradiation for T3N0M0 adenocarcinoma    LUMBAR LAMINECTOMY      MASTOID SURGERY      NASAL SEPTUM SURGERY      VASECTOMY       Current Outpatient Medications   Medication Sig Dispense Refill    Cholecalciferol 25 MCG (1000 UT) capsule Take 1,000 Units by mouth 3 (three) times a day       Ferrous Sulfate (FEOSOL PO) Take 1 tablet by mouth daily      finasteride (PROSCAR) 5 mg tablet TAKE 1 TABLET BY MOUTH EVERY DAY 90 tablet 3    losartan (COZAAR) 100 MG tablet TAKE 1 TABLET BY MOUTH DAILY 90 tablet 3    metFORMIN (GLUCOPHAGE) 500 mg tablet Take 1 tablet (500 mg total) by mouth 2 (two) times a day with meals 180 tablet 3    nirmatrelvir & ritonavir (Paxlovid) tablet therapy pack Take 3 tablets by mouth 2 (two) times a day for 5 days Take 2 nirmatrelvir tablets + 1 ritonavir tablet together per dose 30 tablet 0    omeprazole (PriLOSEC) 40 MG capsule Take 1 capsule (40 mg total) by mouth 2 (two) times a day 180 capsule 3    simvastatin (ZOCOR) 10 mg tablet TAKE 1 TABLET BY MOUTH FOUR TIMES WEEKLY 48 tablet 3    tamsulosin (FLOMAX) 0 4 mg TAKE 1 CAPSULE BY MOUTH EVERY DAY WITH DINNER 90 capsule 3     No current facility-administered medications for this visit  No Known Allergies    Review of Systems   Constitutional: Negative for chills, fatigue and fever  HENT: Negative for congestion, rhinorrhea and sore throat  Eyes: Negative  Respiratory: Negative for cough, chest tightness and shortness of breath  Cardiovascular: Negative  Gastrointestinal: Negative for abdominal pain, diarrhea, nausea and vomiting  Endocrine: Negative  Genitourinary: Negative  Musculoskeletal: Negative for myalgias  Skin: Negative  Allergic/Immunologic: Negative  Neurological: Negative for headaches  Hematological: Negative  Psychiatric/Behavioral: Negative  Objective:    Vitals:    07/26/22 1243   Weight: 64 5 kg (142 lb 4 8 oz)   Height: 5' 9" (1 753 m)       Physical Exam  Vitals reviewed: limited due to [employer] exam    Constitutional:       General: He is not in acute distress  Appearance: Normal appearance  He is not ill-appearing  Neurological:      Mental Status: He is alert  VIRTUAL VISIT DISCLAIMER    Francy Lei verbally agrees to participate in [employer]  Pt is aware that [employer] could be limited without vital signs or the ability to perform a full hands-on physical Mary Callejas understands he or the provider may request at any time to terminate the video visit and request the patient to seek care or treatment in person

## 2022-07-26 NOTE — PATIENT INSTRUCTIONS
Problem List Items Addressed This Visit          Other    Hyperlipidemia     Patient's most recent lipid panel was completed in 2021 but was normal at that time  Patient is currently managed on simvastatin 10 mg daily  Patient was advised that simvastatin must be held while taking Paxlovid  Patient was advised that simvastatin can be restarted after finishing Paxlovid  COVID-19 virus infection - Primary     Paxlovid was ordered to be taken as directed over the next 5 days  Since patient is currently asymptomatic I will use today as the start date for his quarantine  Patient was advised to quarantine through 07/30/2022 and that if he remains afebrile for 24 hours without the use of antipyretics starting on 07/30/2022 he may return from isolation on 07/31/2022 and begin to mask for 5 days when in public and when around others  Patient is aware to hold simvastatin while taking Paxlovid  Relevant Medications    nirmatrelvir & ritonavir (Paxlovid) tablet therapy pack           [address]    Your healthcare provider and/or public health staff have evaluated you and have determined that you do not need to remain in the hospital at this time  At this time you can be isolated at home where you will be monitored by staff from your local or state health department  You should carefully follow the prevention and isolation steps below until a healthcare provider or local or state health department says that you can return to your normal activities  Stay home except to get medical care    People who are mildly ill with COVID-19 are able to isolate at home during their illness  You should restrict activities outside your home, except for getting medical care  Do not go to work, school, or public areas  Avoid using public transportation, ride-sharing, or taxis      Separate yourself from other people and animals in your home    People: As much as possible, you should stay in a specific room and away from other people in your home  Also, you should use a separate bathroom, if available  Animals: You should restrict contact with pets and other animals while you are sick with COVID-19, just like you would around other people  Although there have not been reports of pets or other animals becoming sick with COVID-19, it is still recommended that people sick with COVID-19 limit contact with animals until more information is known about the virus  When possible, have another member of your household care for your animals while you are sick  If you are sick with COVID-19, avoid contact with your pet, including petting, snuggling, being kissed or licked, and sharing food  If you must care for your pet or be around animals while you are sick, wash your hands before and after you interact with pets and wear a facemask  See COVID-19 and Animals for more information  Call ahead before visiting your doctor    If you have a medical appointment, call the healthcare provider and tell them that you have or may have COVID-19  This will help the healthcare providers office take steps to keep other people from getting infected or exposed  Wear a facemask    You should wear a facemask when you are around other people (e g , sharing a room or vehicle) or pets and before you enter a healthcare providers office  If you are not able to wear a facemask (for example, because it causes trouble breathing), then people who live with you should not stay in the same room with you, or they should wear a facemask if they enter your room  Cover your coughs and sneezes    Cover your mouth and nose with a tissue when you cough or sneeze  Throw used tissues in a lined trash can  Immediately wash your hands with soap and water for at least 20 seconds or, if soap and water are not available, clean your hands with an alcohol-based hand  that contains at least 60% alcohol      Clean your hands often    Wash your hands often with soap and water for at least 20 seconds, especially after blowing your nose, coughing, or sneezing; going to the bathroom; and before eating or preparing food  If soap and water are not readily available, use an alcohol-based hand  with at least 60% alcohol, covering all surfaces of your hands and rubbing them together until they feel dry  Soap and water are the best option if hands are visibly dirty  Avoid touching your eyes, nose, and mouth with unwashed hands  Avoid sharing personal household items    You should not share dishes, drinking glasses, cups, eating utensils, towels, or bedding with other people or pets in your home  After using these items, they should be washed thoroughly with soap and water  Clean all high-touch surfaces everyday    High touch surfaces include counters, tabletops, doorknobs, bathroom fixtures, toilets, phones, keyboards, tablets, and bedside tables  Also, clean any surfaces that may have blood, stool, or body fluids on them  Use a household cleaning spray or wipe, according to the label instructions  Labels contain instructions for safe and effective use of the cleaning product including precautions you should take when applying the product, such as wearing gloves and making sure you have good ventilation during use of the product  Monitor your symptoms    Seek prompt medical attention if your illness is worsening (e g , difficulty breathing)  Before seeking care, call your healthcare provider and tell them that you have, or are being evaluated for, COVID-19  Put on a facemask before you enter the facility  These steps will help the healthcare providers office to keep other people in the office or waiting room from getting infected or exposed  Ask your healthcare provider to call the local or state health department   Persons who are placed under active monitoring or facilitated self-monitoring should follow instructions provided by their local health department or occupational health professionals, as appropriate  If you have a medical emergency and need to call 911, notify the dispatch personnel that you have, or are being evaluated for COVID-19  If possible, put on a facemask before emergency medical services arrive  Discontinuing home isolation    Patients with confirmed COVID-19 should remain under home isolation precautions until the following conditions are met: They have had no fever for at least 24 hours (that is one full day of no fever without the use medicine that reduces fevers)  AND  other symptoms have improved (for example, when their cough or shortness of breath have improved)  AND  If had mild or moderate illness, at least 10 days have passed since their symptoms first appeared or if severe illness (needed oxygen) or immunosuppressed, at least 20 days have passed since symptoms first appeared  Patients with confirmed COVID-19 should also notify close contacts (including their workplace) and ask that they self-quarantine  Currently, close contact is defined as being within 6 feet for 15 minutes or more from the period 24 hours starting 48 hours before symptom onset to the time at which the patient went into isolation  Close contacts of patients diagnosed with COVID-19 should be instructed by the patient to self-quarantine for 14 days from the last time of their last contact with the patient  Source: Johanna hammer     PAXLOVID is an investigational medicine used to treat mild-to-moderate COVID-19 in adults and children [15years of age and older weighing at least 80 pounds (36 kg)] with positive results of direct SARS-CoV-2 viral testing, and who are at high risk for progression to severe COVID-19, including hospitalization or death  PAXLOVID is investigational because it is still being studied   There is limited information about the safety and effectiveness of using PAXLOVID to treat people with mild-to-moderate COVID-19  The FDA has authorized the emergency use of PAXLOVID for the treatment of mild-tomoderate COVID-19 in adults and children [15years of age and older weighing at least 80 pounds (36 kg)] with a positive test for the virus that causes COVID-19, and who are at high risk for progression to severe COVID-19, including hospitalization or death, under an EUA  What should I tell my healthcare provider before I take PAXLOVID? Tell your healthcare provider if you:  Have any allergies  Have liver or kidney disease  Are pregnant or plan to become pregnant  Are breastfeeding a child  ave any serious illnesses    Tell your healthcare provider about all the medicines you take, including prescription and over-the-counter medicines, vitamins, and herbal supplements  Some medicines may interact with PAXLOVID and may cause serious side effects  Keep a list of your medicines to show your healthcare provider and pharmacist when you get a new medicine  You can ask your healthcare provider or pharmacist for a list of medicines that interact with PAXLOVID  Do not start taking a new medicine without telling your healthcare provider  Your healthcare provider can tell you if it is safe to take PAXLOVID with other medicines  Tell your healthcare provider if you are taking combined hormonal contraceptive  PAXLOVID may affect how your birth control pills work  Females who are able to become pregnant should use another effective alternative form of contraception or an additional barrier method of contraception  Talk to your healthcare provider if you have any questions about contraceptive methods that might be right for you  How do I take PAXLOVID? PAXLOVID consists of 2 medicines: nirmatrelvir and ritonavir  Take 2 pink tablets of nirmatrelvir with 1 white tablet of ritonavir by mouth 2 times each day (in the morning and in the evening) for 5 days   For each dose, take all 3 tablets at the same time  If you have kidney disease, talk to your healthcare provider  You may need a different dose  Swallow the tablets whole  Do not chew, break, or crush the tablets  Take PAXLOVID with or without food  Do not stop taking PAXLOVID without talking to your healthcare provider, even if you feel better  If you miss a dose of PAXLOVID within 8 hours of the time it is usually taken, take it as soon as you remember  If you miss a dose by more than 8 hours, skip the missed dose and take the next dose at your regular time  Do not take 2 doses of PAXLOVID at the same time  If you take too much PAXLOVID, call your healthcare provider or go to the nearest hospital emergency room right away  If you are taking a ritonavir- or cobicistat-containing medicine to treat hepatitis C or Human Immunodeficiency Virus (HIV), you should continue to take your medicine as prescribed by your healthcare provider  Talk to your healthcare provider if you do not feel better or if you feel worse after 5 days  Who should generally not take PAXLOVID? Do not take PAXLOVID if:  You are allergic to nirmatrelvir, ritonavir, or any of the ingredients in PAXLOVID  You are taking any of the following medicines:  Alfuzosin  Pethidine, piroxicam, propoxyphene  Ranolazine  Amiodarone, dronedarone, flecainide, propafenone, quinidine  Colchicine  Lurasidone, pimozide, clozapine  Dihydroergotamine, ergotamine, methylergonovine  Lovastatin, simvastatin  Sildenafil (Revatio®) for pulmonary arterial hypertension (PAH)  Triazolam, oral midazolam  Apalutamide  Carbamazepine, phenobarbital, phenytoin  Rifampin  St  Bos Wort (hypericum perforatum)    What are the important possible side effects of PAXLOVID? Possible side effects of PAXLOVID are:  Liver Problems   Tell your healthcare provider right away if you have any of these signs and symptoms of liver problems: loss of appetite, yellowing of your skin and the whites of eyes (jaundice), dark-colored urine, pale colored stools and itchy skin, stomach area (abdominal) pain  Resistance to HIV Medicines  If you have untreated HIV infection, PAXLOVID may lead to some HIV medicines not working as well in the future  Other possible side effects include: altered sense of taste, diarrhea, high blood pressure, or muscle aches    These are not all the possible side effects of PAXLOVID  Not many people have taken PAXLOVID  Serious and unexpected side effects may happen  Sarahi Erwin is still being studied, so it is possible that all of the risks are not known at this time  What other treatment choices are there? Like Beto Collier may allow for the emergency use of other medicines to treat people with COVID-19  Go to https://[employer]/ for information on the emergency use of other medicines that are authorized by FDA to treat people with COVID-19  Your healthcare provider may talk with you about clinical trials for which you may be eligible  It is your choice to be treated or not to be treated with PAXLOVID  Should you decide not to receive it or for your child not to receive it, it will not change your standard medical care  What if I am pregnant or breastfeeding? There is no experience treating pregnant women or breastfeeding mothers with PAXLOVID  For a mother and unborn baby, the benefit of taking PAXLOVID may be greater than the risk from the treatment  If you are pregnant, discuss your options and specific situation with your healthcare provider  It is recommended that you use effective barrier contraception or do not have sexual activity while taking PAXLOVID  If you are breastfeeding, discuss your options and specific situation with your healthcare provider  How do I report side effects with PAXLOVID?     Contact your healthcare provider if you have any side effects that bother you or do not go away  Report side effects to FDA MedWatch at www fda gov/medwatch or call 3-643-NFS2358 or you can report side effects to [facility]  at the contact information provided below  Website Fax number Telephone number   [employer] [phone]     How should I store Pooja Hoop? Store PAXLOVID tablets at room temperature between 68°F to 77°F (20°C to 25°C)    Full fact sheet for patients, parents, and caregivers can be found at: http://www [employer]/

## 2022-07-26 NOTE — TELEPHONE ENCOUNTER
CB, Pharmacy states that pt should be holding the Flomax as well for the 5 days while pt is on the Covid meds, Do ypu want to d/c this?

## 2022-07-26 NOTE — PROGRESS NOTES
COVID-19 Outpatient Progress Note    Assessment/Plan:    Problem List Items Addressed This Visit    None        COVID-19 Plan   Encounter provider SONDRA Hernandez    Provider located at [address] [place] [address]  [address] [address] [id number] [phone]    Recent Visits  No visits were found meeting these conditions  Showing recent visits within past 7 days and meeting all other requirements  Today's Visits  Date Type Provider Dept   07/26/22 Telemedicine Mikhail Das[facility]   Showing today's visits and meeting all other requirements  Future Appointments  No visits were found meeting these conditions  Showing future appointments within next 150 days and meeting all other requirements     Mario Wagner acknowledged consent and understanding of privacy and security of the telemedicine visit  I informed the patient that I have reviewed his record in Epic and presented the opportunity for him to ask any questions regarding the visit today  The patient agreed to participate      *** MUST SELECT TYPE OF VISIT AND DOCUMENT HPI ***        *** VACCINATION STATUS REQUIRED ***    Lab Results   Component Value Date    SARSCOV2 Negative 05/17/2022     Past Medical History:   Diagnosis Date    Anemia     Castellanos's esophagus without dysplasia 2006    BPH with obstruction/lower urinary tract symptoms     Chronic GERD 12/27/2017    Chronic pain disorder     lumbo sacral, pt had surg and pain is improved    Colon polyp 2006    Cough     Diabetes mellitus ([address] )     Diverticulosis     Elevated PSA     Erectile dysfunction     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    Frequency of micturition     GERD (gastroesophageal reflux disease)     Hiatal hernia     History of Helicobacter pylori infection 2006    Hyperlipidemia     Hypertension     Paroxysmal atrial fibrillation ([address] ) 6/10/2020    Last Assessment & Plan:  - Afib RVR post op day #6 at [facility] s/p thoracoscopy/lympadenectomy rate rhythm control with diltiazem and amiodarone  No AC started  - Amio washout completed, 2 week Zio patch monitor to r/o afib recommended, this was explained in detail with pt and his wife  - Educated on the S/S of atrial fibrillation, TIA, CVA and he is instructed to call for any heart racing or     Tobacco use disorder 12/22/2010    Last Assessment & Plan:  Pt has been smoking less than 3 pp week not interested in quitting     Weight loss 6/10/2020    Last Assessment & Plan:  - Unplanned weight loss accompanied by poor appetite and poor po intake since surgery - He has visit with surgeon tomorrow- advised to discuss his symptoms/weight loss - Recommended close follow up with PCP as well       Past Surgical History:   Procedure Laterality Date    APPENDECTOMY      BACK SURGERY      CATARACT EXTRACTION Bilateral     COLONOSCOPY  06/2011    diverticulosis    COLONOSCOPY  11/2015    diverticulosis    EGD  12/2019    distal esophageal adenocarcinoma    EGD  05/2021    status post esophagus resection, esophagogastric anastomosis appears normal   Dilated to 50 Japanese    ESOPH[facility]CTOMY  05/2020    [facility] following chemoradiation for T3N0M0 adenocarcinoma    LUMBAR LAMINECTOMY      MASTOID SURGERY      NASAL SEPTUM SURGERY      VASECTOMY       Current Outpatient Medications   Medication Sig Dispense Refill    Cholecalciferol 25 MCG (1000 UT) capsule Take 1,000 Units by mouth 3 (three) times a day       Ferrous Sulfate (FEOSOL PO) Take 1 tablet by mouth daily      finasteride (PROSCAR) 5 mg tablet TAKE 1 TABLET BY MOUTH EVERY DAY 90 tablet 3    losartan (COZAAR) 100 MG tablet TAKE 1 TABLET BY MOUTH DAILY 90 tablet 3    metFORMIN (GLUCOPHAGE) 500 mg tablet Take 1 tablet (500 mg total) by mouth 2 (two) times a day with meals 180 tablet 3    omeprazole (PriLOSEC) 40 MG capsule Take 1 capsule (40 mg total) by mouth 2 (two) times a day 180 capsule 3    simvastatin (ZOCOR) 10 mg tablet TAKE 1 TABLET BY MOUTH FOUR TIMES WEEKLY 48 tablet 3    tamsulosin (FLOMAX) 0 4 mg TAKE 1 CAPSULE BY MOUTH EVERY DAY WITH DINNER 90 capsule 3     No current facility-administered medications for this visit  No Known Allergies    Review of Systems   Constitutional: Negative for appetite change, chills, diaphoresis, fatigue and fever  HENT: Positive for sore throat  Negative for congestion, ear pain, facial swelling, postnasal drip, rhinorrhea, sinus pressure, sinus pain, trouble swallowing and voice change  Respiratory: Positive for cough  Negative for chest tightness, shortness of breath and wheezing  Cardiovascular: Negative for chest pain  Gastrointestinal: Negative for abdominal pain, diarrhea, nausea and vomiting  Musculoskeletal: Negative for myalgias  Neurological: Negative for headaches  Objective:    Vitals:    07/26/22 1243   Weight: 64 5 kg (142 lb 4 8 oz)   Height: 5' 9" (1 753 m)       Physical Exam    VIRTUAL VISIT DISCLAIMER    Lawrence Gomez verbally agrees to participate in [facility]  Pt is aware that [facility] could be limited without vital signs or the ability to perform a full hands-on physical Terry De La Rosa understands he or the provider may request at any time to terminate the video visit and request the patient to seek care or treatment in person

## 2022-08-24 ENCOUNTER — APPOINTMENT (OUTPATIENT)
Dept: LAB | Facility: MEDICAL CENTER | Age: 87
End: 2022-08-24
Payer: COMMERCIAL

## 2022-08-24 DIAGNOSIS — G47.33 OBSTRUCTIVE SLEEP APNEA: ICD-10-CM

## 2022-08-24 DIAGNOSIS — E53.8 VITAMIN B12 DEFICIENCY: ICD-10-CM

## 2022-08-24 DIAGNOSIS — E78.00 PURE HYPERCHOLESTEROLEMIA: ICD-10-CM

## 2022-08-24 DIAGNOSIS — I10 ESSENTIAL HYPERTENSION: ICD-10-CM

## 2022-08-24 DIAGNOSIS — N13.8 BPH WITH URINARY OBSTRUCTION: ICD-10-CM

## 2022-08-24 DIAGNOSIS — N40.1 BPH WITH URINARY OBSTRUCTION: ICD-10-CM

## 2022-08-24 DIAGNOSIS — E11.9 TYPE 2 DIABETES MELLITUS WITHOUT COMPLICATION, WITHOUT LONG-TERM CURRENT USE OF INSULIN (HCC): ICD-10-CM

## 2022-08-24 DIAGNOSIS — C15.5 PRIMARY ADENOCARCINOMA OF DISTAL THIRD OF ESOPHAGUS (HCC): ICD-10-CM

## 2022-08-24 LAB
ALBUMIN SERPL BCP-MCNC: 3.5 G/DL (ref 3.5–5)
ALP SERPL-CCNC: 78 U/L (ref 46–116)
ALT SERPL W P-5'-P-CCNC: 19 U/L (ref 12–78)
ANION GAP SERPL CALCULATED.3IONS-SCNC: 5 MMOL/L (ref 4–13)
AST SERPL W P-5'-P-CCNC: 17 U/L (ref 5–45)
BASOPHILS # BLD AUTO: 0.03 THOUSANDS/ΜL (ref 0–0.1)
BASOPHILS NFR BLD AUTO: 1 % (ref 0–1)
BILIRUB SERPL-MCNC: 0.59 MG/DL (ref 0.2–1)
BUN SERPL-MCNC: 28 MG/DL (ref 5–25)
CALCIUM SERPL-MCNC: 9.3 MG/DL (ref 8.3–10.1)
CHLORIDE SERPL-SCNC: 110 MMOL/L (ref 96–108)
CHOLEST SERPL-MCNC: 148 MG/DL
CO2 SERPL-SCNC: 23 MMOL/L (ref 21–32)
CREAT SERPL-MCNC: 1.63 MG/DL (ref 0.6–1.3)
EOSINOPHIL # BLD AUTO: 0.11 THOUSAND/ΜL (ref 0–0.61)
EOSINOPHIL NFR BLD AUTO: 2 % (ref 0–6)
ERYTHROCYTE [DISTWIDTH] IN BLOOD BY AUTOMATED COUNT: 14.1 % (ref 11.6–15.1)
GFR SERPL CREATININE-BSD FRML MDRD: 37 ML/MIN/1.73SQ M
GLUCOSE P FAST SERPL-MCNC: 95 MG/DL (ref 65–99)
HCT VFR BLD AUTO: 35.8 % (ref 36.5–49.3)
HDLC SERPL-MCNC: 69 MG/DL
HGB BLD-MCNC: 11 G/DL (ref 12–17)
IMM GRANULOCYTES # BLD AUTO: 0.03 THOUSAND/UL (ref 0–0.2)
IMM GRANULOCYTES NFR BLD AUTO: 1 % (ref 0–2)
LDLC SERPL CALC-MCNC: 60 MG/DL (ref 0–100)
LYMPHOCYTES # BLD AUTO: 0.78 THOUSANDS/ΜL (ref 0.6–4.47)
LYMPHOCYTES NFR BLD AUTO: 13 % (ref 14–44)
MCH RBC QN AUTO: 29.6 PG (ref 26.8–34.3)
MCHC RBC AUTO-ENTMCNC: 30.7 G/DL (ref 31.4–37.4)
MCV RBC AUTO: 97 FL (ref 82–98)
MONOCYTES # BLD AUTO: 0.67 THOUSAND/ΜL (ref 0.17–1.22)
MONOCYTES NFR BLD AUTO: 12 % (ref 4–12)
NEUTROPHILS # BLD AUTO: 4.21 THOUSANDS/ΜL (ref 1.85–7.62)
NEUTS SEG NFR BLD AUTO: 71 % (ref 43–75)
NONHDLC SERPL-MCNC: 79 MG/DL
NRBC BLD AUTO-RTO: 0 /100 WBCS
PLATELET # BLD AUTO: 197 THOUSANDS/UL (ref 149–390)
PMV BLD AUTO: 12.8 FL (ref 8.9–12.7)
POTASSIUM SERPL-SCNC: 4.8 MMOL/L (ref 3.5–5.3)
PROT SERPL-MCNC: 6.8 G/DL (ref 6.4–8.4)
RBC # BLD AUTO: 3.71 MILLION/UL (ref 3.88–5.62)
SODIUM SERPL-SCNC: 138 MMOL/L (ref 135–147)
T4 SERPL-MCNC: 7.9 UG/DL (ref 4.7–13.3)
TRIGL SERPL-MCNC: 95 MG/DL
WBC # BLD AUTO: 5.83 THOUSAND/UL (ref 4.31–10.16)

## 2022-08-24 PROCEDURE — 80061 LIPID PANEL: CPT

## 2022-08-24 PROCEDURE — 36415 COLL VENOUS BLD VENIPUNCTURE: CPT

## 2022-08-24 PROCEDURE — 80053 COMPREHEN METABOLIC PANEL: CPT

## 2022-08-24 PROCEDURE — 83918 ORGANIC ACIDS TOTAL QUANT: CPT

## 2022-08-24 PROCEDURE — 85025 COMPLETE CBC W/AUTO DIFF WBC: CPT

## 2022-08-24 PROCEDURE — 84436 ASSAY OF TOTAL THYROXINE: CPT

## 2022-08-28 LAB — METHYLMALONATE SERPL-SCNC: 448 NMOL/L (ref 0–378)

## 2022-09-01 ENCOUNTER — RA CDI HCC (OUTPATIENT)
Dept: OTHER | Facility: HOSPITAL | Age: 87
End: 2022-09-01

## 2022-09-01 NOTE — PROGRESS NOTES
[facility]  coding opportunities       Chart reviewed, no opportunity found:   [address]        Patients Insurance     Medicare Insurance: [employer]

## 2022-09-02 ENCOUNTER — APPOINTMENT (OUTPATIENT)
Dept: LAB | Facility: MEDICAL CENTER | Age: 87
End: 2022-09-02
Payer: COMMERCIAL

## 2022-09-02 DIAGNOSIS — N40.1 BPH WITH URINARY OBSTRUCTION: ICD-10-CM

## 2022-09-02 DIAGNOSIS — E53.8 VITAMIN B12 DEFICIENCY: ICD-10-CM

## 2022-09-02 DIAGNOSIS — I10 ESSENTIAL HYPERTENSION: ICD-10-CM

## 2022-09-02 DIAGNOSIS — E78.00 PURE HYPERCHOLESTEROLEMIA: ICD-10-CM

## 2022-09-02 DIAGNOSIS — E11.9 TYPE 2 DIABETES MELLITUS WITHOUT COMPLICATION, WITHOUT LONG-TERM CURRENT USE OF INSULIN (HCC): ICD-10-CM

## 2022-09-02 DIAGNOSIS — N13.8 BPH WITH URINARY OBSTRUCTION: ICD-10-CM

## 2022-09-02 DIAGNOSIS — C15.9 PRIMARY ADENOCARCINOMA OF ESOPHAGUS (HCC): ICD-10-CM

## 2022-09-02 DIAGNOSIS — G47.33 OBSTRUCTIVE SLEEP APNEA: ICD-10-CM

## 2022-09-02 LAB
TSH SERPL DL<=0.05 MIU/L-ACNC: 3.19 UIU/ML (ref 0.45–4.5)
VIT B12 SERPL-MCNC: 1624 PG/ML (ref 100–900)

## 2022-09-02 PROCEDURE — 84443 ASSAY THYROID STIM HORMONE: CPT

## 2022-09-02 PROCEDURE — 36415 COLL VENOUS BLD VENIPUNCTURE: CPT

## 2022-09-02 PROCEDURE — 82607 VITAMIN B-12: CPT

## 2022-09-06 PROBLEM — U07.1 COVID-19 VIRUS INFECTION: Status: RESOLVED | Noted: 2022-07-26 | Resolved: 2022-09-06

## 2022-09-07 ENCOUNTER — OFFICE VISIT (OUTPATIENT)
Dept: FAMILY MEDICINE CLINIC | Facility: CLINIC | Age: 87
End: 2022-09-07
Payer: COMMERCIAL

## 2022-09-07 VITALS
HEIGHT: 69 IN | WEIGHT: 135 LBS | DIASTOLIC BLOOD PRESSURE: 60 MMHG | SYSTOLIC BLOOD PRESSURE: 104 MMHG | HEART RATE: 68 BPM | BODY MASS INDEX: 19.99 KG/M2

## 2022-09-07 DIAGNOSIS — E11.9 TYPE 2 DIABETES MELLITUS WITHOUT COMPLICATION, WITHOUT LONG-TERM CURRENT USE OF INSULIN (HCC): ICD-10-CM

## 2022-09-07 DIAGNOSIS — N13.8 BPH WITH URINARY OBSTRUCTION: ICD-10-CM

## 2022-09-07 DIAGNOSIS — N40.1 BPH WITH URINARY OBSTRUCTION: ICD-10-CM

## 2022-09-07 DIAGNOSIS — I10 ESSENTIAL HYPERTENSION: ICD-10-CM

## 2022-09-07 DIAGNOSIS — D64.9 ANEMIA, UNSPECIFIED TYPE: ICD-10-CM

## 2022-09-07 DIAGNOSIS — E78.00 PURE HYPERCHOLESTEROLEMIA: ICD-10-CM

## 2022-09-07 DIAGNOSIS — D50.8 IRON DEFICIENCY ANEMIA SECONDARY TO INADEQUATE DIETARY IRON INTAKE: ICD-10-CM

## 2022-09-07 DIAGNOSIS — R63.4 WEIGHT LOSS: Primary | ICD-10-CM

## 2022-09-07 DIAGNOSIS — N18.32 STAGE 3B CHRONIC KIDNEY DISEASE (HCC): ICD-10-CM

## 2022-09-07 DIAGNOSIS — E53.8 VITAMIN B12 DEFICIENCY: ICD-10-CM

## 2022-09-07 DIAGNOSIS — C15.5 PRIMARY ADENOCARCINOMA OF DISTAL THIRD OF ESOPHAGUS (HCC): ICD-10-CM

## 2022-09-07 LAB — SL AMB POCT HEMOGLOBIN AIC: 6.1 (ref ?–6.5)

## 2022-09-07 PROCEDURE — 96372 THER/PROPH/DIAG INJ SC/IM: CPT | Performed by: FAMILY MEDICINE

## 2022-09-07 PROCEDURE — 99215 OFFICE O/P EST HI 40 MIN: CPT | Performed by: FAMILY MEDICINE

## 2022-09-07 PROCEDURE — 83036 HEMOGLOBIN GLYCOSYLATED A1C: CPT | Performed by: FAMILY MEDICINE

## 2022-09-07 RX ORDER — UBIDECARENONE 75 MG
500 CAPSULE ORAL DAILY
COMMUNITY
End: 2022-09-27

## 2022-09-07 RX ORDER — CYANOCOBALAMIN 1000 UG/ML
1000 INJECTION, SOLUTION INTRAMUSCULAR; SUBCUTANEOUS
Status: SHIPPED | OUTPATIENT
Start: 2022-09-07

## 2022-09-07 RX ORDER — CYANOCOBALAMIN 1000 UG/ML
1000 INJECTION, SOLUTION INTRAMUSCULAR; SUBCUTANEOUS ONCE
Qty: 1 ML | Refills: 0 | Status: CANCELLED | OUTPATIENT
Start: 2022-09-07 | End: 2022-09-07

## 2022-09-07 RX ADMIN — CYANOCOBALAMIN 1000 MCG: 1000 INJECTION, SOLUTION INTRAMUSCULAR; SUBCUTANEOUS at 14:45

## 2022-09-07 NOTE — PROGRESS NOTES
Assessment and Plan:    Pleasant and youthful 53-year-old male presents today with his wife to review chronic medical conditions and to review recent bloodwork  He is scheduled for a CT at [facility] this month for f/u of adenoCa of esophagus  He would like to pursue future follow-up care in the [facility] as the trips to [place] are challenging  He is a patient of Dr Brianna Green and plans to see him  Pt and his wife returned on Aug 16th from second trip to [facility] this summer  He also had Covid this summer - he was asymptomatic  Admits that with travel and jet lag, he is quite tired  His appetite remains the same - eats small amounts and gets full fast  Notes weight loss - eating less; walks daily; works out  Feels that his exercise tolerance is good and unchanged  He attributes his weight loss to the travel and interruption of his routine  He continues to take iron pills as well as methylcobalamin 500 mg daily  1  Weight loss - etiology unclear; could be from his disrupted schedule and travel this summer  Await f/u CT C/A/P to evaluate for recurrence  Hgb has dropped 1 point which is also somewhat concerning  2  Anemia - microcytic  Hemoglobin has decreased from 12 to 11 with decreased red blood count and MCHC  MPH is elevated  He has a hx of both iron deficiency and B12 deficiency, likely due to decreased absorption s/p surgery for esophageal cancer  He continues to  take his iron supplements  Change sublingual methylcobalamin supplementation to IM, see below  I have not ordered repeat iron studies until CT is performed  3  Diabetes mellitus - hemoglobin A1c today is 6 1  Creatinine is elevated at 1 6 with GFR 37  Decrease metformin to 500mg daily due to decreased renal function  Might even consider stopping  Diabetic foot exam today is wnl     4  Chronic kidney disease - creatinine is 1 6 and GFR is 37  In April his creatinine was 1 09 and GFR 62   Questionable etiology for decrease in renal function  May be multifactorial    Decrease metformin today and consider stopping  Consider stopping tamsulosin and/or finasteride  Continue on losartan for renal protection and prophylaxis  Increase H2O input - his wife states he does not drink enough  Pt is supposed to be getting a CT on September 14th with contrast  He may need IV hydration prior  Will reach out to Dr Ulysses Oglesby and refer for consultation and input  5  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 but states "I'm not sure why I am on them " Would consider stopping, but await CT pelvis to assess state of kidneys, bladder, prostate  Would not want to put him at risk for obstruction at this point  6  Hypertension - BP wnl  Cont  losartan    7  Hyperlipidemia - stable on simvastatin; continue current regimen  8  Adeno Ca of distal esophagus - CT C/A/P due September; patient will f/u with GI and Oncology    9  Iron deficiency anemia - he continues on his iron supplements  Consider repeating iron studies; may also be secondary to CKD    10  Vitamin B12 deficiency - patient has been taking sublingual methylcobalamin  Vitamin B12 level is within normal range in fact over 1000  However his methylmalonic acid is elevated  I am switching him over now to IM injections instead of sublingual     F/U 1 month with CBC and CMP prior to visit  45 minutes spent on chart review, lab review, and evaluation of this complicated case with greater than 50% of time spent on counseling and care coordination  Subjective:      Patient ID: Kalpana Lynn is a 82 y o  male  CC:    Chief Complaint   Patient presents with    Follow-up     Patient is here for a 4 month f/u re: chronic medical conditions  Patient would like his ears looked at for wax  Patient c/o intermittent dizziness, but resolves in seconds, ususally when changing positions   ak       HPI:    Pleasant and youthful 56-year-old male presents today with his wife to review chronic medical conditions and to review recent bloodwork  He is scheduled for a CT at [facility] this month for f/u of adenoCa of esophagus  He would like to pursue future follow-up care in the [facility] as the trips to [place] are challenging  He is a patient of Dr Jose Elias Borges and plans to see him  Pt and his wife returned on Aug 16th from second trip to [facility] this summer  H Also had Covid this summer - he was asymptomatic  Admits that with travel and jet lag, he is quite tired  His appetite remains the same - eats small amounts and gets full fast  Notes weight loss - eating less; walks daily; works out  Feels that his exercise tolerance is good and unchanged  He attributes his weight loss to the travel and interruption of his routine  He continues to take iron pills as well as methylcobalamin 500 mg daily  The following portions of the patient's history were reviewed and updated as appropriate: allergies, current medications, past family history, past medical history, past social history, past surgical history and problem list       Review of Systems   Constitutional: Positive for unexpected weight change  See HPI   HENT: Negative for congestion, ear pain, mouth sores, sinus pressure and trouble swallowing  Eyes: Negative for discharge, redness and itching  Respiratory: Negative for apnea, cough, chest tightness, shortness of breath, wheezing and stridor  Cardiovascular: Negative for chest pain, palpitations and leg swelling  Gastrointestinal: Negative for abdominal distention, abdominal pain, blood in stool, constipation, diarrhea, nausea and vomiting  See HPI   Endocrine: Negative for cold intolerance and heat intolerance  Genitourinary: Negative for difficulty urinating, dysuria, flank pain and urgency  Musculoskeletal: Negative for arthralgias and myalgias  Skin: Negative for rash     Neurological: Negative for dizziness, seizures, syncope, speech difficulty, weakness, light-headedness, numbness and headaches  Hematological: Negative for adenopathy  Psychiatric/Behavioral: Negative for agitation, behavioral problems, confusion and sleep disturbance  The patient is not nervous/anxious  Data to review:       Objective:    Vitals:    09/07/22 1040   BP: 104/60   Pulse: 68   Weight: 61 2 kg (135 lb)   Height: 5' 9" (1 753 m)        Physical Exam  Vitals and nursing note reviewed  Constitutional:       General: He is not in acute distress  Appearance: Normal appearance  He is normal weight  He is not ill-appearing  Comments: Appears thin but not cachectic  Well groomed  Answering questions appropriately  Eyes:      Conjunctiva/sclera: Conjunctivae normal    Neck:      Vascular: No carotid bruit  Cardiovascular:      Rate and Rhythm: Normal rate and regular rhythm  Pulses: no weak pulses          Dorsalis pedis pulses are 1+ on the right side and 1+ on the left side  Posterior tibial pulses are 1+ on the right side and 1+ on the left side  Heart sounds: No murmur heard  No friction rub  No gallop  Pulmonary:      Effort: Pulmonary effort is normal  No respiratory distress  Breath sounds: No stridor  No wheezing, rhonchi or rales  Musculoskeletal:      Cervical back: Normal range of motion  Right lower leg: No edema  Left lower leg: No edema  Feet:      Right foot:      Skin integrity: No ulcer, skin breakdown, erythema, warmth, callus or dry skin  Left foot:      Skin integrity: No ulcer, skin breakdown, erythema, warmth, callus or dry skin  Skin:     General: Skin is warm  Coloration: Skin is not jaundiced  Neurological:      General: No focal deficit present  Mental Status: He is alert  Psychiatric:         Mood and Affect: Mood normal          Behavior: Behavior normal          Thought Content:  Thought content normal          Judgment: Judgment normal       Comments: Affect is normal   Good eye contact  Patient's shoes and socks removed  Right Foot/Ankle   Right Foot Inspection  Skin Exam: skin normal and skin intact  No dry skin, no warmth, no callus, no erythema, no maceration, no abnormal color, no pre-ulcer, no ulcer and no callus  Toe Exam: ROM and strength within normal limits  No swelling, no tenderness, erythema and  no right toe deformity    Sensory   Proprioception: intact  Monofilament testing: intact    Vascular  Capillary refills: < 3 seconds  The right DP pulse is 1+  The right PT pulse is 1+  Left Foot/Ankle  Left Foot Inspection  Skin Exam: skin normal and skin intact  No dry skin, no warmth, no erythema, no maceration, normal color, no pre-ulcer, no ulcer and no callus  Toe Exam: ROM and strength within normal limits  No swelling, no tenderness, no erythema and no left toe deformity  Sensory   Monofilament testing: intact    Vascular  Capillary refills: < 3 seconds  The left DP pulse is 1+  The left PT pulse is 1+       Assign Risk Category  No deformity present  No loss of protective sensation  No weak pulses  Risk: 0

## 2022-09-07 NOTE — PATIENT INSTRUCTIONS
Start B12 injections once a month -start today  See Nephrologist - Dr Jean Pierre rose at [facility] in September  See me in 1 month and please do bloodwork before the visit - this is to check your kidneys

## 2022-09-08 ENCOUNTER — TELEPHONE (OUTPATIENT)
Dept: NEPHROLOGY | Facility: CLINIC | Age: 87
End: 2022-09-08

## 2022-09-08 ENCOUNTER — TELEPHONE (OUTPATIENT)
Dept: ADMINISTRATIVE | Facility: OTHER | Age: 87
End: 2022-09-08

## 2022-09-08 NOTE — TELEPHONE ENCOUNTER
----- Message from Liliane Oliver sent at 9/7/2022 11:31 AM EDT -----  Regarding: eye exam  09/07/22 11:32 AM    Hello, our patient Kg Gibbs has had Diabetic Eye Examcompleted/performed  Please assist in updating the patient chart by making an External outreach to Dr Vazquez Collier facility located in [facility]  The date of service is 2022      Thank you,  Val Osullivan Check  [facility]

## 2022-09-08 NOTE — TELEPHONE ENCOUNTER
New Patient Intake Form   Patient Details   Ovidio Ty     1935     [id number]     Insurance Information   Name of Mora Giron   Does the patient need an insurance referral? no   If patient has Pitney Olvin, please ask if they will be using their Pitney Olvin  Appointment Information   Who is calling to schedule? If not patient, what is callers name? Spouse   Referring Provider PCP Dr Nancy Spangler   Referring Provider Number [phone]   Reason for Appt (Diagnosis) Type 2 diabetes mellitus without complication, without long-term current use of     BPH with urinary obstruction [N40 1, N13 8]  Primary adenocarcinoma of distal third of esophagus ([facility] [address] ) [C15 5]  Weight loss [R63 4]  Anemia, unspecified type [D64 9]  Stage 3b chronic kidney disease ([facility] [address] ) [N18 32]   Is patient aware of why they are being referred? yes   Does Patient have labs done at [place] [address]? If not, where do they go?  yes   Has patient had labs / urine work done? List date of most recent lab / urine work  yes 08/24/22   Has patient had a BMP & CBC done in the past 2 years? If so, list the date Yes 08/24/22   Has patient been hospitalized recently? If yes, list name and location of hospital they were in  no   Has patient been seen by a Nephrologist before? If yes, list name, location and phone number no    Has patient been see by another Speciality before (ex  Neurology, urology, cardiology)? If yes, please list name, and speciality  Bro Ba   Has the patient had imaging done? If so, list the most recent date and type of imagineg Yes  CT- 02/15/2018   Does the patient has a stone analysis report if history of kidney stones? no    Appointment Details   Is there a referral on file?  yes    Appointment Date  01/04/22   Location [place]   Miscellaneous

## 2022-09-08 NOTE — LETTER
Diabetic Eye Exam Form    Date Requested: 22  Patient: Jennifer Bautista  Patient : 1935   Referring Provider: Edd Palacios MD    DIABETIC Eye Exam Date _______________________________    Type of Exam MUST be documented for Diabetic Eye Exams  Please CHECK ONE  Retinal Exam       Dilated Retinal Exam       OCT       Optomap-Iris Exam      Fundus Photography     Left Eye - Please check Retinopathy AND Type or No Retinopathy      Exam did show retinopathy    Exam did not show retinopathy         Mild     Proliferative           Moderate    Severe            None         Right Eye - Please check Retinopathy AND Type or No Retinopathy     Exam did show retinopathy    Exam did not show retinopathy         Mild     Proliferative        Moderate    Severe        None       Comments __________________________________________________________    Practice Providing Exam ______________________________________________    Exam Performed By (print name) _______________________________________      Provider Signature ___________________________________________________    These reports are needed for  compliance  Please fax this completed form and a copy of the Diabetic Eye Exam report to our office located at [place] [address] as soon as possible via [phone] rosemarie Mcclelland: Phone [phone]  We thank you for your assistance in treating our mutual patient

## 2022-09-08 NOTE — LETTER
Diabetic Eye Exam Form    Date Requested: 22  Patient: Devika Thompson  Patient : 1935   Referring Provider: Gregory Haas MD    DIABETIC Eye Exam Date _______________________________    Type of Exam MUST be documented for Diabetic Eye Exams  Please CHECK ONE  Retinal Exam       Dilated Retinal Exam       OCT       Optomap-Iris Exam      Fundus Photography     Left Eye - Please check Retinopathy AND Type or No Retinopathy      Exam did show retinopathy    Exam did not show retinopathy         Mild     Proliferative           Moderate    Severe            None         Right Eye - Please check Retinopathy AND Type or No Retinopathy     Exam did show retinopathy    Exam did not show retinopathy         Mild     Proliferative        Moderate    Severe        None       Comments DOS:     Practice Providing Exam ______________________________________________    Exam Performed By (print name) _______________________________________      Provider Signature ___________________________________________________    These reports are needed for  compliance  Please fax this completed form and a copy of the Diabetic Eye Exam report to our office located at [place] [address] as soon as possible via [phone] attention Jamin Martinez: Phone [phone]  We thank you for your assistance in treating our mutual patient  Statement Selected

## 2022-09-08 NOTE — TELEPHONE ENCOUNTER
Upon review of the In Basket request and the patient's chart, initial outreach has been made via fax, please see Contacts section for details       Thank you  Angel Raygoza

## 2022-09-09 ENCOUNTER — TELEPHONE (OUTPATIENT)
Dept: FAMILY MEDICINE CLINIC | Facility: CLINIC | Age: 87
End: 2022-09-09

## 2022-09-09 DIAGNOSIS — N17.9 AKI (ACUTE KIDNEY INJURY) (HCC): Primary | ICD-10-CM

## 2022-09-09 NOTE — TELEPHONE ENCOUNTER
Spoke with patient and his wife regarding recent discussion with Dr Marifer Alcala in regards to recent AMOL and upcoming CT with IV and oral contrast     ASSESS: AMOL, likely prerenal in origin    PLAN:    1  Pt to increase fluids +++  2  Stop Losartan for now  3  Check BP at home and notify me if SBP greater than 140   4  Repeat BMP on Monday, 9/12/22  Order placed in Epic; call patient with results  5  I will call his Surgical Oncologist, Dr Demian Melo at [facility], with BMP results, to update them on renal function and upcoming CT       is Patrick Sorto, [address] [facility] - [phone]

## 2022-09-12 ENCOUNTER — TELEPHONE (OUTPATIENT)
Dept: FAMILY MEDICINE CLINIC | Facility: CLINIC | Age: 87
End: 2022-09-12

## 2022-09-12 ENCOUNTER — APPOINTMENT (OUTPATIENT)
Dept: LAB | Facility: MEDICAL CENTER | Age: 87
End: 2022-09-12
Payer: COMMERCIAL

## 2022-09-12 DIAGNOSIS — D64.9 ANEMIA, UNSPECIFIED TYPE: ICD-10-CM

## 2022-09-12 DIAGNOSIS — N18.32 STAGE 3B CHRONIC KIDNEY DISEASE (HCC): ICD-10-CM

## 2022-09-12 DIAGNOSIS — N17.9 AKI (ACUTE KIDNEY INJURY) (HCC): ICD-10-CM

## 2022-09-12 LAB
ALBUMIN SERPL BCP-MCNC: 3.2 G/DL (ref 3.5–5)
ALP SERPL-CCNC: 72 U/L (ref 46–116)
ALT SERPL W P-5'-P-CCNC: 17 U/L (ref 12–78)
ANION GAP SERPL CALCULATED.3IONS-SCNC: 5 MMOL/L (ref 4–13)
AST SERPL W P-5'-P-CCNC: 11 U/L (ref 5–45)
BASOPHILS # BLD AUTO: 0.05 THOUSANDS/ΜL (ref 0–0.1)
BASOPHILS NFR BLD AUTO: 1 % (ref 0–1)
BILIRUB SERPL-MCNC: 0.5 MG/DL (ref 0.2–1)
BUN SERPL-MCNC: 18 MG/DL (ref 5–25)
CALCIUM ALBUM COR SERPL-MCNC: 9.5 MG/DL (ref 8.3–10.1)
CALCIUM SERPL-MCNC: 8.9 MG/DL (ref 8.3–10.1)
CHLORIDE SERPL-SCNC: 109 MMOL/L (ref 96–108)
CO2 SERPL-SCNC: 27 MMOL/L (ref 21–32)
CREAT SERPL-MCNC: 1.38 MG/DL (ref 0.6–1.3)
EOSINOPHIL # BLD AUTO: 0.16 THOUSAND/ΜL (ref 0–0.61)
EOSINOPHIL NFR BLD AUTO: 3 % (ref 0–6)
ERYTHROCYTE [DISTWIDTH] IN BLOOD BY AUTOMATED COUNT: 14.4 % (ref 11.6–15.1)
GFR SERPL CREATININE-BSD FRML MDRD: 46 ML/MIN/1.73SQ M
GLUCOSE P FAST SERPL-MCNC: 102 MG/DL (ref 65–99)
HCT VFR BLD AUTO: 33 % (ref 36.5–49.3)
HGB BLD-MCNC: 10.4 G/DL (ref 12–17)
IMM GRANULOCYTES # BLD AUTO: 0.02 THOUSAND/UL (ref 0–0.2)
IMM GRANULOCYTES NFR BLD AUTO: 0 % (ref 0–2)
LYMPHOCYTES # BLD AUTO: 0.97 THOUSANDS/ΜL (ref 0.6–4.47)
LYMPHOCYTES NFR BLD AUTO: 19 % (ref 14–44)
MCH RBC QN AUTO: 30.1 PG (ref 26.8–34.3)
MCHC RBC AUTO-ENTMCNC: 31.5 G/DL (ref 31.4–37.4)
MCV RBC AUTO: 95 FL (ref 82–98)
MONOCYTES # BLD AUTO: 0.57 THOUSAND/ΜL (ref 0.17–1.22)
MONOCYTES NFR BLD AUTO: 11 % (ref 4–12)
NEUTROPHILS # BLD AUTO: 3.45 THOUSANDS/ΜL (ref 1.85–7.62)
NEUTS SEG NFR BLD AUTO: 66 % (ref 43–75)
NRBC BLD AUTO-RTO: 0 /100 WBCS
PLATELET # BLD AUTO: 226 THOUSANDS/UL (ref 149–390)
PMV BLD AUTO: 12 FL (ref 8.9–12.7)
POTASSIUM SERPL-SCNC: 4.6 MMOL/L (ref 3.5–5.3)
PROT SERPL-MCNC: 6.9 G/DL (ref 6.4–8.4)
RBC # BLD AUTO: 3.46 MILLION/UL (ref 3.88–5.62)
SODIUM SERPL-SCNC: 141 MMOL/L (ref 135–147)
WBC # BLD AUTO: 5.22 THOUSAND/UL (ref 4.31–10.16)

## 2022-09-12 PROCEDURE — 36415 COLL VENOUS BLD VENIPUNCTURE: CPT

## 2022-09-12 PROCEDURE — 85025 COMPLETE CBC W/AUTO DIFF WBC: CPT

## 2022-09-12 PROCEDURE — 80053 COMPREHEN METABOLIC PANEL: CPT

## 2022-09-12 NOTE — TELEPHONE ENCOUNTER
Spoke with [facility] - to relay that pt slightly more anemic, and CMP pending  He is due for CT C/A/P with oral and IV contrast on Wednesday, and still awaiting renals studies as he had a AMOL, likely prerenal, evident on recent bloodwork  Charity Sanchez

## 2022-09-12 NOTE — TELEPHONE ENCOUNTER
Spole with Sangita AMARO at [facility] today  Made aware of recent BW  She will assess and consider scan without contrast, and will call patient to let him know  She is also aware of slight drop in Hgb and weight

## 2022-09-15 ENCOUNTER — TELEPHONE (OUTPATIENT)
Dept: FAMILY MEDICINE CLINIC | Facility: CLINIC | Age: 87
End: 2022-09-15

## 2022-09-15 DIAGNOSIS — N17.9 AKI (ACUTE KIDNEY INJURY) (HCC): Primary | ICD-10-CM

## 2022-09-15 NOTE — TELEPHONE ENCOUNTER
Call from [address] at [facility]  She reports that patient had CT C/A/P done at [facility]) and that he did get contrast, despite her request for non-contrast  She is calling to let me know so that I can follow up on his renal function  She reports that their Radiology reading is delayed and may take until next Friday for complete read  Called patient to review  He will need stat BMP to check on renal status, r/o any further injury to kidney  1  LMOM of Mr Brett Ivy to please go for BMP today or tomorrow, placed in chart  2   Asked Kylie Reynaga to call radiology as she is the ordering provider, to try to expedite results

## 2022-09-16 ENCOUNTER — APPOINTMENT (OUTPATIENT)
Dept: LAB | Facility: MEDICAL CENTER | Age: 87
End: 2022-09-16
Payer: COMMERCIAL

## 2022-09-16 DIAGNOSIS — N17.9 AKI (ACUTE KIDNEY INJURY) (HCC): ICD-10-CM

## 2022-09-16 LAB
ANION GAP SERPL CALCULATED.3IONS-SCNC: 5 MMOL/L (ref 4–13)
BUN SERPL-MCNC: 17 MG/DL (ref 5–25)
CALCIUM SERPL-MCNC: 9.2 MG/DL (ref 8.3–10.1)
CHLORIDE SERPL-SCNC: 107 MMOL/L (ref 96–108)
CO2 SERPL-SCNC: 28 MMOL/L (ref 21–32)
CREAT SERPL-MCNC: 1.47 MG/DL (ref 0.6–1.3)
GFR SERPL CREATININE-BSD FRML MDRD: 42 ML/MIN/1.73SQ M
GLUCOSE P FAST SERPL-MCNC: 108 MG/DL (ref 65–99)
POTASSIUM SERPL-SCNC: 5.3 MMOL/L (ref 3.5–5.3)
SODIUM SERPL-SCNC: 140 MMOL/L (ref 135–147)

## 2022-09-16 PROCEDURE — 36415 COLL VENOUS BLD VENIPUNCTURE: CPT

## 2022-09-16 PROCEDURE — 80048 BASIC METABOLIC PNL TOTAL CA: CPT

## 2022-09-19 NOTE — TELEPHONE ENCOUNTER
As a follow-up, a second attempt has been made for outreach via fax, please see Contacts section for details      Thank you  Kimberley Gaviria

## 2022-09-20 ENCOUNTER — TELEPHONE (OUTPATIENT)
Dept: FAMILY MEDICINE CLINIC | Facility: CLINIC | Age: 87
End: 2022-09-20

## 2022-09-20 NOTE — TELEPHONE ENCOUNTER
Patient spouse notified of results and recommendations  Patient spouse wanted to confirm if it was ok for pt to restart his losartan medication since he has completed his CT  He was advised to stop prior to getting the CT done  Please advise

## 2022-09-20 NOTE — TELEPHONE ENCOUNTER
----- Message from Abdoul Ramos MD sent at 9/20/2022 12:47 PM EDT -----  Please notify patient that his kidney function is stable  Keep drinking lots of water    Thank you,  CB

## 2022-09-20 NOTE — TELEPHONE ENCOUNTER
Please call back patient -    If his BP is WNL, I would hold off on losartan  I will try and get him a sooner appt with nephrology      Thank you,  CB

## 2022-09-21 ENCOUNTER — TELEPHONE (OUTPATIENT)
Dept: FAMILY MEDICINE CLINIC | Facility: CLINIC | Age: 87
End: 2022-09-21

## 2022-09-21 NOTE — TELEPHONE ENCOUNTER
----- Message from NEEMA GALEANO MD sent at 9/9/2022 11:18 AM EDT -----  Regarding: RE: Difficult case, please help  Hi  Thanks so much for your kind words  Glad you liked the lecture  As for the patient my thoughts are below, please review and let me know if I can be of further help  1  So looking at the lab work he looks like he has had an AMOL since his last draw in 2021 vs developed ckd  Based on the chart review likely reasons for AMOL appear to be pre-renal and possible some failure to autoregulate in the presence of the losartan  (Not sure if he is taking any nsaids)  His BP at your visit seemed low  Even his prior UA showed a concentrated urine plus some ketones (which could be from starvation vs dehydration)  I would recommend having him stop his losartan for now  If possible check BP at home if sbp>140 consistently then maybe place him on norvasc 5mg po Q24 for now  Next would ask him to increase his PO hydration  If possible have him repeat BMP on Monday to make sure the cr is not rising, cause if it is then his risk for AMOL will be higher with the planned contrast exposure  If cr is less than or equal to 1 5mg/dl he should be ok with the scan  As far as the scan goes he definitely needs to get hydration (can give normal saline 75cc/hr for 2 hrs pre and post)  I would recommend talking to [facility] and see how they can do it  Or other option is if the scan is done at [facility] you can place orders in [facility] and radiology co-ordinates the scan and hydration for the patient  Definitely get BMP 72 hrs post scan to ensure stability of renal function  Definitely hold the losartan the day of the scan  Hope this helps  Take care    Tess    ----- Message -----  From: Benjy Conrad MD  Sent: 9/8/2022  10:12 PM EDT  To: NEEMA GALEANO MD  Subject: Difficult case, please help                      Hi Dr Misa Bazan,    I could really use your insights -    Mr Alber Levin is a pleasant gentleman with a hx of esophageal cancer, followed at [facility]  To try to simplify, he is scheduled to have a f/u CT C/A/P on September 14th at [facility]  I saw him recently and reviewed recent BW  He has a hx of CKD, but had a notable increase in Cr and drop in GFR  This is likely multifactorial, but the question  is what to do prior to the CT  (By the way, the CT is important because he has weight loss and anemia, which is of concern )    Any thoughts? I feel like he may need some hydration and repeat labs? IV hydration vs oral? If IV, how do I coordinate this? Should I do it in the [facility] or defer to team at [facility]? I would appreciate your input - I have written a pretty detailed note in Epic from his recent visit on Wednesday   I also referred him to you for his CKD     (I really enjoyed and appreciated you talk this week )    Best regards,    Lo Pac

## 2022-09-22 ENCOUNTER — TELEPHONE (OUTPATIENT)
Dept: NEPHROLOGY | Facility: CLINIC | Age: 87
End: 2022-09-22

## 2022-09-22 ENCOUNTER — TELEPHONE (OUTPATIENT)
Dept: FAMILY MEDICINE CLINIC | Facility: CLINIC | Age: 87
End: 2022-09-22

## 2022-09-22 NOTE — TELEPHONE ENCOUNTER
Spoke with wife- Cathleen Alexis- to move appt to 9/26   However, it is a holiday for them and they could not do that day

## 2022-09-22 NOTE — TELEPHONE ENCOUNTER
----- Message from NEEMA GALEANO MD sent at 9/21/2022  2:23 PM EDT -----  Regarding: Patient appointment  Is it possible in any way that someone can look out for my schedule I am pretty sure between now and all the new appointments scheduled for next week someone will not confirm their appointment or cancel  Is it possible to have this patient put in 1 of those spots or if we know that 1 of those patients does not show up  On a side note does any other attending provider have a sooner opening then me? Worse case scenario  Thanks   Dr Slim Jacobson    ----- Message -----  From: Birgit Poster  Sent: 9/21/2022   1:09 PM EDT  To: NEEMA GALEANO MD    Received phone call from Dr Molina Bolton [phone] regarding Linda Geovanna  She was asking if there was any possibility of getting Linda Geovanna in before his scheduled December appt  She stated that his kidney levels are dropping  I did check the schedule but there is nothing sooner  She would prefer him to see you since she has discussed him with you  I told her I would send you a message to see if there was anything you could do   Please advise

## 2022-09-25 DIAGNOSIS — E78.00 PURE HYPERCHOLESTEROLEMIA: ICD-10-CM

## 2022-09-26 ENCOUNTER — TELEPHONE (OUTPATIENT)
Dept: NEPHROLOGY | Facility: CLINIC | Age: 87
End: 2022-09-26

## 2022-09-26 RX ORDER — SIMVASTATIN 10 MG
TABLET ORAL
Qty: 48 TABLET | Refills: 3 | Status: SHIPPED | OUTPATIENT
Start: 2022-09-26

## 2022-09-26 NOTE — TELEPHONE ENCOUNTER
Appointment Confirmation   Person confirmed appointment with  If not patient, name of the person Voice msg    Date and time of appointment 9/27   1:00    Patient acknowledged and will be at appointment? no    Did you advise the patient that they will need a urine sample if they are a new patient?  Yes    Did you advise the patient to bring their current medications for verification? (including any OTC) Yes    Additional Information

## 2022-09-27 ENCOUNTER — CONSULT (OUTPATIENT)
Dept: NEPHROLOGY | Facility: CLINIC | Age: 87
End: 2022-09-27
Payer: COMMERCIAL

## 2022-09-27 VITALS
HEIGHT: 69 IN | SYSTOLIC BLOOD PRESSURE: 154 MMHG | BODY MASS INDEX: 20.73 KG/M2 | WEIGHT: 140 LBS | DIASTOLIC BLOOD PRESSURE: 72 MMHG

## 2022-09-27 DIAGNOSIS — E11.3293 TYPE 2 DIABETES MELLITUS WITH BOTH EYES AFFECTED BY MILD NONPROLIFERATIVE RETINOPATHY WITHOUT MACULAR EDEMA, WITHOUT LONG-TERM CURRENT USE OF INSULIN (HCC): ICD-10-CM

## 2022-09-27 DIAGNOSIS — C15.5 PRIMARY ADENOCARCINOMA OF DISTAL THIRD OF ESOPHAGUS (HCC): ICD-10-CM

## 2022-09-27 DIAGNOSIS — E78.00 PURE HYPERCHOLESTEROLEMIA: ICD-10-CM

## 2022-09-27 DIAGNOSIS — N13.8 BPH WITH URINARY OBSTRUCTION: ICD-10-CM

## 2022-09-27 DIAGNOSIS — N18.32 STAGE 3B CHRONIC KIDNEY DISEASE (HCC): ICD-10-CM

## 2022-09-27 DIAGNOSIS — N40.1 BPH WITH URINARY OBSTRUCTION: ICD-10-CM

## 2022-09-27 DIAGNOSIS — N18.31 STAGE 3A CHRONIC KIDNEY DISEASE (HCC): Primary | ICD-10-CM

## 2022-09-27 DIAGNOSIS — E11.9 TYPE 2 DIABETES MELLITUS WITHOUT COMPLICATION, WITHOUT LONG-TERM CURRENT USE OF INSULIN (HCC): ICD-10-CM

## 2022-09-27 DIAGNOSIS — I10 ESSENTIAL HYPERTENSION: ICD-10-CM

## 2022-09-27 PROBLEM — N17.9 AKI (ACUTE KIDNEY INJURY) (HCC): Status: ACTIVE | Noted: 2022-09-27

## 2022-09-27 PROCEDURE — 99204 OFFICE O/P NEW MOD 45 MIN: CPT | Performed by: INTERNAL MEDICINE

## 2022-09-27 PROCEDURE — 1160F RVW MEDS BY RX/DR IN RCRD: CPT | Performed by: INTERNAL MEDICINE

## 2022-09-27 PROCEDURE — 3077F SYST BP >= 140 MM HG: CPT | Performed by: INTERNAL MEDICINE

## 2022-09-27 PROCEDURE — 3078F DIAST BP <80 MM HG: CPT | Performed by: INTERNAL MEDICINE

## 2022-09-27 RX ORDER — LOSARTAN POTASSIUM 50 MG/1
50 TABLET ORAL DAILY
Qty: 90 TABLET | Refills: 3 | Status: SHIPPED | OUTPATIENT
Start: 2022-09-27

## 2022-09-27 RX ORDER — AMLODIPINE BESYLATE 5 MG/1
5 TABLET ORAL AS NEEDED
Qty: 30 TABLET | Refills: 1 | Status: SHIPPED | OUTPATIENT
Start: 2022-09-27

## 2022-09-27 NOTE — PATIENT INSTRUCTIONS
- start losartan 50mg once a day   - take norvasc 5mg if blood pressure top number is greater than 150  - get blood work in a week  - call with blood pressure updates in 2 weeks    - Please call me in 10 days after having your blood work done to review the results if you do not hear back from me or my office, as I may have not received the results  - please remember to perform blood work prior to the next visit  - Please call if the blood pressure top number is greater than 150 or less than 110 consistently  - Please call if you are gaining more than 2lbs in 2 days for adjustment of water pills   ~ Please AVOID the following pain medications  LIST OF NSAIDS (NONSTEROIDAL ANTI-INFLAMMATORY DRUGS) AND GUZMÁN-2 INHIBITORS    DIFLUNISAL (DOLOBID)  IBUPROFEN (MOTRIN, ADVIL)  FLURBIPROFEN (ANSAID)  KETOPROFEN (ORUDIS, ORUVAIL)  FENOPROFEN (NALFON)  NABUMETONE (RELAFEN)  PIROXICAM (FELDENE)  NAPROXEN (ALEVE, NAPROSYN, NAPRELAN, ANAPROX)  DICLOFENAC (VOLTAREN, CATAFLAM)  INDOMETHACIN (INDOCIN)  SULINDAC (CLINORIL)  TOLMETIN (TOLETIN)  ETODOLAC (LODINE)  MELOXICAM (MOBIC)  KETOROLAC (TORADOL)  OXAPROZIN (DAYPRO)  CELECOXIB (CELEBREX)    Phosphorus diet  Follow a low phosphorus diet      Avoid these higher phosphorus foods: Choose these lower phosphorus foods:   Milk, pudding or yogurt (from animals and from many soy varieties) Rice milk (unfortified), nondairy creamer (if it doesn't have terms in the ingredients list that contain the letters "phos")   Hard cheeses, ricotta or cottage cheese, fat-free cream cheese Regular and low-fat cream cheese   Ice cream or frozen yogurt Sherbet or frozen fruit pops   Soups made with higher phosphorus ingredients (milk, dried peas, beans, lentils) Soups made with lower phosphorus ingredients (broth- or water-based with other lower phosphorus ingredients)   Whole grains, including whole-grain breads, crackers, cereal, rice and pasta Refined grains, including white bread, crackers, cereals, rice and pasta   Quick breads, biscuits, cornbread, muffins, pancakes or waffles Homemade refined (white) dinner rolls, bagels or English muffins   Dried peas (split, black-eyed), beans (black, garbanzo, lima, kidney, navy, calzada) or lentils Green peas (canned, frozen), green beans or wax beans   Organ meats, walleye, pollock or sardines Lean beef, pork, lamb, poultry or other fish   Nuts and seeds Popcorn   Peanut butter and other nut butters Jam, jelly or honey   Chocolate, including chocolate drinks Carob (chocolate-flavored) candy, hard candy or gumdrops   Liset and pepper-type sodas, flavored arellano, bottled teas (if a term in the ingredients list contains the letters "phos") Lemon-lime soda, ginger ale or root beer, plain water   Follow a moderate potassium diet  Things to do to reduce your blood pressure include working with all your physician to do the following:  ~ stop smoking if you smoke  ~ increase cardiovascular exercise like walking and swimming    ~ modify your diet to decrease fat and salt intake  ~ reduce your weight if you are overweight or obese   ~ increase the consumption of fruits, vegetables and whole grains  ~ decrease alcohol consumption if you consume alcohol    ~ try to minimize stress in your life with lifestyle modifications  ~ be compliant with your anti-hypertensive medications  ~ adjust your medications to help improve your vascular stiffness and decrease risks for heart attacks and strokes

## 2022-09-27 NOTE — PROGRESS NOTES
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) is status post radiation in 2020 and then chemotherapy with carboplatin/paclitaxil with 6 cycles in March of 2020 with subsequent robotic esophagectomy in May 2020 presents to the office for evaluation and management of abnormal renal parameters  CKD stage 3A:  - After review of records in [facility] as well as Care everywhere patient has a baseline creatinine of 0 9-1 1 mg/dL up until April 2021 and then since January 2022 creatinine has been around 1 3-1 6 mg/dL  Most recent labs show a Creatinine of 1 47 mg/dL on 09/16/2022  Renal function remains stable despite being after CT with IV contrast     - likely has underlying CKD secondary to age-related nephron loss plus hypertensive nephrosclerosis plus diabetic kidney disease (evidence of retinopathy) plus age-related nephron loss plus repeated IV contrast exposure plus some degree of underestimation of renal function based on current MDRD formula  - CT abdomen with IV contrast from 09/14/2022 showing no hydronephrosis simple solitary cysts on both kidneys  - Acid base and lytes stable, except for borderline elevated potassium levels of 5 3 mEq advised patient appropriate potassium diet  - Clinically the patient appears to be euvolemic  - Recommend to avoid use of NSAIDs, nephrotoxins  Caution advised with regards to exposure to IV contrast dye    - Discussed with the patient in depth his renal status, including the possible etiologies for CKD     - Advised the patient that when his GFR is close to 20mL/min then will start discussing about RRT(renal replacement therapy) options such as renal transplant, peritoneal dialysis and hemodialysis  - Informed the patient about the various options for Renal Replacement therapy  - Discussed with the patient how we need to work together to delay the progression of CKD with optimal BP control based on their age and co-morbidities, optimal BS control with HbA1c of <7% and trying to reduce proteinuria by the use of anti-proteinuric agents  - likely will refer over to CKD education/kidney smart at the next visit    Hypertension:  - Patient was on losartan 100 mg p o  q day however since August 2022 blood pressures were running low and was discontinued  - blood pressure is back to normal today advised patient to start losartan 50 mg p o  q day check blood work in 1 week  - also give prescription for Norvasc 5 mg p o  p r n  if SBP greater than 150  - discussed appropriate techniques of checking blood pressures and to call with blood pressure updates in 2 weeks  - Goal BP of <  140/90 based on age and comorbidities  - Instructed to follow low sodium (2gm)diet   - Advised to hold ACEI/ARBs if patient suffers from dehydration due to gastrointestinal losses due to risk of AMOL secondary to failure to autoregulate  Hemoglobin/anemia:  - Goal Hb of 10-12 g/dL  - Most recent labs suggestive of 10 4 grams/deciliter  - on B12 shots, ferrous sulfate  - discussed with patient with regards to IV iron supplementation due to iron deficiency on most recent iron studies  At this time he wishes to continue with p o  iron  We will do will check CBC prior to next visit if continues to remain low then may consider IV iron infusions at that time  CKD/MBD(Mineral Bone Disease)/vitamin-D deficiency:  - Based on patients CKD stage following is the goal of therapy  - Maintain calcium phosphorus product of < 55   - Continue patient on vitamin-D 3000 units p o  q day  - check intact PTH vitamin-D after the visit and prior to next visit    Proteinuria:  - proteinuria most likely secondary to diabetic kidney disease     - most recent UA from 01/10/2022 positive for protein no blood  - check protein creatinine ratio  - currently on therapy for proteinuria with vitamin-D, Zocor, losartan    Lipids:  - on Zocor  - goal LDL less than 70  - Management as per PCP    DM:  - management as per Primary team  - on metformin  - most recent A1c 6 1% as of 09/07/2022  - advised patient when and if renal parameters continue to deteriorate he may need to be taken off of metformin  - current dosage of 500 mg p o  q day from metformin is okay for his renal function  - positive evidence of mild diabetic retinopathy as of eye exam from 05/06/2021    Adenocarcinoma distal 3rd of esophagus:  - Management as per primary team  - follow-up with Oncology  - follow-up with thoracic surgery at [facility]  - on Prilosec  - discuss implications of long-term PPI usage for CKD in patients with the patient  - needs yearly surveillance scans recommend if possible to avoid IV contrast usage or if it is totally necessary than to hydrate patient and make sure appropriate medications are on hold to avoid nephrotoxicity    BPH:  - management primary team  - on Proscar and Flomax  - follow-up with Urology    Nutrition:  - Encouraged patient to follow a renal diet comprising of moderate potassium, low phosphorus and protein restriction to 0 8gm/kg  - Will check serum albumin with next blood work  Followup:  - Patient is to follow-up in 4 months, with lab work to be performed in 1 week and then again in a few days prior to the next visit  Advised patient to call me in 10 days to review the results if they do not hear back from me, as I may have not received the results      Fausto Snyder MD, FASN, 9/27/2022, 1:55 PM             HISTORY OF PRESENT ILLNESS: 82 y o  male with history of multiple comorbidities including hyperlipidemia, BPH, hypertension (x 30yrs), DM (x30yrs, +DR), vitamin-D deficiency, GERD and 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) is status post radiation in 2020 and then chemotherapy with carboplatin/paclitaxil with 6 cycles in March of 2020 with subsequent robotic esophagectomy in May 2020 presents to the office for evaluation and management of abnormal renal parameters  Review of records shows patient had a baseline creatinine of 0 9-1 1 mg/dL up until April 2021 and then labs from August of 2022 showed a creatinine 1 63 mg/dL  Most recent labs from 09/16/2022 at 1 47 mg/dL  Was 1 30mg/dl on 01/26/22  Last few days home sbp is about 140-150  Has been off of losartan for about a week or so  Appetite is much improved  No NSAID use no recent hospitalization no issues with edema never seen a nephrologist before no history of Luis I needing dialysis no history of kidney stones thankful for the care information that they have gotten today  Presents to the visit with his spouse would helped with the history  Review of Systems   Constitutional: Negative for appetite change, chills, fatigue and fever  HENT: Negative for congestion, rhinorrhea and sore throat  Respiratory: Negative for cough, shortness of breath and wheezing  Cardiovascular: Negative for leg swelling  Gastrointestinal: Negative for abdominal pain, constipation, diarrhea, nausea and vomiting  Genitourinary: Negative for decreased urine volume, difficulty urinating, dysuria, flank pain and hematuria  Musculoskeletal: Negative for back pain  Skin: Negative for wound  Neurological: Negative for dizziness, light-headedness and headaches  Psychiatric/Behavioral: Negative for agitation and confusion  All other systems reviewed and are negative        PAST MEDICAL HISTORY:  Past Medical History:   Diagnosis Date    Anemia     Castellanos's esophagus without dysplasia 2006    BPH with obstruction/lower urinary tract symptoms     Chronic GERD 12/27/2017    Chronic pain disorder     lumbo sacral, pt had surg and pain is improved    Colon polyp 2006    Cough     Diabetes mellitus ([address] )     Diverticulosis     Elevated PSA     Erectile dysfunction     Esophageal adenocarcinoma ([facility] [address] ) 12/2019    Frequency of micturition     GERD (gastroesophageal reflux disease)     Hiatal hernia     History of Helicobacter pylori infection 2006    Hyperlipidemia     Hypertension     Paroxysmal atrial fibrillation ([facility] [address] ) 6/10/2020    Last Assessment & Plan:  - Afib RVR post op day #6 at [facility] s/p thoracoscopy/lympadenectomy rate rhythm control with diltiazem and amiodarone  No AC started  - Amio washout completed, 2 week Zio patch monitor to r/o afib recommended, this was explained in detail with pt and his wife  - Educated on the S/S of atrial fibrillation, TIA, CVA and he is instructed to call for any heart racing or     Tobacco use disorder 12/22/2010    Last Assessment & Plan:  Pt has been smoking less than 3 pp week not interested in quitting     Weight loss 6/10/2020    Last Assessment & Plan:  - Unplanned weight loss accompanied by poor appetite and poor po intake since surgery - He has visit with surgeon tomorrow- advised to discuss his symptoms/weight loss - Recommended close follow up with PCP as well         PROBLEM LIST    Patient Active Problem List   Diagnosis    Type 2 diabetes mellitus with both eyes affected by mild nonproliferative retinopathy without macular edema, without long-term current use of insulin ([facility] [address] )    BPH with urinary obstruction    Erectile dysfunction    Essential hypertension    Hyperlipidemia    Obstructive sleep apnea    Frequency of urination    Lumbar radiculopathy, chronic    Iron deficiency anemia secondary to inadequate dietary iron intake    Primary adenocarcinoma of distal third of esophagus (HCC)    SVT (supraventricular tachycardia) ([facility] [address] )    RBBB    Vitamin B12 deficiency    Stage 3b chronic kidney disease ([facility] [address] )    AMOL (acute kidney injury) ([facility] [address] )    Stage 3a chronic kidney disease ([facility] [address] )       PAST SURGICAL HISTORY:  Past Surgical History:   Procedure Laterality Date    APPENDECTOMY      BACK SURGERY      CATARACT EXTRACTION Bilateral     COLONOSCOPY  06/2011    diverticulosis    COLONOSCOPY  11/2015    diverticulosis    EGD  12/2019    distal esophageal adenocarcinoma    EGD  05/2021    status post esophagus resection, esophagogastric anastomosis appears normal   Dilated to 50 Bengali    ESOPH[facility]CT[facility]Y  05/2020    [facility] following chemoradiation for T3N0M0 adenocarcinoma    LUMBAR LAMINECTOMY      MASTOID SURGERY      NASAL SEPTUM SURGERY      VASECTOMY         SOCIAL HISTORY :   reports that he has quit smoking  He has never used smokeless tobacco  He reports current alcohol use of about 1 0 standard drink of alcohol per week  He reports that he does not use drugs  FAMILY HISTORY:  Family History   Problem Relation Age of Onset    Heart disease Mother     Hypertension Mother     Kidney disease Father     Colon cancer Father     Leukemia Cousin        ALLERGIES:  No Known Allergies        PHYSICAL EXAM:  Vitals:    09/27/22 1238 09/27/22 1318   BP: 164/80 154/72   BP Location: Left arm    Patient Position: Sitting    Cuff Size: Standard    Weight: 63 5 kg (140 lb)    Height: 5' 9" (1 753 m)      Body mass index is 20 67 kg/m²  Physical Exam  Vitals reviewed  Constitutional:       General: He is not in acute distress  Appearance: Normal appearance  He is normal weight  He is not ill-appearing, toxic-appearing or diaphoretic  HENT:      Head: Normocephalic and atraumatic  Mouth/Throat:      Mouth: Mucous membranes are moist       Pharynx: Oropharynx is clear  No oropharyngeal exudate  Eyes:      General: No scleral icterus       Conjunctiva/sclera: Conjunctivae normal    Cardiovascular:      Rate and Rhythm: Normal rate  Heart sounds: Normal heart sounds  No friction rub  Pulmonary:      Effort: Pulmonary effort is normal  No respiratory distress  Breath sounds: Normal breath sounds  No stridor  No wheezing  Abdominal:      General: There is no distension  Palpations: Abdomen is soft  There is no mass  Tenderness: There is no abdominal tenderness  There is no right CVA tenderness or left CVA tenderness  Musculoskeletal:         General: No swelling  Cervical back: Normal range of motion and neck supple  No rigidity  Skin:     General: Skin is warm  Coloration: Skin is not jaundiced  Neurological:      General: No focal deficit present  Mental Status: He is alert and oriented to person, place, and time     Psychiatric:         Mood and Affect: Mood normal          Behavior: Behavior normal            LABORATORY DATA:     Results from last 6 Months   Lab Units 09/16/22  0903 09/12/22  0827 08/24/22  0837   WBC Thousand/uL  --  5 22 5 83   HEMOGLOBIN g/dL  --  10 4* 11 0*   HEMATOCRIT %  --  33 0* 35 8*   PLATELETS Thousands/uL  --  226 197   POTASSIUM mmol/L 5 3 4 6 4 8   CHLORIDE mmol/L 107 109* 110*   CO2 mmol/L 28 27 23   BUN mg/dL 17 18 28*   CREATININE mg/dL 1 47* 1 38* 1 63*   CALCIUM mg/dL 9 2 8 9 9 3        rest all reviewed    RADIOLOGY:  No orders to display     Rest all reviewed        MEDICATIONS:    Current Outpatient Medications:     amLODIPine (NORVASC) 5 mg tablet, Take 1 tablet (5 mg total) by mouth if needed (if blood pressure top number is consistently greater than 150), Disp: 30 tablet, Rfl: 1    Cholecalciferol 25 MCG (1000 UT) capsule, Take 1,000 Units by mouth 3 (three) times a day , Disp: , Rfl:     Ferrous Sulfate (FEOSOL PO), Take 1 tablet by mouth daily, Disp: , Rfl:     finasteride (PROSCAR) 5 mg tablet, TAKE 1 TABLET BY MOUTH EVERY DAY, Disp: 90 tablet, Rfl: 3    losartan (COZAAR) 50 mg tablet, Take 1 tablet (50 mg total) by mouth daily, Disp: 90 tablet, Rfl: 3    metFORMIN (GLUCOPHAGE) 500 mg tablet, Take 1 tablet (500 mg total) by mouth daily with breakfast, Disp: 90 tablet, Rfl: 3    omeprazole (PriLOSEC) 40 MG capsule, Take 1 capsule (40 mg total) by mouth 2 (two) times a day, Disp: 180 capsule, Rfl: 3    simvastatin (ZOCOR) 10 mg tablet, TAKE 1 TABLET BY MOUTH FOUR TIMES WEEKLY, Disp: 48 tablet, Rfl: 3    tamsulosin (FLOMAX) 0 4 mg, TAKE 1 CAPSULE BY MOUTH EVERY DAY WITH DINNER, Disp: 90 capsule, Rfl: 3    Current Facility-Administered Medications:     cyanocobalamin injection 1,000 mcg, 1,000 mcg, Intramuscular, Q30 Days, Dafne Gaines MD, 1,000 mcg at 09/07/22 1445        Portions of the record may have been created with voice recognition software  Occasional wrong word or "sound a like" substitutions may have occurred due to the inherent limitations of voice recognition software  Read the chart carefully and recognize, using context, where substitutions have occurred  If you have any questions, please contact the dictating provider

## 2022-09-29 ENCOUNTER — TELEPHONE (OUTPATIENT)
Dept: FAMILY MEDICINE CLINIC | Facility: CLINIC | Age: 87
End: 2022-09-29

## 2022-09-29 NOTE — TELEPHONE ENCOUNTER
Regarding: FW: Message from Dr Maddi Rubalcava    ----- Message -----  From: Lawrence Gomez  Sent: 9/29/2022   2:48 PM EDT  To: [facility]  Subject: Message from Dr Maddi Rubalcava                             Dear Dr Kolby Rodarte,  I made an appointment with a nurse for Ed for a B12 shot in October  As mentioned above, he feels like a different person  Thank you    Douglas Alpers

## 2022-10-03 ENCOUNTER — LAB (OUTPATIENT)
Dept: LAB | Facility: MEDICAL CENTER | Age: 87
End: 2022-10-03
Payer: COMMERCIAL

## 2022-10-03 DIAGNOSIS — E11.9 TYPE 2 DIABETES MELLITUS WITHOUT COMPLICATION, WITHOUT LONG-TERM CURRENT USE OF INSULIN (HCC): ICD-10-CM

## 2022-10-03 DIAGNOSIS — C15.5 PRIMARY ADENOCARCINOMA OF DISTAL THIRD OF ESOPHAGUS (HCC): ICD-10-CM

## 2022-10-03 DIAGNOSIS — E78.00 PURE HYPERCHOLESTEROLEMIA: ICD-10-CM

## 2022-10-03 DIAGNOSIS — I10 ESSENTIAL HYPERTENSION: ICD-10-CM

## 2022-10-03 DIAGNOSIS — N18.31 STAGE 3A CHRONIC KIDNEY DISEASE (HCC): ICD-10-CM

## 2022-10-03 LAB
25(OH)D3 SERPL-MCNC: 47.1 NG/ML (ref 30–100)
ALBUMIN SERPL BCP-MCNC: 3.5 G/DL (ref 3.5–5)
ANION GAP SERPL CALCULATED.3IONS-SCNC: 4 MMOL/L (ref 4–13)
BACTERIA UR QL AUTO: ABNORMAL /HPF
BILIRUB UR QL STRIP: NEGATIVE
BUN SERPL-MCNC: 23 MG/DL (ref 5–25)
CALCIUM SERPL-MCNC: 9.3 MG/DL (ref 8.3–10.1)
CHLORIDE SERPL-SCNC: 108 MMOL/L (ref 96–108)
CLARITY UR: CLEAR
CO2 SERPL-SCNC: 26 MMOL/L (ref 21–32)
COLOR UR: ABNORMAL
CREAT SERPL-MCNC: 1.52 MG/DL (ref 0.6–1.3)
CREAT UR-MCNC: 169 MG/DL
GFR SERPL CREATININE-BSD FRML MDRD: 41 ML/MIN/1.73SQ M
GLUCOSE P FAST SERPL-MCNC: 108 MG/DL (ref 65–99)
GLUCOSE UR STRIP-MCNC: NEGATIVE MG/DL
HGB UR QL STRIP.AUTO: NEGATIVE
KETONES UR STRIP-MCNC: NEGATIVE MG/DL
LEUKOCYTE ESTERASE UR QL STRIP: NEGATIVE
MUCOUS THREADS UR QL AUTO: ABNORMAL
NITRITE UR QL STRIP: NEGATIVE
NON-SQ EPI CELLS URNS QL MICRO: ABNORMAL /HPF
PH UR STRIP.AUTO: 5.5 [PH]
PHOSPHATE SERPL-MCNC: 3.7 MG/DL (ref 2.3–4.1)
POTASSIUM SERPL-SCNC: 4.9 MMOL/L (ref 3.5–5.3)
PROT UR STRIP-MCNC: ABNORMAL MG/DL
PROT UR-MCNC: 24 MG/DL
PROT/CREAT UR: 0.14 MG/G{CREAT} (ref 0–0.1)
PTH-INTACT SERPL-MCNC: 94.2 PG/ML (ref 18.4–80.1)
RBC #/AREA URNS AUTO: ABNORMAL /HPF
SODIUM SERPL-SCNC: 138 MMOL/L (ref 135–147)
SP GR UR STRIP.AUTO: 1.02 (ref 1–1.03)
UROBILINOGEN UR STRIP-ACNC: <2 MG/DL
WBC #/AREA URNS AUTO: ABNORMAL /HPF

## 2022-10-03 PROCEDURE — 36415 COLL VENOUS BLD VENIPUNCTURE: CPT

## 2022-10-03 PROCEDURE — 83970 ASSAY OF PARATHORMONE: CPT

## 2022-10-03 PROCEDURE — 84156 ASSAY OF PROTEIN URINE: CPT

## 2022-10-03 PROCEDURE — 81001 URINALYSIS AUTO W/SCOPE: CPT

## 2022-10-03 PROCEDURE — 80069 RENAL FUNCTION PANEL: CPT

## 2022-10-03 PROCEDURE — 82306 VITAMIN D 25 HYDROXY: CPT

## 2022-10-03 PROCEDURE — 82570 ASSAY OF URINE CREATININE: CPT

## 2022-10-04 ENCOUNTER — TELEPHONE (OUTPATIENT)
Dept: NEPHROLOGY | Facility: CLINIC | Age: 87
End: 2022-10-04

## 2022-10-04 NOTE — RESULT ENCOUNTER NOTE
Please let the patient know that most recent lab work in terms of renal parameters are stable  Electrolytes are stable potassium is improved  The parathyroid gland level slightly elevated we will monitor to see if it remains elevated at the next visit that we may need to start on special type of vitamin-D  Will discuss further at the upcoming visit, let me know if they have any questions or concerns      Thanks

## 2022-10-04 NOTE — TELEPHONE ENCOUNTER
----- Message from NEEMA GALEANO MD sent at 10/4/2022 10:06 AM EDT -----  Please let the patient know that most recent lab work in terms of renal parameters are stable  Electrolytes are stable potassium is improved  The parathyroid gland level slightly elevated we will monitor to see if it remains elevated at the next visit that we may need to start on special type of vitamin-D  Will discuss further at the upcoming visit, let me know if they have any questions or concerns      Thanks

## 2022-10-05 ENCOUNTER — TELEPHONE (OUTPATIENT)
Dept: NEPHROLOGY | Facility: CLINIC | Age: 87
End: 2022-10-05

## 2022-10-05 DIAGNOSIS — N18.32 STAGE 3B CHRONIC KIDNEY DISEASE (HCC): Primary | ICD-10-CM

## 2022-10-05 NOTE — TELEPHONE ENCOUNTER
Spoke to patients wife I let her know labs of renal parameters are stable,Electrolytes are stable,potassium improved and that the provider will monitor to see is parathyroid gland level still high and the next visit they might start on special vitamin D  And they provider will discuss on the next visit

## 2022-10-11 ENCOUNTER — CLINICAL SUPPORT (OUTPATIENT)
Dept: FAMILY MEDICINE CLINIC | Facility: CLINIC | Age: 87
End: 2022-10-11
Payer: COMMERCIAL

## 2022-10-11 DIAGNOSIS — E53.8 VITAMIN B12 DEFICIENCY: Primary | ICD-10-CM

## 2022-10-11 PROCEDURE — 96372 THER/PROPH/DIAG INJ SC/IM: CPT

## 2022-10-11 RX ORDER — CYANOCOBALAMIN 1000 UG/ML
1000 INJECTION, SOLUTION INTRAMUSCULAR; SUBCUTANEOUS ONCE
Status: COMPLETED | OUTPATIENT
Start: 2022-10-11 | End: 2022-10-11

## 2022-10-11 RX ADMIN — CYANOCOBALAMIN 1000 MCG: 1000 INJECTION, SOLUTION INTRAMUSCULAR; SUBCUTANEOUS at 13:42

## 2022-10-11 NOTE — PROGRESS NOTES
Pt presented in office for b-12 injection  Med was given to right delt  Pt tolerated injection well  Per pt he preferred to come into the office every 30 days and have nursing staff administer B-12  He stated he was uncomfortable with wife doing it  I advised pt if he changes his mind in the long run we can touch base then  Pt scheduled NV for next dose with office

## 2022-10-12 NOTE — TELEPHONE ENCOUNTER
As a final attempt, a third outreach has been made via telephone call  Please see Contacts section for details  This encounter will be closed and completed by end of day  Should we receive the requested information because of previous outreach attempts, the requested patient's chart will be updated appropriately       Thank you  Kimberley Gaviria

## 2022-10-17 ENCOUNTER — TELEPHONE (OUTPATIENT)
Dept: FAMILY MEDICINE CLINIC | Facility: CLINIC | Age: 87
End: 2022-10-17

## 2022-10-17 DIAGNOSIS — N40.1 BPH WITH URINARY OBSTRUCTION: ICD-10-CM

## 2022-10-17 DIAGNOSIS — N13.8 BPH WITH URINARY OBSTRUCTION: ICD-10-CM

## 2022-10-17 NOTE — TELEPHONE ENCOUNTER
Patient wife called asking if patient needed an order for blood before his next apt 11/2, according to patient wife discharge summary indicated to get blood work done before his next ap But there is blood work ordered for next year please call patient wife regarding this matter

## 2022-10-18 DIAGNOSIS — E11.3293 TYPE 2 DIABETES MELLITUS WITH BOTH EYES AFFECTED BY MILD NONPROLIFERATIVE RETINOPATHY WITHOUT MACULAR EDEMA, WITHOUT LONG-TERM CURRENT USE OF INSULIN (HCC): ICD-10-CM

## 2022-10-18 DIAGNOSIS — D50.8 IRON DEFICIENCY ANEMIA SECONDARY TO INADEQUATE DIETARY IRON INTAKE: ICD-10-CM

## 2022-10-18 DIAGNOSIS — E78.00 PURE HYPERCHOLESTEROLEMIA: ICD-10-CM

## 2022-10-18 DIAGNOSIS — I10 ESSENTIAL HYPERTENSION: ICD-10-CM

## 2022-10-18 DIAGNOSIS — N18.32 STAGE 3B CHRONIC KIDNEY DISEASE (HCC): ICD-10-CM

## 2022-10-18 DIAGNOSIS — E53.8 VITAMIN B12 DEFICIENCY: ICD-10-CM

## 2022-10-18 DIAGNOSIS — C15.5 PRIMARY ADENOCARCINOMA OF DISTAL THIRD OF ESOPHAGUS (HCC): Primary | ICD-10-CM

## 2022-10-19 RX ORDER — TAMSULOSIN HYDROCHLORIDE 0.4 MG/1
CAPSULE ORAL
Qty: 90 CAPSULE | Refills: 3 | Status: SHIPPED | OUTPATIENT
Start: 2022-10-19

## 2022-10-20 ENCOUNTER — APPOINTMENT (OUTPATIENT)
Dept: LAB | Facility: MEDICAL CENTER | Age: 87
End: 2022-10-20
Payer: COMMERCIAL

## 2022-10-20 DIAGNOSIS — E11.3293 TYPE 2 DIABETES MELLITUS WITH BOTH EYES AFFECTED BY MILD NONPROLIFERATIVE RETINOPATHY WITHOUT MACULAR EDEMA, WITHOUT LONG-TERM CURRENT USE OF INSULIN (HCC): ICD-10-CM

## 2022-10-20 DIAGNOSIS — I10 ESSENTIAL HYPERTENSION: ICD-10-CM

## 2022-10-20 DIAGNOSIS — E53.8 VITAMIN B12 DEFICIENCY: ICD-10-CM

## 2022-10-20 DIAGNOSIS — N18.32 STAGE 3B CHRONIC KIDNEY DISEASE (HCC): ICD-10-CM

## 2022-10-20 DIAGNOSIS — E78.00 PURE HYPERCHOLESTEROLEMIA: ICD-10-CM

## 2022-10-20 DIAGNOSIS — D50.8 IRON DEFICIENCY ANEMIA SECONDARY TO INADEQUATE DIETARY IRON INTAKE: ICD-10-CM

## 2022-10-20 DIAGNOSIS — C15.5 PRIMARY ADENOCARCINOMA OF DISTAL THIRD OF ESOPHAGUS (HCC): ICD-10-CM

## 2022-10-20 LAB
ALBUMIN SERPL BCP-MCNC: 3.5 G/DL (ref 3.5–5)
ALP SERPL-CCNC: 73 U/L (ref 46–116)
ALT SERPL W P-5'-P-CCNC: 22 U/L (ref 12–78)
ANION GAP SERPL CALCULATED.3IONS-SCNC: 5 MMOL/L (ref 4–13)
AST SERPL W P-5'-P-CCNC: 17 U/L (ref 5–45)
BASOPHILS # BLD AUTO: 0.05 THOUSANDS/ÂΜL (ref 0–0.1)
BASOPHILS NFR BLD AUTO: 1 % (ref 0–1)
BILIRUB SERPL-MCNC: 0.48 MG/DL (ref 0.2–1)
BUN SERPL-MCNC: 26 MG/DL (ref 5–25)
CALCIUM SERPL-MCNC: 9.4 MG/DL (ref 8.3–10.1)
CHLORIDE SERPL-SCNC: 105 MMOL/L (ref 96–108)
CHOLEST SERPL-MCNC: 168 MG/DL
CO2 SERPL-SCNC: 25 MMOL/L (ref 21–32)
CREAT SERPL-MCNC: 1.54 MG/DL (ref 0.6–1.3)
EOSINOPHIL # BLD AUTO: 0.09 THOUSAND/ÂΜL (ref 0–0.61)
EOSINOPHIL NFR BLD AUTO: 1 % (ref 0–6)
ERYTHROCYTE [DISTWIDTH] IN BLOOD BY AUTOMATED COUNT: 14.4 % (ref 11.6–15.1)
EST. AVERAGE GLUCOSE BLD GHB EST-MCNC: 128 MG/DL
GFR SERPL CREATININE-BSD FRML MDRD: 40 ML/MIN/1.73SQ M
GLUCOSE P FAST SERPL-MCNC: 89 MG/DL (ref 65–99)
HBA1C MFR BLD: 6.1 %
HCT VFR BLD AUTO: 34.9 % (ref 36.5–49.3)
HDLC SERPL-MCNC: 99 MG/DL
HGB BLD-MCNC: 11 G/DL (ref 12–17)
IMM GRANULOCYTES # BLD AUTO: 0.03 THOUSAND/UL (ref 0–0.2)
IMM GRANULOCYTES NFR BLD AUTO: 1 % (ref 0–2)
LDLC SERPL CALC-MCNC: 54 MG/DL (ref 0–100)
LYMPHOCYTES # BLD AUTO: 0.61 THOUSANDS/ÂΜL (ref 0.6–4.47)
LYMPHOCYTES NFR BLD AUTO: 10 % (ref 14–44)
MCH RBC QN AUTO: 30.4 PG (ref 26.8–34.3)
MCHC RBC AUTO-ENTMCNC: 31.5 G/DL (ref 31.4–37.4)
MCV RBC AUTO: 96 FL (ref 82–98)
MONOCYTES # BLD AUTO: 0.86 THOUSAND/ÂΜL (ref 0.17–1.22)
MONOCYTES NFR BLD AUTO: 14 % (ref 4–12)
NEUTROPHILS # BLD AUTO: 4.65 THOUSANDS/ÂΜL (ref 1.85–7.62)
NEUTS SEG NFR BLD AUTO: 73 % (ref 43–75)
NONHDLC SERPL-MCNC: 69 MG/DL
NRBC BLD AUTO-RTO: 0 /100 WBCS
PHOSPHATE SERPL-MCNC: 3.1 MG/DL (ref 2.3–4.1)
PLATELET # BLD AUTO: 285 THOUSANDS/UL (ref 149–390)
PMV BLD AUTO: 11.4 FL (ref 8.9–12.7)
POTASSIUM SERPL-SCNC: 5.1 MMOL/L (ref 3.5–5.3)
PROT SERPL-MCNC: 7.2 G/DL (ref 6.4–8.4)
RBC # BLD AUTO: 3.62 MILLION/UL (ref 3.88–5.62)
SODIUM SERPL-SCNC: 135 MMOL/L (ref 135–147)
T4 SERPL-MCNC: 9.2 UG/DL (ref 4.7–13.3)
TRIGL SERPL-MCNC: 77 MG/DL
TSH SERPL DL<=0.05 MIU/L-ACNC: 2.2 UIU/ML (ref 0.45–4.5)
VIT B12 SERPL-MCNC: 781 PG/ML (ref 100–900)
WBC # BLD AUTO: 6.29 THOUSAND/UL (ref 4.31–10.16)

## 2022-10-20 PROCEDURE — 82607 VITAMIN B-12: CPT

## 2022-10-20 PROCEDURE — 84436 ASSAY OF TOTAL THYROXINE: CPT

## 2022-10-20 PROCEDURE — 84100 ASSAY OF PHOSPHORUS: CPT

## 2022-10-20 PROCEDURE — 83918 ORGANIC ACIDS TOTAL QUANT: CPT

## 2022-10-20 PROCEDURE — 36415 COLL VENOUS BLD VENIPUNCTURE: CPT

## 2022-10-20 PROCEDURE — 84443 ASSAY THYROID STIM HORMONE: CPT

## 2022-10-20 PROCEDURE — 80061 LIPID PANEL: CPT

## 2022-10-20 PROCEDURE — 85025 COMPLETE CBC W/AUTO DIFF WBC: CPT

## 2022-10-20 PROCEDURE — 83036 HEMOGLOBIN GLYCOSYLATED A1C: CPT

## 2022-10-20 PROCEDURE — 80053 COMPREHEN METABOLIC PANEL: CPT

## 2022-10-24 LAB — METHYLMALONATE SERPL-SCNC: 556 NMOL/L (ref 0–378)

## 2022-11-01 ENCOUNTER — RA CDI HCC (OUTPATIENT)
Dept: OTHER | Facility: HOSPITAL | Age: 87
End: 2022-11-01

## 2022-11-01 PROBLEM — N17.9 AKI (ACUTE KIDNEY INJURY) (HCC): Status: RESOLVED | Noted: 2022-09-27 | Resolved: 2022-11-01

## 2022-11-01 PROBLEM — N18.31 STAGE 3A CHRONIC KIDNEY DISEASE (HCC): Status: RESOLVED | Noted: 2022-09-27 | Resolved: 2022-11-01

## 2022-11-01 NOTE — PROGRESS NOTES
[facility]  coding opportunities     E11 22     Chart Reviewed number of suggestions sent to Provider: 1     Patients Insurance     Medicare Insurance: [address] [place] [address]

## 2022-11-02 ENCOUNTER — OFFICE VISIT (OUTPATIENT)
Dept: FAMILY MEDICINE CLINIC | Facility: CLINIC | Age: 87
End: 2022-11-02

## 2022-11-02 VITALS
BODY MASS INDEX: 21.18 KG/M2 | HEART RATE: 60 BPM | SYSTOLIC BLOOD PRESSURE: 112 MMHG | DIASTOLIC BLOOD PRESSURE: 64 MMHG | HEIGHT: 69 IN | WEIGHT: 143 LBS

## 2022-11-02 DIAGNOSIS — C15.5 PRIMARY ADENOCARCINOMA OF DISTAL THIRD OF ESOPHAGUS (HCC): ICD-10-CM

## 2022-11-02 DIAGNOSIS — N13.8 BPH WITH URINARY OBSTRUCTION: ICD-10-CM

## 2022-11-02 DIAGNOSIS — N40.1 BPH WITH URINARY OBSTRUCTION: ICD-10-CM

## 2022-11-02 DIAGNOSIS — N18.32 STAGE 3B CHRONIC KIDNEY DISEASE (HCC): ICD-10-CM

## 2022-11-02 DIAGNOSIS — E78.00 PURE HYPERCHOLESTEROLEMIA: ICD-10-CM

## 2022-11-02 DIAGNOSIS — I10 ESSENTIAL HYPERTENSION: Primary | ICD-10-CM

## 2022-11-02 DIAGNOSIS — D50.8 IRON DEFICIENCY ANEMIA SECONDARY TO INADEQUATE DIETARY IRON INTAKE: ICD-10-CM

## 2022-11-02 DIAGNOSIS — E53.8 VITAMIN B12 DEFICIENCY: ICD-10-CM

## 2022-11-02 NOTE — PROGRESS NOTES
Name: Precious Ray      : 1935      MRN: [id number]  Encounter Provider: Norris Hollis MD  Encounter Date: 2022   [facility]     Pt presents with his wife today to reviewe chronic medical conditions and recent BW  He notes feeling so much better since starting B12 IM injections  His labs are stable, Hgb is improved, renal function is stable  Methylmalonic acid remains elevated, although B12 level is WNL  1  Essential hypertension  Assessment & Plan:  Stable on amlodipine and losartan  2  Iron deficiency anemia secondary to inadequate dietary iron intake  Assessment & Plan:  Continue iron supplements  Hgb stable  3  Vitamin B12 deficiency    4  Stage 3b chronic kidney disease [facility])  Assessment & Plan:  Lab Results   Component Value Date    EGFR 40 10/20/2022    EGFR 41 10/03/2022    EGFR 42 2022    CREATININE 1 54 (H) 10/20/2022    CREATININE 1 52 (H) 10/03/2022    CREATININE 1 47 (H) 2022       Stable  Continue to avoid NSAIDs and OTC supplements  F/U with Nephro  Orders:  -     Ambulatory Referral to Nutrition Services; Future    5  Primary adenocarcinoma of distal third of esophagus ([facility] [address] )  -     Ambulatory Referral to Nutrition Services; Future    6  Pure hypercholesterolemia  Assessment & Plan:  Stable on Zocor  7  BPH with urinary obstruction  Assessment & Plan:  Symptoms stable on tamsulosin and finasteride  Dr Clark Valiente  Labs reviewed at length with patient and his wife  Referral to nutrition services in light of renal disease and co-morbidities  RTO 4 months  Subjective      Amlodipine 5mg and losartan increased to 100  Feeling so much better since starting IM B12  Drinking alot    Review of Systems   Constitutional:        See HPI   HENT: Negative for congestion, ear pain, mouth sores, sinus pressure and trouble swallowing  Eyes: Negative for discharge, redness and itching  Respiratory: Negative for apnea, cough, chest tightness, shortness of breath, wheezing and stridor  Cardiovascular: Negative for chest pain, palpitations and leg swelling  Gastrointestinal: Negative for abdominal distention, abdominal pain, blood in stool, constipation, diarrhea, nausea and vomiting  Endocrine: Negative for cold intolerance and heat intolerance  Genitourinary: Negative for difficulty urinating, dysuria, flank pain and urgency  Musculoskeletal: Negative for arthralgias and myalgias  Skin: Negative for rash  Neurological: Negative for dizziness, seizures, syncope, speech difficulty, weakness, light-headedness, numbness and headaches  Hematological: Negative for adenopathy  Psychiatric/Behavioral: Negative for agitation, behavioral problems, confusion and sleep disturbance  The patient is not nervous/anxious          Current Outpatient Medications on File Prior to Visit   Medication Sig   • amLODIPine (NORVASC) 5 mg tablet Take 1 tablet (5 mg total) by mouth if needed (if blood pressure top number is consistently greater than 150) (Patient taking differently: Take 5 mg by mouth daily)   • Cholecalciferol 25 MCG (1000 UT) capsule Take 1,000 Units by mouth 3 (three) times a day    • Ferrous Sulfate (FEOSOL PO) Take 1 tablet by mouth daily   • finasteride (PROSCAR) 5 mg tablet TAKE 1 TABLET BY MOUTH EVERY DAY   • losartan (COZAAR) 50 mg tablet Take 1 tablet (50 mg total) by mouth daily (Patient taking differently: Take 100 mg by mouth daily)   • metFORMIN (GLUCOPHAGE) 500 mg tablet Take 1 tablet (500 mg total) by mouth daily with breakfast   • simvastatin (ZOCOR) 10 mg tablet TAKE 1 TABLET BY MOUTH FOUR TIMES WEEKLY   • tamsulosin (FLOMAX) 0 4 mg TAKE 1 CAPSULE BY MOUTH EVERY DAY WITH DINNER   • omeprazole (PriLOSEC) 40 MG capsule Take 1 capsule (40 mg total) by mouth 2 (two) times a day       Objective     /64   Pulse 60   Ht 5' 9" (1 753 m)   Wt 64 9 kg (143 lb)   BMI 21 12 kg/m²     Physical Exam  Vitals and nursing note reviewed  Constitutional:       General: He is not in acute distress  Appearance: Normal appearance  Eyes:      Conjunctiva/sclera: Conjunctivae normal    Neck:      Vascular: No carotid bruit  Cardiovascular:      Pulses: Normal pulses  Heart sounds: Normal heart sounds  No murmur heard  No friction rub  No gallop  Pulmonary:      Effort: Pulmonary effort is normal  No respiratory distress  Breath sounds: No stridor  No wheezing, rhonchi or rales  Chest:      Chest wall: No tenderness  Musculoskeletal:      Cervical back: Normal range of motion and neck supple  Right lower leg: No edema  Left lower leg: No edema  Skin:     General: Skin is warm  Coloration: Skin is not jaundiced  Neurological:      Mental Status: He is alert  Psychiatric:         Mood and Affect: Mood normal          Behavior: Behavior normal          Thought Content:  Thought content normal          Judgment: Judgment normal        Crystal Loera MD

## 2022-11-07 NOTE — ASSESSMENT & PLAN NOTE
Lab Results   Component Value Date    EGFR 40 10/20/2022    EGFR 41 10/03/2022    EGFR 42 09/16/2022    CREATININE 1 54 (H) 10/20/2022    CREATININE 1 52 (H) 10/03/2022    CREATININE 1 47 (H) 09/16/2022       Stable  Continue to avoid NSAIDs and OTC supplements  F/U with Nephro

## 2022-11-15 ENCOUNTER — CLINICAL SUPPORT (OUTPATIENT)
Dept: FAMILY MEDICINE CLINIC | Facility: CLINIC | Age: 87
End: 2022-11-15

## 2022-11-15 DIAGNOSIS — E53.8 VITAMIN B12 DEFICIENCY: Primary | ICD-10-CM

## 2022-11-15 RX ADMIN — CYANOCOBALAMIN 1000 MCG: 1000 INJECTION, SOLUTION INTRAMUSCULAR; SUBCUTANEOUS at 13:22

## 2022-12-15 ENCOUNTER — CLINICAL SUPPORT (OUTPATIENT)
Dept: FAMILY MEDICINE CLINIC | Facility: CLINIC | Age: 87
End: 2022-12-15

## 2022-12-15 DIAGNOSIS — E53.8 VITAMIN B12 DEFICIENCY: Primary | ICD-10-CM

## 2022-12-15 RX ADMIN — CYANOCOBALAMIN 1000 MCG: 1000 INJECTION, SOLUTION INTRAMUSCULAR; SUBCUTANEOUS at 14:07

## 2022-12-15 NOTE — PROGRESS NOTES
After obtaining consent, and per orders of Dr Kamari Cross , injection of B12 given by Mario Mtz  Patient instructed to remain in clinic for 20 minutes afterwards, and to report any adverse reaction to me immediately

## 2023-01-17 ENCOUNTER — CLINICAL SUPPORT (OUTPATIENT)
Dept: FAMILY MEDICINE CLINIC | Facility: CLINIC | Age: 88
End: 2023-01-17

## 2023-01-17 DIAGNOSIS — E53.8 VITAMIN B12 DEFICIENCY: Primary | ICD-10-CM

## 2023-01-17 RX ADMIN — CYANOCOBALAMIN 1000 MCG: 1000 INJECTION, SOLUTION INTRAMUSCULAR; SUBCUTANEOUS at 13:48

## 2023-01-21 DIAGNOSIS — E11.3293 TYPE 2 DIABETES MELLITUS WITH BOTH EYES AFFECTED BY MILD NONPROLIFERATIVE RETINOPATHY WITHOUT MACULAR EDEMA, WITHOUT LONG-TERM CURRENT USE OF INSULIN (HCC): ICD-10-CM

## 2023-01-21 DIAGNOSIS — N18.32 STAGE 3B CHRONIC KIDNEY DISEASE (HCC): ICD-10-CM

## 2023-01-21 DIAGNOSIS — C15.5 PRIMARY ADENOCARCINOMA OF DISTAL THIRD OF ESOPHAGUS (HCC): Primary | ICD-10-CM

## 2023-01-26 LAB
LEFT EYE DIABETIC RETINOPATHY: NORMAL
RIGHT EYE DIABETIC RETINOPATHY: NORMAL
SEVERITY (EYE EXAM): NORMAL

## 2023-01-30 ENCOUNTER — APPOINTMENT (OUTPATIENT)
Dept: LAB | Facility: MEDICAL CENTER | Age: 88
End: 2023-01-30

## 2023-01-30 DIAGNOSIS — I10 ESSENTIAL HYPERTENSION: ICD-10-CM

## 2023-01-30 DIAGNOSIS — N13.8 BPH WITH URINARY OBSTRUCTION: ICD-10-CM

## 2023-01-30 DIAGNOSIS — N40.1 BPH WITH URINARY OBSTRUCTION: ICD-10-CM

## 2023-01-30 DIAGNOSIS — E11.3293 TYPE 2 DIABETES MELLITUS WITH BOTH EYES AFFECTED BY MILD NONPROLIFERATIVE RETINOPATHY WITHOUT MACULAR EDEMA, WITHOUT LONG-TERM CURRENT USE OF INSULIN (HCC): ICD-10-CM

## 2023-01-30 DIAGNOSIS — E11.9 TYPE 2 DIABETES MELLITUS WITHOUT COMPLICATION, WITHOUT LONG-TERM CURRENT USE OF INSULIN (HCC): ICD-10-CM

## 2023-01-30 DIAGNOSIS — C15.5 PRIMARY ADENOCARCINOMA OF DISTAL THIRD OF ESOPHAGUS (HCC): ICD-10-CM

## 2023-01-30 DIAGNOSIS — E78.00 PURE HYPERCHOLESTEROLEMIA: ICD-10-CM

## 2023-01-30 DIAGNOSIS — N18.31 STAGE 3A CHRONIC KIDNEY DISEASE (HCC): ICD-10-CM

## 2023-01-30 LAB
25(OH)D3 SERPL-MCNC: 30.8 NG/ML (ref 30–100)
ALBUMIN SERPL BCP-MCNC: 3.7 G/DL (ref 3.5–5)
ANION GAP SERPL CALCULATED.3IONS-SCNC: 5 MMOL/L (ref 4–13)
BASOPHILS # BLD AUTO: 0.05 THOUSANDS/ÂΜL (ref 0–0.1)
BASOPHILS NFR BLD AUTO: 1 % (ref 0–1)
BUN SERPL-MCNC: 22 MG/DL (ref 5–25)
CALCIUM SERPL-MCNC: 9.3 MG/DL (ref 8.3–10.1)
CHLORIDE SERPL-SCNC: 109 MMOL/L (ref 96–108)
CO2 SERPL-SCNC: 27 MMOL/L (ref 21–32)
CREAT SERPL-MCNC: 1.57 MG/DL (ref 0.6–1.3)
CREAT UR-MCNC: 149 MG/DL
EOSINOPHIL # BLD AUTO: 0.15 THOUSAND/ÂΜL (ref 0–0.61)
EOSINOPHIL NFR BLD AUTO: 2 % (ref 0–6)
ERYTHROCYTE [DISTWIDTH] IN BLOOD BY AUTOMATED COUNT: 13.8 % (ref 11.6–15.1)
GFR SERPL CREATININE-BSD FRML MDRD: 39 ML/MIN/1.73SQ M
GLUCOSE P FAST SERPL-MCNC: 105 MG/DL (ref 65–99)
HCT VFR BLD AUTO: 35.7 % (ref 36.5–49.3)
HGB BLD-MCNC: 11.2 G/DL (ref 12–17)
IMM GRANULOCYTES # BLD AUTO: 0.02 THOUSAND/UL (ref 0–0.2)
IMM GRANULOCYTES NFR BLD AUTO: 0 % (ref 0–2)
LYMPHOCYTES # BLD AUTO: 0.9 THOUSANDS/ÂΜL (ref 0.6–4.47)
LYMPHOCYTES NFR BLD AUTO: 14 % (ref 14–44)
MCH RBC QN AUTO: 29.9 PG (ref 26.8–34.3)
MCHC RBC AUTO-ENTMCNC: 31.4 G/DL (ref 31.4–37.4)
MCV RBC AUTO: 96 FL (ref 82–98)
MONOCYTES # BLD AUTO: 0.85 THOUSAND/ÂΜL (ref 0.17–1.22)
MONOCYTES NFR BLD AUTO: 13 % (ref 4–12)
NEUTROPHILS # BLD AUTO: 4.72 THOUSANDS/ÂΜL (ref 1.85–7.62)
NEUTS SEG NFR BLD AUTO: 70 % (ref 43–75)
NRBC BLD AUTO-RTO: 0 /100 WBCS
PHOSPHATE SERPL-MCNC: 3.6 MG/DL (ref 2.3–4.1)
PLATELET # BLD AUTO: 242 THOUSANDS/UL (ref 149–390)
PMV BLD AUTO: 11.2 FL (ref 8.9–12.7)
POTASSIUM SERPL-SCNC: 5.1 MMOL/L (ref 3.5–5.3)
PROT UR-MCNC: 28 MG/DL
PROT/CREAT UR: 0.19 MG/G{CREAT} (ref 0–0.1)
PTH-INTACT SERPL-MCNC: 125.9 PG/ML (ref 18.4–80.1)
RBC # BLD AUTO: 3.74 MILLION/UL (ref 3.88–5.62)
SODIUM SERPL-SCNC: 141 MMOL/L (ref 135–147)
WBC # BLD AUTO: 6.69 THOUSAND/UL (ref 4.31–10.16)

## 2023-02-13 ENCOUNTER — TELEPHONE (OUTPATIENT)
Dept: NEPHROLOGY | Facility: CLINIC | Age: 88
End: 2023-02-13

## 2023-02-13 NOTE — TELEPHONE ENCOUNTER
Appointment Confirmation   Person confirmed appointment with  If not patient, name of the person Spouse Kanika   Date and time of appointment 02/14/23   Patient acknowledged and will be at appointment?  yes   Did you advise the patient that they will need a urine sample if they are a new patient? no   Did you advise the patient to bring their current medications for verification? (including any OTC) yes   Additional Information

## 2023-02-14 ENCOUNTER — OFFICE VISIT (OUTPATIENT)
Dept: NEPHROLOGY | Facility: CLINIC | Age: 88
End: 2023-02-14

## 2023-02-14 ENCOUNTER — TELEPHONE (OUTPATIENT)
Dept: FAMILY MEDICINE CLINIC | Facility: CLINIC | Age: 88
End: 2023-02-14

## 2023-02-14 VITALS
WEIGHT: 146 LBS | BODY MASS INDEX: 21.62 KG/M2 | HEIGHT: 69 IN | HEART RATE: 55 BPM | SYSTOLIC BLOOD PRESSURE: 124 MMHG | DIASTOLIC BLOOD PRESSURE: 68 MMHG

## 2023-02-14 DIAGNOSIS — E11.3293 TYPE 2 DIABETES MELLITUS WITH BOTH EYES AFFECTED BY MILD NONPROLIFERATIVE RETINOPATHY WITHOUT MACULAR EDEMA, WITHOUT LONG-TERM CURRENT USE OF INSULIN (HCC): ICD-10-CM

## 2023-02-14 DIAGNOSIS — N18.31 STAGE 3A CHRONIC KIDNEY DISEASE (HCC): ICD-10-CM

## 2023-02-14 DIAGNOSIS — E78.00 PURE HYPERCHOLESTEROLEMIA: ICD-10-CM

## 2023-02-14 DIAGNOSIS — N18.32 STAGE 3B CHRONIC KIDNEY DISEASE (HCC): Primary | ICD-10-CM

## 2023-02-14 DIAGNOSIS — C15.5 PRIMARY ADENOCARCINOMA OF DISTAL THIRD OF ESOPHAGUS (HCC): Primary | ICD-10-CM

## 2023-02-14 DIAGNOSIS — N25.81 SECONDARY HYPERPARATHYROIDISM OF RENAL ORIGIN (HCC): ICD-10-CM

## 2023-02-14 DIAGNOSIS — E55.9 VITAMIN D DEFICIENCY: ICD-10-CM

## 2023-02-14 DIAGNOSIS — I10 ESSENTIAL HYPERTENSION: ICD-10-CM

## 2023-02-14 DIAGNOSIS — C15.5 PRIMARY ADENOCARCINOMA OF DISTAL THIRD OF ESOPHAGUS (HCC): ICD-10-CM

## 2023-02-14 RX ORDER — MULTIVIT WITH MINERALS/LUTEIN
1000 TABLET ORAL DAILY
COMMUNITY
End: 2023-02-14

## 2023-02-14 RX ORDER — AMLODIPINE BESYLATE 5 MG/1
5 TABLET ORAL DAILY
Qty: 90 TABLET | Refills: 3 | Status: SHIPPED | OUTPATIENT
Start: 2023-02-14

## 2023-02-14 RX ORDER — PNV NO.95/FERROUS FUM/FOLIC AC 28MG-0.8MG
TABLET ORAL
COMMUNITY
End: 2023-02-14

## 2023-02-14 RX ORDER — CALCITRIOL 0.25 UG/1
0.25 CAPSULE, LIQUID FILLED ORAL 3 TIMES WEEKLY
Qty: 36 CAPSULE | Refills: 4 | Status: SHIPPED | OUTPATIENT
Start: 2023-02-14

## 2023-02-14 NOTE — PROGRESS NOTES
Nephrology Follow up Consultation  Betty Lewis 82 y o  male MRN: [id number]            BACKGROUND:  Betty Lewis is a 82 y  o male who was referred by Elbert Nayak MD for evaluation of Chronic Kidney Disease and Follow-up    ASSESSMENT / PLAN:   82 y o   male with pmh of multiple co-morbidities including   hyperlipidemia, BPH, hypertension (x 30yrs), DM (x30yrs, +DR), vitamin-D deficiency, GERD and 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) is status post radiation in 2020 and then chemotherapy with carboplatin/paclitaxil with 6 cycles in March of 2020 with subsequent robotic esophagectomy in May 2020 presents to the office for routine follow-up  CKD stage 3B:  - After review of records in [facility] as well as Care everywhere patient has a baseline creatinine of 0 9-1 1 mg/dL up until April 2021 and then since January 2022 creatinine has been around 1 3-1 6 mg/dL  Most recent labs show a Creatinine of 1 57 mg/dL on 1/30/23  Renal function remains stable at baseline     - likely has underlying CKD secondary to age-related nephron loss plus hypertensive nephrosclerosis plus diabetic kidney disease (evidence of retinopathy) plus age-related nephron loss plus repeated IV contrast exposure plus some degree of underestimation of renal function based on current MDRD formula  - CT abdomen with IV contrast from 09/14/2022 showing no hydronephrosis simple solitary cysts on both kidneys  - Acid base and lytes stable, except for borderline elevated potassium levels of 5 1 mEq readvised patient appropriate potassium diet  - Clinically the patient appears to be euvolemic  - Recommend to avoid use of NSAIDs, nephrotoxins  Caution advised with regards to exposure to IV contrast dye    - Discussed with the patient in depth his renal status, including the possible etiologies for CKD     - Advised the patient that when his GFR is close to 20mL/min then will start discussing about RRT(renal replacement therapy) options such as renal transplant, peritoneal dialysis and hemodialysis  - Informed the patient about the various options for Renal Replacement therapy  - Discussed with the patient how we need to work together to delay the progression of CKD with optimal BP control based on their age and co-morbidities, optimal BS control with HbA1c of <7% and trying to reduce proteinuria by the use of anti-proteinuric agents  -Referral to CKD education/kidney smart placed on 2/14/2023  -Advised patient if he is to go for CT scan to avoid IV contrast exposure if he is to get IV contrast exposure then to hold losartan in the a m  of the test continue aggressive p o  hydration and discussed with ordering physician with regards to placing orders for IV fluid hydration with normal saline at 100 cc an hour for 2 hours pre and postprocedure to avoid STEPHEN    Hypertension:  - Patient is on losartan 50 mg p o  q day,Norvasc 5 mg p o  QHS  -No medication changes at this time advised patient to hold losartan if he is to get IV contrast exposure  - Goal BP of <  130/80 based on age and comorbidities  - Instructed to follow low sodium (2gm)diet   - Advised to hold ACEI/ARBs if patient suffers from dehydration due to gastrointestinal losses due to risk of AMOL secondary to failure to autoregulate  Hemoglobin/anemia:  - Goal Hb of 10-12 g/dL  - Most recent labs suggestive of 11 2 grams/deciliter  - on B12 shots, ferrous sulfate  -Continue p o  iron for now    CKD/MBD(Mineral Bone Disease)/vitamin-D deficiency/secondary hyperparathyroidism of renal origin:  - Based on patients CKD stage following is the goal of therapy    - Maintain calcium phosphorus product of < 55   - Stage 3 CKD - Goal Ca 8 5-10 mg/dL , goal Phos 2 7-4 6 mg/dL  , goal iPTH 30-70 pg/mL  - Continue patient on vitamin-D 1000 units p o  q day and start Calcitrol 0 25 mcg 3 times a week  -Most recent vitamin D level 30 8 and intact  9 as of 1/30/2023  - check intact PTH vitamin-D prior to next visit    Proteinuria:  - proteinuria most likely secondary to diabetic kidney disease     - most recent UA from 10/3/2022 no blood trace protein  -Recent protein creatinine ratio 190 mg as of January 2023 stable  - check protein creatinine ratio  - currently on therapy for proteinuria with vitamin-D, Zocor, losartan    Lipids:  - on Zocor  - goal LDL less than 70  - Management as per PCP    DM:  - management as per Primary team  - on metformin  - most recent A1c 6 1% as of 10/20/2022  - advised patient when and if renal parameters continue to deteriorate he may need to be taken off of metformin  - current dosage of 500 mg p o  q day from metformin is okay for his renal function  - positive evidence of mild diabetic retinopathy as of eye exam from 05/06/2021    Adenocarcinoma distal 3rd of esophagus:  - Management as per primary team  - follow-up with Oncology  - follow-up with [facility] surgery at [facility]  - on Prilosec  - discuss implications of long-term PPI usage for CKD in patients with the patient  - needs yearly surveillance scans recommend if possible to avoid IV contrast usage or if it is totally necessary than to hydrate patient and make sure appropriate medications are on hold to avoid nephrotoxicity    BPH:  - management primary team  - on Proscar and Flomax  - follow-up with Urology    Nutrition:  - Encouraged patient to follow a renal diet comprising of moderate potassium, low phosphorus and protein restriction to 0 8gm/kg  - Will check serum albumin with next blood work  Followup:  - Patient is to follow-up in5 months, with lab work to be performed in a few days prior to the next visit  Advised patient to call me in 10 days to review the results if they do not hear back from me, as I may have not received the results      NEEMA GALEANO MD, FASSUSAN, 2/14/2023, 1:03 PM             SUBJECTIVE: 82 y o  male presents to the office for routine follow-up  Recently seen by PCP in November  Has an upcoming appointment with PCP  No NSAID use no recent hospitalization no issues with edema Home blood pressure stable in the 130s  Has been taking Norvasc daily  Has an upcoming CT scan which will be done at [facility] advised patient that the order appears that it will be with IV contrast advised patient to talk to PCP with regards to avoiding IV contrast if possible for your      Review of Systems   Constitutional: Negative for chills and fever  HENT: Negative for congestion and sore throat  Respiratory: Negative for cough, shortness of breath and wheezing  Cardiovascular: Negative for leg swelling  Gastrointestinal: Negative for abdominal pain, diarrhea, nausea and vomiting  Genitourinary: Negative for difficulty urinating, dysuria and hematuria  Musculoskeletal: Negative for back pain  Skin: Negative for wound  Neurological: Negative for dizziness, light-headedness and headaches  Psychiatric/Behavioral: Negative for agitation and confusion  All other systems reviewed and are negative        PAST MEDICAL HISTORY:  Past Medical History:   Diagnosis Date   • AMOL (acute kidney injury) ([place] [address] ) 09/27/2022   • Anemia    • Castellanos's esophagus without dysplasia 2006   • BPH with obstruction/lower urinary tract symptoms    • Cancer ([place] [address] ) 2019   • Chronic GERD 12/27/2017   • Chronic pain disorder     lumbo sacral, pt had surg and pain is improved   • Colon polyp 2006   • Cough    • Diabetes mellitus (HCC)    • Diverticulosis    • Elevated PSA    • Erectile dysfunction    • Esophageal adenocarcinoma ([place] [address] ) 12/2019   • Frequency of micturition    • GERD (gastroesophageal reflux disease)    • Hiatal hernia    • History of Helicobacter pylori infection 2006   • Hyperlipidemia    • Hypertension    • Paroxysmal atrial fibrillation ([place] [address] ) 06/10/2020    Last Assessment & Plan:  - Afib RVR post op day #6 at [facility] s/p thoracoscopy/lympadenectomy rate rhythm control with diltiazem and amiodarone  No AC started  - Amio washout completed, 2 week Zio patch monitor to r/o afib recommended, this was explained in detail with pt and his wife  - Educated on the S/S of atrial fibrillation, TIA, CVA and he is instructed to call for any heart racing or    • Tobacco use disorder 12/22/2010    Last Assessment & Plan:  Pt has been smoking less than 3 pp week not interested in quitting    • Weight loss 06/10/2020    Last Assessment & Plan:  - Unplanned weight loss accompanied by poor appetite and poor po intake since surgery - He has visit with surgeon tomorrow- advised to discuss his symptoms/weight loss - Recommended close follow up with PCP as well         PROBLEM LIST    Patient Active Problem List   Diagnosis   • Type 2 diabetes mellitus with both eyes affected by mild nonproliferative retinopathy without macular edema, without long-term current use of insulin (HCC)   • BPH with urinary obstruction   • Erectile dysfunction   • Essential hypertension   • Hyperlipidemia   • Obstructive sleep apnea   • Frequency of urination   • Lumbar radiculopathy, chronic   • Iron deficiency anemia secondary to inadequate dietary iron intake   • Primary adenocarcinoma of distal third of esophagus (HCC)   • SVT (supraventricular tachycardia) (HCC)   • RBBB   • Vitamin B12 deficiency   • Stage 3b chronic kidney disease ([address] )   • Secondary hyperparathyroidism of renal origin ([address] )   • Vitamin D deficiency       PAST SURGICAL HISTORY:  Past Surgical History:   Procedure Laterality Date   • APPENDECTOMY     • BACK SURGERY     • CATARACT EXTRACTION Bilateral    • COLONOSCOPY  06/2011    diverticulosis   • COLONOSCOPY  11/2015    diverticulosis   • EGD  12/2019    distal esophageal adenocarcinoma   • EGD  05/2021    status post esophagus resection, esophagogastric anastomosis appears normal   Dilated to 50 [place]   • ESOPHAGECTOMY  05/2020    at [facility] following chemoradiation for T3N0M0 adenocarcinoma   • LUMBAR LAMINECTOMY     • MASTOID SURGERY     • NASAL SEPTUM SURGERY     • VASECTOMY         SOCIAL HISTORY :   reports that he quit smoking about 2 years ago  His smoking use included cigarettes  He started smoking about 65 years ago  He has a 60 00 pack-year smoking history  He has never used smokeless tobacco  He reports current alcohol use of about 1 0 standard drink per week  He reports that he does not use drugs  FAMILY HISTORY:  Family History   Problem Relation Age of Onset   • Heart disease Mother    • Hypertension Mother    • Kidney disease Father    • Colon cancer Father    • Cancer Father         Colon   • Leukemia Cousin        ALLERGIES:  No Known Allergies        PHYSICAL EXAM:  Vitals:    02/14/23 1216   BP: 124/68   BP Location: Left arm   Patient Position: Sitting   Cuff Size: Standard   Pulse: 55   Weight: 66 2 kg (146 lb)   Height: 5' 9" (1 753 m)     Body mass index is 21 56 kg/m²  Physical Exam  Vitals reviewed  Constitutional:       General: He is not in acute distress  Appearance: Normal appearance  He is normal weight  He is not ill-appearing, toxic-appearing or diaphoretic  HENT:      Head: Normocephalic and atraumatic  Mouth/Throat:      Mouth: Mucous membranes are moist       Pharynx: Oropharynx is clear  No oropharyngeal exudate  Eyes:      General: No scleral icterus  Conjunctiva/sclera: Conjunctivae normal    Cardiovascular:      Rate and Rhythm: Normal rate  Pulses: Normal pulses  Heart sounds: Normal heart sounds  Pulmonary:      Effort: Pulmonary effort is normal  No respiratory distress  Breath sounds: Normal breath sounds  No stridor  Abdominal:      General: There is no distension  Palpations: Abdomen is soft  There is no mass  Tenderness: There is no abdominal tenderness  There is no right CVA tenderness or left CVA tenderness  Musculoskeletal:         General: No swelling        Cervical back: Normal range of motion  No rigidity  Skin:     General: Skin is warm  Coloration: Skin is not jaundiced  Neurological:      General: No focal deficit present  Mental Status: He is alert and oriented to person, place, and time  Psychiatric:         Mood and Affect: Mood normal          Behavior: Behavior normal          LABORATORY DATA:     Results from last 6 Months   Lab Units 01/30/23  0823 10/20/22  1155 10/03/22  0847 09/16/22  0903 09/12/22  0827   WBC Thousand/uL 6 69 6 29  --   --  5 22   HEMOGLOBIN g/dL 11 2* 11 0*  --   --  10 4*   HEMATOCRIT % 35 7* 34 9*  --   --  33 0*   PLATELETS Thousands/uL 242 285  --   --  226   POTASSIUM mmol/L 5 1 5 1 4 9   < > 4 6   CHLORIDE mmol/L 109* 105 108   < > 109*   CO2 mmol/L 27 25 26   < > 27   BUN mg/dL 22 26* 23   < > 18   CREATININE mg/dL 1 57* 1 54* 1 52*   < > 1 38*   CALCIUM mg/dL 9 3 9 4 9 3   < > 8 9   PHOSPHORUS mg/dL 3 6 3 1 3 7  --   --     < > = values in this interval not displayed          rest all reviewed    RADIOLOGY:  No orders to display     Rest all reviewed        MEDICATIONS:    Current Outpatient Medications:   •  amLODIPine (NORVASC) 5 mg tablet, Take 1 tablet (5 mg total) by mouth daily, Disp: 90 tablet, Rfl: 3  •  calcitriol (ROCALTROL) 0 25 mcg capsule, Take 1 capsule (0 25 mcg total) by mouth 3 (three) times a week, Disp: 36 capsule, Rfl: 4  •  Cholecalciferol 25 MCG (1000 UT) capsule, Take 1,000 Units by mouth daily, Disp: , Rfl:   •  Ferrous Sulfate (FEOSOL PO), Take 1 tablet by mouth daily, Disp: , Rfl:   •  finasteride (PROSCAR) 5 mg tablet, TAKE 1 TABLET BY MOUTH EVERY DAY, Disp: 90 tablet, Rfl: 3  •  losartan (COZAAR) 50 mg tablet, Take 1 tablet (50 mg total) by mouth daily, Disp: 90 tablet, Rfl: 3  •  metFORMIN (GLUCOPHAGE) 500 mg tablet, Take 1 tablet (500 mg total) by mouth daily with breakfast, Disp: 90 tablet, Rfl: 3  •  omeprazole (PriLOSEC) 40 MG capsule, TAKE 1 CAPSULE BY MOUTH TWO TIMES DAILY, Disp: 180 capsule, Rfl: 3  • simvastatin (ZOCOR) 10 mg tablet, TAKE 1 TABLET BY MOUTH FOUR TIMES WEEKLY, Disp: 48 tablet, Rfl: 3  •  tamsulosin (FLOMAX) 0 4 mg, TAKE 1 CAPSULE BY MOUTH EVERY DAY WITH DINNER, Disp: 90 capsule, Rfl: 3    Current Facility-Administered Medications:   •  cyanocobalamin injection 1,000 mcg, 1,000 mcg, Intramuscular, Q30 Days, Trevon Day MD, 1,000 mcg at 01/17/23 1348          Portions of the record may have been created with voice recognition software  Occasional wrong word or "sound a like" substitutions may have occurred due to the inherent limitations of voice recognition software  Read the chart carefully and recognize, using context, where substitutions have occurred  If you have any questions, please contact the dictating provider

## 2023-02-14 NOTE — PATIENT INSTRUCTIONS
- start calcitriol 0 25mcg three times a week  - attend CKD education / kidney smart session    - Please call me in 10 days after having your blood work done to review the results if you do not hear back from me or my office, as I may have not received the results  - please remember to perform blood work prior to the next visit  - Please call if the blood pressure top number is greater than 150 or less than 110 consistently  - Please call if you are gaining more than 2lbs in 2 days for adjustment of water pills   ~ Please AVOID the following pain medications  LIST OF NSAIDS (NONSTEROIDAL ANTI-INFLAMMATORY DRUGS) AND GUZMÁN-2 INHIBITORS    DIFLUNISAL (DOLOBID)  IBUPROFEN (MOTRIN, ADVIL)  FLURBIPROFEN (ANSAID)  KETOPROFEN (ORUDIS, ORUVAIL)  FENOPROFEN (NALFON)  NABUMETONE (RELAFEN)  PIROXICAM (FELDENE)  NAPROXEN (ALEVE, NAPROSYN, NAPRELAN, ANAPROX)  DICLOFENAC (VOLTAREN, CATAFLAM)  INDOMETHACIN (INDOCIN)  SULINDAC (CLINORIL)  TOLMETIN (TOLETIN)  ETODOLAC (LODINE)  MELOXICAM (MOBIC)  KETOROLAC (TORADOL)  OXAPROZIN (DAYPRO)  CELECOXIB (CELEBREX)      Phosphorus diet  Follow a low phosphorus diet      Avoid these higher phosphorus foods: Choose these lower phosphorus foods:   Milk, pudding or yogurt (from animals and from many soy varieties) Rice milk (unfortified), nondairy creamer (if it doesn't have terms in the ingredients list that contain the letters "phos")   Hard cheeses, ricotta or cottage cheese, fat-free cream cheese Regular and low-fat cream cheese   Ice cream or frozen yogurt Sherbet or frozen fruit pops   Soups made with higher phosphorus ingredients (milk, dried peas, beans, lentils) Soups made with lower phosphorus ingredients (broth- or water-based with other lower phosphorus ingredients)   Whole grains, including whole-grain breads, crackers, cereal, rice and pasta Refined grains, including white bread, crackers, cereals, rice and pasta   Quick breads, biscuits, cornbread, muffins, pancakes or waffles Homemade refined (white) dinner rolls, bagels or English muffins   Dried peas (split, black-eyed), beans (black, garbanzo, lima, kidney, navy, calzada) or lentils Green peas (canned, frozen), green beans or wax beans   Organ meats, walleye, pollock or sardines Lean beef, pork, lamb, poultry or other fish   Nuts and seeds Popcorn   Peanut butter and other nut butters Jam, jelly or honey   Chocolate, including chocolate drinks Carob (chocolate-flavored) candy, hard candy or gumdrops   Liset and pepper-type sodas, flavored arellano, bottled teas (if a term in the ingredients list contains the letters "phos") Lemon-lime soda, ginger ale or root beer, plain water   Follow a low potassium diet  Things to do to reduce your blood pressure include working with all your physician to do the following:  ~ stop smoking if you smoke  ~ increase cardiovascular exercise like walking and swimming    ~ modify your diet to decrease fat and salt intake  ~ reduce your weight if you are overweight or obese   ~ increase the consumption of fruits, vegetables and whole grains  ~ decrease alcohol consumption if you consume alcohol    ~ try to minimize stress in your life with lifestyle modifications  ~ be compliant with your anti-hypertensive medications  ~ adjust your medications to help improve your vascular stiffness and decrease risks for heart attacks and strokes

## 2023-02-15 ENCOUNTER — CLINICAL SUPPORT (OUTPATIENT)
Dept: FAMILY MEDICINE CLINIC | Facility: CLINIC | Age: 88
End: 2023-02-15

## 2023-02-15 DIAGNOSIS — E53.8 VITAMIN B12 DEFICIENCY: Primary | ICD-10-CM

## 2023-02-15 RX ADMIN — CYANOCOBALAMIN 1000 MCG: 1000 INJECTION, SOLUTION INTRAMUSCULAR; SUBCUTANEOUS at 13:28

## 2023-02-15 NOTE — TELEPHONE ENCOUNTER
I received a MyChart message from Mrs Jame Whyte, as well as recent consultation with Dr Cynthia Jones, nephrology  Due to CKD, patient's upcoming CT of chest abdomen and pelvis will be ordered with p o  contrast only, not to include IV contrast   Spoke with radiology now  Recommended ordering CT chest abdomen pelvis without contrast, and then add oral contrast in the comment section  Orders will be replaced epic  Mrs Jame Whyte was notified and will call central scheduling to modify the appt

## 2023-02-15 NOTE — PROGRESS NOTES
Monthly B12 given in right deltoid  Patient tolerated injection well and left office in no distress

## 2023-02-16 ENCOUNTER — OFFICE VISIT (OUTPATIENT)
Dept: UROLOGY | Facility: MEDICAL CENTER | Age: 88
End: 2023-02-16

## 2023-02-16 VITALS
SYSTOLIC BLOOD PRESSURE: 120 MMHG | HEIGHT: 69 IN | WEIGHT: 146 LBS | OXYGEN SATURATION: 99 % | BODY MASS INDEX: 21.62 KG/M2 | DIASTOLIC BLOOD PRESSURE: 60 MMHG | HEART RATE: 62 BPM

## 2023-02-16 DIAGNOSIS — N40.1 BENIGN PROSTATIC HYPERPLASIA WITH LOWER URINARY TRACT SYMPTOMS, SYMPTOM DETAILS UNSPECIFIED: Primary | ICD-10-CM

## 2023-02-16 NOTE — PROGRESS NOTES
2/16/2023      Chief Complaint   Patient presents with   • Benign Prostatic Hypertrophy     Assessment and Plan    82 y o  male managed by our office    1  Benign Prostatic Hyperplasia  · Continue on tamsulosin and finasteride-prescription refills not needed at this time  · Continue to monitor for worsening/progression of lower urinary tract symptoms  · Renal function unremarkable on last lab draw  Patient will continue to follow-up with nephrology as scheduled  · Patient is aware to call the office for concerns of worsening symptoms of lower urinary tract symptoms  · Follow-up in the office in 1 year with MOHINI        History of Present Illness  Gabrielle Renteria is a 82 y o  male here for follow up evaluation of urinary symptoms secondary to benign prostatic hyperplasia  Patient has been managed on finasteride and tamsulosin with good control of all lower urinary tract symptoms  He denies dysuria, hematuria, Juanito frequency and urgency  He reports sensation of complete emptying with urination  He denies suprapubic abdominal pain flank pain  He denies scrotal/testicular discomfort  He denies changes to his general health since his last office evaluation  Review of Systems   Constitutional: Negative for chills and fever  Respiratory: Negative for cough and shortness of breath  Cardiovascular: Negative for chest pain  Gastrointestinal: Negative for abdominal distention, abdominal pain, blood in stool, nausea and vomiting  Genitourinary: Negative for difficulty urinating, dysuria, enuresis, flank pain, frequency, hematuria and urgency  Skin: Negative for rash  AUA SYMPTOM SCORE    Flowsheet Row Most Recent Value   AUA SYMPTOM SCORE    How often have you had a sensation of not emptying your bladder completely after you finished urinating? 1   How often have you had to urinate again less than two hours after you finished urinating?  1   How often have you found you stopped and started again several times when you urinate? 0   How often have you found it difficult to postpone urination? 0   How often have you had a weak urinary stream? 1   How often have you had to push or strain to begin urination? 0   How many times did you most typically get up to urinate from the time you went to bed at night until the time you got up in the morning? 3   Quality of Life: If you were to spend the rest of your life with your urinary condition just the way it is now, how would you feel about that? 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   • Frequency of micturition    • GERD (gastroesophageal reflux disease)    • Hiatal hernia    • History of Helicobacter pylori infection 2006   • Hyperlipidemia    • Hypertension    • Paroxysmal atrial fibrillation ([place] [address] ) 06/10/2020    Last Assessment & Plan:  - Afib RVR post op day #6 at [facility] s/p thoracoscopy/lympadenectomy rate rhythm control with diltiazem and amiodarone  No AC started  - Amio washout completed, 2 week Zio patch monitor to r/o afib recommended, this was explained in detail with pt and his wife     - Educated on the S/S of atrial fibrillation, TIA, CVA and he is instructed to call for any heart racing or    • Tobacco use disorder 12/22/2010    Last Assessment & Plan:  Pt has been smoking less than 3 pp week not interested in quitting    • Weight loss 06/10/2020    Last Assessment & Plan:  - Unplanned weight loss accompanied by poor appetite and poor po intake since surgery - He has visit with surgeon tomorrow- advised to discuss his symptoms/weight loss - Recommended close follow up with PCP as well  Past Social History  Past Surgical History:   Procedure Laterality Date   • APPENDECTOMY     • BACK SURGERY     • CATARACT EXTRACTION Bilateral    • COLONOSCOPY  2011    diverticulosis   • COLONOSCOPY  2015    diverticulosis   • EGD  2019    distal esophageal adenocarcinoma   • EGD  2021    status post esophagus resection, esophagogastric anastomosis appears normal   Dilated to 50 [place]   • ESOPHAGECTOMY  2020    at [facility] following chemoradiation for T3N0M0 adenocarcinoma   • LUMBAR LAMINECTOMY     • MASTOID SURGERY     • NASAL SEPTUM SURGERY     • VASECTOMY       Social History     Tobacco Use   Smoking Status Former   • Packs/day: 1 00   • Years: 60 00   • Pack years: 60 00   • Types: Cigarettes   • Start date: 1957   • Quit date: 3/1/2020   • Years since quittin 9   Smokeless Tobacco Never   Tobacco Comments    7 per day       Past Family History  Family History   Problem Relation Age of Onset   • Heart disease Mother    • Hypertension Mother    • Kidney disease Father    • Colon cancer Father    • Cancer Father         Colon   • Leukemia Cousin        Past Social history  Social History     Socioeconomic History   • Marital status: /Civil Union     Spouse name: Not on file   • Number of children: Not on file   • Years of education: Not on file   • Highest education level: Not on file   Occupational History   • Occupation: Retired - Business Owner   Tobacco Use   • Smoking status: Former     Packs/day: 1 00     Years: 60 00     Pack years: 60 00     Types: Cigarettes     Start date: 1957     Quit date: 3/1/2020     Years since quittin 9   • Smokeless tobacco: Never   • Tobacco comments:     7 per day   Substance and Sexual Activity   • Alcohol use:  Yes     Alcohol/week: 1 0 standard drink     Types: 1 Glasses of wine per week     Comment: 1 glass of wine per week   • Drug use: No   • Sexual activity: Not Currently     Partners: Female   Other Topics Concern   • Not on file   Social History Narrative   • Not on file     Social Determinants of Health     Financial Resource Strain: Not on file   Food Insecurity: Not on file   Transportation Needs: Not on file   Physical Activity: Not on file   Stress: Not on file   Social Connections: Not on file   Intimate Partner Violence: Not on file   Housing Stability: Not on file       Current Medications  Current Outpatient Medications   Medication Sig Dispense Refill   • amLODIPine (NORVASC) 5 mg tablet Take 1 tablet (5 mg total) by mouth daily 90 tablet 3   • calcitriol (ROCALTROL) 0 25 mcg capsule Take 1 capsule (0 25 mcg total) by mouth 3 (three) times a week 36 capsule 4   • Cholecalciferol 25 MCG (1000 UT) capsule Take 1,000 Units by mouth daily     • Ferrous Sulfate (FEOSOL PO) Take 1 tablet by mouth daily     • finasteride (PROSCAR) 5 mg tablet TAKE 1 TABLET BY MOUTH EVERY DAY 90 tablet 3   • losartan (COZAAR) 50 mg tablet Take 1 tablet (50 mg total) by mouth daily 90 tablet 3   • metFORMIN (GLUCOPHAGE) 500 mg tablet Take 1 tablet (500 mg total) by mouth daily with breakfast 90 tablet 3   • omeprazole (PriLOSEC) 40 MG capsule TAKE 1 CAPSULE BY MOUTH TWO TIMES DAILY 180 capsule 3   • simvastatin (ZOCOR) 10 mg tablet TAKE 1 TABLET BY MOUTH FOUR TIMES WEEKLY 48 tablet 3   • tamsulosin (FLOMAX) 0 4 mg TAKE 1 CAPSULE BY MOUTH EVERY DAY WITH DINNER 90 capsule 3     Current Facility-Administered Medications   Medication Dose Route Frequency Provider Last Rate Last Admin   • cyanocobalamin injection 1,000 mcg  1,000 mcg Intramuscular Q30 Days Bharti Kidd MD   1,000 mcg at 02/15/23 1328       Allergies  No Known Allergies      The following portions of the patient's history were reviewed and updated as appropriate: allergies, current medications, past medical history, past social history, past surgical history and problem list       Vitals  Vitals:    02/16/23 0923   BP: 120/60   BP Location: Left arm   Patient Position: Sitting   Cuff Size: Large   Pulse: 62   SpO2: 99%   Weight: 66 2 kg (146 lb)   Height: 5' 9" (1 753 m)           Physical Exam  Physical Exam  Vitals reviewed  Constitutional:       General: He is not in acute distress  Appearance: Normal appearance  He is normal weight  HENT:      Head: Normocephalic  Cardiovascular:      Rate and Rhythm: Normal rate  Pulmonary:      Effort: No respiratory distress  Breath sounds: Normal breath sounds  Genitourinary:     Comments: YONNY-prostate 35 to 40 g with no nodules  Skin:     General: Skin is warm and dry  Neurological:      General: No focal deficit present  Mental Status: He is alert and oriented to person, place, and time  Psychiatric:         Mood and Affect: Mood normal          Behavior: Behavior normal            Results  No results found for this or any previous visit (from the past 1 hour(s)) ]  Lab Results   Component Value Date    PSA 0 2 08/13/2020     Lab Results   Component Value Date    CALCIUM 9 3 01/30/2023    K 5 1 01/30/2023    CO2 27 01/30/2023     (H) 01/30/2023    BUN 22 01/30/2023    CREATININE 1 57 (H) 01/30/2023     Lab Results   Component Value Date    WBC 6 69 01/30/2023    HGB 11 2 (L) 01/30/2023    HCT 35 7 (L) 01/30/2023    MCV 96 01/30/2023     01/30/2023           Orders  No orders of the defined types were placed in this encounter        SONDRA Llanes

## 2023-03-02 ENCOUNTER — APPOINTMENT (OUTPATIENT)
Dept: LAB | Facility: MEDICAL CENTER | Age: 88
End: 2023-03-02

## 2023-03-02 DIAGNOSIS — E11.3293 TYPE 2 DIABETES MELLITUS WITH BOTH EYES AFFECTED BY MILD NONPROLIFERATIVE RETINOPATHY WITHOUT MACULAR EDEMA, WITHOUT LONG-TERM CURRENT USE OF INSULIN (HCC): ICD-10-CM

## 2023-03-02 DIAGNOSIS — C15.5 PRIMARY ADENOCARCINOMA OF DISTAL THIRD OF ESOPHAGUS (HCC): ICD-10-CM

## 2023-03-02 DIAGNOSIS — N18.32 STAGE 3B CHRONIC KIDNEY DISEASE (HCC): ICD-10-CM

## 2023-03-02 LAB
ALBUMIN SERPL BCP-MCNC: 3.1 G/DL (ref 3.5–5)
ALP SERPL-CCNC: 87 U/L (ref 46–116)
ALT SERPL W P-5'-P-CCNC: 19 U/L (ref 12–78)
ANION GAP SERPL CALCULATED.3IONS-SCNC: 4 MMOL/L (ref 4–13)
AST SERPL W P-5'-P-CCNC: 13 U/L (ref 5–45)
BILIRUB SERPL-MCNC: 0.66 MG/DL (ref 0.2–1)
BUN SERPL-MCNC: 41 MG/DL (ref 5–25)
CALCIUM ALBUM COR SERPL-MCNC: 9.8 MG/DL (ref 8.3–10.1)
CALCIUM SERPL-MCNC: 9.1 MG/DL (ref 8.3–10.1)
CHLORIDE SERPL-SCNC: 107 MMOL/L (ref 96–108)
CO2 SERPL-SCNC: 27 MMOL/L (ref 21–32)
CREAT SERPL-MCNC: 1.67 MG/DL (ref 0.6–1.3)
GFR SERPL CREATININE-BSD FRML MDRD: 36 ML/MIN/1.73SQ M
GLUCOSE P FAST SERPL-MCNC: 126 MG/DL (ref 65–99)
POTASSIUM SERPL-SCNC: 3.6 MMOL/L (ref 3.5–5.3)
PROT SERPL-MCNC: 6.7 G/DL (ref 6.4–8.4)
SODIUM SERPL-SCNC: 138 MMOL/L (ref 135–147)

## 2023-03-08 ENCOUNTER — OFFICE VISIT (OUTPATIENT)
Dept: FAMILY MEDICINE CLINIC | Facility: CLINIC | Age: 88
End: 2023-03-08

## 2023-03-08 ENCOUNTER — TELEPHONE (OUTPATIENT)
Dept: ADMINISTRATIVE | Facility: OTHER | Age: 88
End: 2023-03-08

## 2023-03-08 VITALS
HEART RATE: 48 BPM | HEIGHT: 69 IN | DIASTOLIC BLOOD PRESSURE: 66 MMHG | WEIGHT: 143.13 LBS | SYSTOLIC BLOOD PRESSURE: 114 MMHG | OXYGEN SATURATION: 97 % | TEMPERATURE: 97.6 F | BODY MASS INDEX: 21.2 KG/M2

## 2023-03-08 DIAGNOSIS — Z00.00 MEDICARE ANNUAL WELLNESS VISIT, SUBSEQUENT: ICD-10-CM

## 2023-03-08 DIAGNOSIS — E11.3293 TYPE 2 DIABETES MELLITUS WITH BOTH EYES AFFECTED BY MILD NONPROLIFERATIVE RETINOPATHY WITHOUT MACULAR EDEMA, WITHOUT LONG-TERM CURRENT USE OF INSULIN (HCC): Primary | ICD-10-CM

## 2023-03-08 DIAGNOSIS — I10 ESSENTIAL HYPERTENSION: ICD-10-CM

## 2023-03-08 DIAGNOSIS — D49.2 NEOPLASM OF SKIN: ICD-10-CM

## 2023-03-08 DIAGNOSIS — C15.5 PRIMARY ADENOCARCINOMA OF DISTAL THIRD OF ESOPHAGUS (HCC): ICD-10-CM

## 2023-03-08 DIAGNOSIS — D50.8 IRON DEFICIENCY ANEMIA SECONDARY TO INADEQUATE DIETARY IRON INTAKE: ICD-10-CM

## 2023-03-08 DIAGNOSIS — E53.8 VITAMIN B12 DEFICIENCY: ICD-10-CM

## 2023-03-08 LAB — SL AMB POCT HEMOGLOBIN AIC: 6.2 (ref ?–6.5)

## 2023-03-08 RX ADMIN — CYANOCOBALAMIN 1000 MCG: 1000 INJECTION, SOLUTION INTRAMUSCULAR; SUBCUTANEOUS at 11:32

## 2023-03-08 NOTE — LETTER
Diabetic Eye Exam Form    Date Requested: 23  Patient: Kayleen Tavarez  Patient : [id number]   Referring Provider: Omar Luevano MD      DIABETIC Eye Exam Date _______________________________      Type of Exam MUST be documented for Diabetic Eye Exams  Please CHECK ONE  Retinal Exam       Dilated Retinal Exam       OCT       Optomap-Iris Exam      Fundus Photography       Left Eye - Please check Retinopathy or No Retinopathy        Exam did show retinopathy    Exam did not show retinopathy       Right Eye - Please check Retinopathy or No Retinopathy       Exam did show retinopathy    Exam did not show retinopathy       Comments __________________________________________________________    Practice Providing Exam ______________________________________________    Exam Performed By (print name) _______________________________________      Provider Signature ___________________________________________________      These reports are needed for  compliance  Please fax this completed form and a copy of the Diabetic Eye Exam report to our office located at [place] [address] as soon as possible via Fax [phone] rosemarie Green: Phone [phone]  We thank you for your assistance in treating our mutual patient

## 2023-03-08 NOTE — PROGRESS NOTES
Assessment and Plan:     Mr Mark Hawkins is a pleasant and animated 62-year-old male who presents with his wife today to review chronic medical conditions  He had a cold/viral infection last week with decreased appetite, but is starting to feel better now  He has recent blood work to review  He is also due for an AWV today  Today, he requests that I check his ears for cerumen impaction  His wife is also concerned about an neoplasm on his scalp above his forehead  Patient states that he keeps bumping it with the formation of a scab  He has been to advanced dermatology in the past     1   Diabetes mellitus -hemoglobin A1c is stable at 6 2  His weight is stable  Continue metformin 500 mg daily  Continue to watch creatinine in the setting of CKD  He is followed by Dr Rousseau Court  It would be reasonable to stop his metformin if his renal function worsens  Last creatinine is 1 67 with GFR 36  He remains on losartan for renal prophylaxis and I will defer to nephrology in regards to continuing this medication  He has an upcoming surveillance CAT scan which is ordered with oral contrast but no IV contrast     2   Adeno CA of the esophagus -upcoming surveillance CAT scan  [facility]  3   Iron deficiency anemia secondary to decreased absorption status post surgery -continue iron supplements daily  Hemoglobin remained stable  4   Hypertension -blood pressures well controlled  Continue losartan and amlodipine  5   Vitamin B12 deficiency -continues to receive IM cyanocobalamin mean at the office monthly  6   Neoplasm of scalp -because the lesion is close to 1 cm and not healing, refer to dermatology for possible biopsy and further evaluation  It does not have suspicious features at this time but due to size, may warrant biopsy  7   AWV -Labs up-to-date  Immunizations reviewed  Advance directives completed  Follow-up 3 to 4 months    Problem List Items Addressed This Visit Digestive    Primary adenocarcinoma of distal third of esophagus ([facility] [address] )       Endocrine    Type 2 diabetes mellitus with both eyes affected by mild nonproliferative retinopathy without macular edema, without long-term current use of insulin (HCC) - Primary       Cardiovascular and Mediastinum    Essential hypertension       Other    Iron deficiency anemia secondary to inadequate dietary iron intake    Vitamin B12 deficiency        Preventive health issues were discussed with patient, and age appropriate screening tests were ordered as noted in patient's After Visit Summary  Personalized health advice and appropriate referrals for health education or preventive services given if needed, as noted in patient's After Visit Summary  History of Present Illness:     Patient presents for a Medicare Wellness Visit    AWV  Check ear for wax  Lesion on scalp - keeps bumping it and it keeps scabbing  Had a cold last week with decreased appetite  Feeling better now  Patient Care Team:  Dee Muse MD as PCP - General (Family Medicine)  MD Meagan Whiteside MD (Nephrology)     Review of Systems:     Review of Systems   Constitutional:        See HPI   HENT: Negative for congestion, ear pain, mouth sores, sinus pressure and trouble swallowing  See HPI   Eyes: Negative for discharge, redness and itching  Respiratory: Negative for apnea, cough, chest tightness, shortness of breath, wheezing and stridor  Cardiovascular: Negative for chest pain, palpitations and leg swelling  Gastrointestinal: Negative for abdominal distention, abdominal pain, blood in stool, constipation, diarrhea, nausea and vomiting  Endocrine: Negative for cold intolerance and heat intolerance  Genitourinary: Negative for difficulty urinating, dysuria, flank pain and urgency  Musculoskeletal: Negative for arthralgias and myalgias  Skin: Negative for rash          See HPI   Neurological: Negative for dizziness, seizures, syncope, speech difficulty, weakness, light-headedness, numbness and headaches  Hematological: Negative for adenopathy  Psychiatric/Behavioral: Negative for agitation, behavioral problems, confusion and sleep disturbance  The patient is not nervous/anxious           Problem List:     Patient Active Problem List   Diagnosis   • Type 2 diabetes mellitus with both eyes affected by mild nonproliferative retinopathy without macular edema, without long-term current use of insulin (HCC)   • BPH with urinary obstruction   • Erectile dysfunction   • Essential hypertension   • Hyperlipidemia   • Obstructive sleep apnea   • Frequency of urination   • Lumbar radiculopathy, chronic   • Iron deficiency anemia secondary to inadequate dietary iron intake   • Primary adenocarcinoma of distal third of esophagus (HCC)   • SVT (supraventricular tachycardia) ([facility])   • RBBB   • Vitamin B12 deficiency   • Stage [facility]  )   • Secondary hyperparathyroidism of renal origin ([place] [address] )   • Vitamin D deficiency      Past Medical and Surgical History:     Past Medical History:   Diagnosis Date   • AMOL (acute kidney injury) ([place] [address] ) 09/27/2022   • Anemia    • Castellanos's esophagus without dysplasia 2006   • BPH with obstruction/lower urinary tract symptoms    • Cancer ([place] [address] ) 2019   • Chronic GERD 12/27/2017   • Chronic pain disorder     lumbo sacral, pt had surg and pain is improved   • Colon polyp 2006   • Cough    • Diabetes mellitus (HCC)    • Diverticulosis    • Elevated PSA    • Erectile dysfunction    • Esophageal adenocarcinoma ([place] [address] ) 12/2019   • Frequency of micturition    • GERD (gastroesophageal reflux disease)    • Hiatal hernia    • History of Helicobacter pylori infection 2006   • Hyperlipidemia    • Hypertension    • Paroxysmal atrial fibrillation ([place] [address] ) 06/10/2020    Last Assessment & Plan:  - Afib RVR post op day #6 at [facility] s/p thoracoscopy/lympadenectomy rate rhythm control with diltiazem and amiodarone  No AC started  - Amio washout completed, 2 week Zio patch monitor to r/o afib recommended, this was explained in detail with pt and his wife  - Educated on the S/S of atrial fibrillation, TIA, CVA and he is instructed to call for any heart racing or    • Tobacco use disorder 12/22/2010    Last Assessment & Plan:  Pt has been smoking less than 3 pp week not interested in quitting    • Weight loss 06/10/2020    Last Assessment & Plan:  - Unplanned weight loss accompanied by poor appetite and poor po intake since surgery - He has visit with surgeon tomorrow- advised to discuss his symptoms/weight loss - Recommended close follow up with PCP as well       Past Surgical History:   Procedure Laterality Date   • APPENDECTOMY     • BACK SURGERY     • CATARACT EXTRACTION Bilateral    • COLONOSCOPY  06/2011    diverticulosis   • COLONOSCOPY  11/2015    diverticulosis   • EGD  12/2019    distal esophageal adenocarcinoma   • EGD  05/2021    status post esophagus resection, esophagogastric anastomosis appears normal   Dilated to 50 Sinhala   • ESOPHAGECTOMY  05/2020    at [facility] following chemoradiation for T3N0M0 adenocarcinoma   • LUMBAR LAMINECTOMY     • MASTOID SURGERY     • NASAL SEPTUM SURGERY     • VASECTOMY        Family History:     Family History   Problem Relation Age of Onset   • Heart disease Mother    • Hypertension Mother    • Kidney disease Father    • Colon cancer Father    • Cancer Father         Colon   • Leukemia Cousin       Social History:     Social History     Socioeconomic History   • Marital status: /Civil Union     Spouse name: Not on file   • Number of children: Not on file   • Years of education: Not on file   • Highest education level: Not on file   Occupational History   • Occupation: Retired - Business Owner   Tobacco Use   • Smoking status: Former     Packs/day: 1 00     Years: 60 00     Pack years: 60 00     Types: Cigarettes     Start date: 7/6/1957     Quit date: 3/1/2020 Years since quitting: 3 0   • Smokeless tobacco: Never   • Tobacco comments:     7 per day   Substance and Sexual Activity   • Alcohol use:  Yes     Alcohol/week: 1 0 standard drink     Types: 1 Glasses of wine per week     Comment: 1 glass of wine per week   • Drug use: No   • Sexual activity: Not Currently     Partners: Female   Other Topics Concern   • Not on file   Social History Narrative   • Not on file     Social Determinants of Health     Financial Resource Strain: Not on file   Food Insecurity: Not on file   Transportation Needs: Not on file   Physical Activity: Not on file   Stress: Not on file   Social Connections: Not on file   Intimate Partner Violence: Not on file   Housing Stability: Not on file      Medications and Allergies:     Current Outpatient Medications   Medication Sig Dispense Refill   • amLODIPine (NORVASC) 5 mg tablet Take 1 tablet (5 mg total) by mouth daily 90 tablet 3   • calcitriol (ROCALTROL) 0 25 mcg capsule Take 1 capsule (0 25 mcg total) by mouth 3 (three) times a week 36 capsule 4   • Cholecalciferol 25 MCG (1000 UT) capsule Take 1,000 Units by mouth daily     • Ferrous Sulfate (FEOSOL PO) Take 1 tablet by mouth daily     • finasteride (PROSCAR) 5 mg tablet TAKE 1 TABLET BY MOUTH EVERY DAY 90 tablet 3   • losartan (COZAAR) 50 mg tablet Take 1 tablet (50 mg total) by mouth daily 90 tablet 3   • metFORMIN (GLUCOPHAGE) 500 mg tablet Take 1 tablet (500 mg total) by mouth daily with breakfast 90 tablet 3   • omeprazole (PriLOSEC) 40 MG capsule TAKE 1 CAPSULE BY MOUTH TWO TIMES DAILY 180 capsule 3   • simvastatin (ZOCOR) 10 mg tablet TAKE 1 TABLET BY MOUTH FOUR TIMES WEEKLY 48 tablet 3   • tamsulosin (FLOMAX) 0 4 mg TAKE 1 CAPSULE BY MOUTH EVERY DAY WITH DINNER 90 capsule 3     Current Facility-Administered Medications   Medication Dose Route Frequency Provider Last Rate Last Admin   • cyanocobalamin injection 1,000 mcg  1,000 mcg Intramuscular Q30 Days Des Noe MD   1,000 mcg at 02/15/23 1328     No Known Allergies   Immunizations:     Immunization History   Administered Date(s) Administered   • COVID-19 MODERNA VACC 0 5 ML IM 01/11/2021, 02/08/2021, 08/27/2021, 04/28/2022   • COVID-19 Moderna Vac BIVALENT 12 Yr+ IM (BOOSTER ONLY) 0 5 ML 12/02/2022   • INFLUENZA 12/27/2011, 09/23/2013, 10/29/2014, 10/01/2015, 11/17/2016, 10/17/2017, 10/30/2018, 10/21/2019   • Influenza Split High Dose Preservative Free IM 10/01/2015, 11/17/2016, 10/17/2017, 10/30/2018   • Influenza, high dose seasonal 0 7 mL 09/15/2020   • Pneumococcal Conjugate 13-Valent 04/07/2016   • Pneumococcal Polysaccharide PPV23 12/22/2010   • Tdap 04/07/2011      Health Maintenance: There are no preventive care reminders to display for this patient  Topic Date Due   • Influenza Vaccine (1) 09/01/2022      Medicare Screening Tests and Risk Assessments:         Health Risk Assessment:   Patient rates overall health as very good  Patient feels that their physical health rating is slightly better  Patient is satisfied with their life  Eyesight was rated as same  Hearing was rated as same  Patient feels that their emotional and mental health rating is slightly better  Patients states they are never, rarely angry  Patient states they are sometimes unusually tired/fatigued  Pain experienced in the last 7 days has been none  Patient states that he has experienced no weight loss or gain in last 6 months  Depression Screening:   PHQ-2 Score: 0      Fall Risk Screening: In the past year, patient has experienced: no history of falling in past year      Home Safety:  Patient does not have trouble with stairs inside or outside of their home  Patient has working smoke alarms and has working carbon monoxide detector  Home safety hazards include: none  Nutrition:   Current diet is Regular  Medications:   Patient is currently taking over-the-counter supplements   OTC medications include: see medication list  Patient is able to manage medications  Activities of Daily Living (ADLs)/Instrumental Activities of Daily Living (IADLs):   Walk and transfer into and out of bed and chair?: Yes  Dress and groom yourself?: Yes    Bathe or shower yourself?: Yes    Feed yourself? Yes  Do your laundry/housekeeping?: Yes  Manage your money, pay your bills and track your expenses?: Yes  Make your own meals?: Yes    Do your own shopping?: Yes    Previous Hospitalizations:   Any hospitalizations or ED visits within the last 12 months?: No      Advance Care Planning:   Living will: Yes    Durable POA for healthcare: Yes    Advanced directive: Yes    Advanced directive counseling given: Yes    Five wishes given: Yes      Cognitive Screening:   Provider or family/friend/caregiver concerned regarding cognition?: No    PREVENTIVE SCREENINGS      Cardiovascular Screening:    General: Screening Not Indicated and History Lipid Disorder      Diabetes Screening:     General: Screening Not Indicated and History Diabetes      Colorectal Cancer Screening:     General: Screening Not Indicated      Prostate Cancer Screening:    General: Screening Not Indicated      Osteoporosis Screening:    General: Screening Not Indicated      Abdominal Aortic Aneurysm (AAA) Screening:    Risk factors include: tobacco use        Lung Cancer Screening:     General: Screening Not Indicated      Hepatitis C Screening:    General: Screening Not Indicated    Screening, Brief Intervention, and Referral to Treatment (SBIRT)    Screening  Typical number of drinks in a day: 0  Typical number of drinks in a week: 1  Interpretation: Low risk drinking behavior  Single Item Drug Screening:  How often have you used an illegal drug (including marijuana) or a prescription medication for non-medical reasons in the past year? never    Single Item Drug Screen Score: 0  Interpretation: Negative screen for possible drug use disorder    No results found       Physical Exam:     There were no vitals taken for this visit  Physical Exam  Vitals and nursing note reviewed  Constitutional:       General: He is not in acute distress  Appearance: Normal appearance  He is normal weight  He is not ill-appearing  HENT:      Right Ear: Tympanic membrane and ear canal normal  There is no impacted cerumen  Left Ear: Tympanic membrane and ear canal normal  There is no impacted cerumen  Ears:      Comments: Minimal cerumen bilaterally  Eyes:      Conjunctiva/sclera: Conjunctivae normal    Neck:      Vascular: No carotid bruit  Cardiovascular:      Rate and Rhythm: Normal rate and regular rhythm  Heart sounds: No murmur heard  No friction rub  No gallop  Pulmonary:      Effort: Pulmonary effort is normal  No respiratory distress  Breath sounds: No stridor  No wheezing, rhonchi or rales  Musculoskeletal:      Cervical back: Normal range of motion  Right lower leg: No edema  Left lower leg: No edema  Skin:     General: Skin is warm  Coloration: Skin is not jaundiced  Comments: At the top of his scalp above his forehead is a light brown, reddish flat area that is 1 cm in diameter  Homogeneous in color  No oozing or exudates  Neurological:      Mental Status: He is alert  Psychiatric:         Mood and Affect: Mood normal          Behavior: Behavior normal          Thought Content:  Thought content normal          Judgment: Judgment normal           Bharti Kidd MD

## 2023-03-08 NOTE — TELEPHONE ENCOUNTER
Upon review of the In Basket request and the patient's chart, initial outreach has been made via fax to facility  Please see Contacts section for details       Thank you  Meenu Kuo MA

## 2023-03-08 NOTE — TELEPHONE ENCOUNTER
Upon review of the In Basket request we were able to locate, review, and update the patient chart as requested for Diabetic Eye Exam     Any additional questions or concerns should be emailed to the Practice Liaisons via the appropriate education email address, please do not reply via In Basket      Thank you  Noemí Driscoll MA

## 2023-03-08 NOTE — TELEPHONE ENCOUNTER
----- Message from Pavithra Terry sent at 3/8/2023 10:49 AM EST -----  Regarding: diabetic eye exam  03/08/23 10:49 AM    Hello, our patient Monica Hirsch has had Diabetic Eye Exam completed/performed  Please assist in updating the patient chart by making an External outreach to Dr Mica Vines facility located in [place], [place]  The date of service is 2/2023      Thank you,  Pavithra Terry  [facility]

## 2023-03-21 ENCOUNTER — HOSPITAL ENCOUNTER (OUTPATIENT)
Dept: CT IMAGING | Facility: HOSPITAL | Age: 88
Discharge: HOME | End: 2023-03-21

## 2023-03-21 DIAGNOSIS — C15.5 PRIMARY ADENOCARCINOMA OF DISTAL THIRD OF ESOPHAGUS (HCC): ICD-10-CM

## 2023-04-03 ENCOUNTER — TELEPHONE (OUTPATIENT)
Dept: FAMILY MEDICINE CLINIC | Facility: CLINIC | Age: 88
End: 2023-04-03

## 2023-04-03 NOTE — TELEPHONE ENCOUNTER
----- Message from Ej Ren MD sent at 4/2/2023 10:26 AM EDT -----  Please notify Mr Pratt Late that his recent CT looks good and there is no sign of recurrence of previous esophageal cancer  His gallbladder was slightly inflamed - is he having any symptoms of pain or nausea?    Thank you,  CB

## 2023-04-25 DIAGNOSIS — E11.9 TYPE 2 DIABETES MELLITUS WITHOUT COMPLICATION, WITHOUT LONG-TERM CURRENT USE OF INSULIN (HCC): ICD-10-CM

## 2023-05-11 ENCOUNTER — HOSPITAL ENCOUNTER (OUTPATIENT)
Dept: ULTRASOUND IMAGING | Facility: HOSPITAL | Age: 88
Discharge: HOME | End: 2023-05-11
Attending: INTERNAL MEDICINE

## 2023-05-11 DIAGNOSIS — R93.2 ABNORMAL CT SCAN, GALLBLADDER: ICD-10-CM

## 2023-05-16 ENCOUNTER — CLINICAL SUPPORT (OUTPATIENT)
Dept: FAMILY MEDICINE CLINIC | Facility: CLINIC | Age: 88
End: 2023-05-16

## 2023-05-16 DIAGNOSIS — E53.8 VITAMIN B12 DEFICIENCY: Primary | ICD-10-CM

## 2023-05-16 RX ADMIN — CYANOCOBALAMIN 1000 MCG: 1000 INJECTION, SOLUTION INTRAMUSCULAR; SUBCUTANEOUS at 13:48

## 2023-05-16 NOTE — PROGRESS NOTES
Pt presents in office for b12 injection given to right delt, pt tolerated well  Advised to schedule next at check out

## 2023-05-25 DIAGNOSIS — N13.8 BPH WITH URINARY OBSTRUCTION: ICD-10-CM

## 2023-05-25 DIAGNOSIS — N40.1 BPH WITH URINARY OBSTRUCTION: ICD-10-CM

## 2023-05-25 RX ORDER — FINASTERIDE 5 MG/1
TABLET, FILM COATED ORAL
Qty: 90 TABLET | Refills: 3 | Status: SHIPPED | OUTPATIENT
Start: 2023-05-25

## 2023-06-02 ENCOUNTER — APPOINTMENT (OUTPATIENT)
Dept: LAB | Facility: MEDICAL CENTER | Age: 88
End: 2023-06-02
Payer: COMMERCIAL

## 2023-06-02 DIAGNOSIS — N18.32 STAGE 3B CHRONIC KIDNEY DISEASE (HCC): ICD-10-CM

## 2023-06-02 DIAGNOSIS — E55.9 VITAMIN D DEFICIENCY: ICD-10-CM

## 2023-06-02 DIAGNOSIS — N25.81 SECONDARY HYPERPARATHYROIDISM OF RENAL ORIGIN (HCC): ICD-10-CM

## 2023-06-02 DIAGNOSIS — E78.00 PURE HYPERCHOLESTEROLEMIA: ICD-10-CM

## 2023-06-02 DIAGNOSIS — C15.5 PRIMARY ADENOCARCINOMA OF DISTAL THIRD OF ESOPHAGUS (HCC): ICD-10-CM

## 2023-06-02 DIAGNOSIS — I10 ESSENTIAL HYPERTENSION: ICD-10-CM

## 2023-06-02 DIAGNOSIS — E11.3293 TYPE 2 DIABETES MELLITUS WITH BOTH EYES AFFECTED BY MILD NONPROLIFERATIVE RETINOPATHY WITHOUT MACULAR EDEMA, WITHOUT LONG-TERM CURRENT USE OF INSULIN (HCC): ICD-10-CM

## 2023-06-02 LAB
25(OH)D3 SERPL-MCNC: 49.9 NG/ML (ref 30–100)
ALBUMIN SERPL BCP-MCNC: 3.6 G/DL (ref 3.5–5)
ANION GAP SERPL CALCULATED.3IONS-SCNC: 2 MMOL/L (ref 4–13)
BUN SERPL-MCNC: 22 MG/DL (ref 5–25)
CALCIUM SERPL-MCNC: 9.2 MG/DL (ref 8.3–10.1)
CHLORIDE SERPL-SCNC: 109 MMOL/L (ref 96–108)
CO2 SERPL-SCNC: 26 MMOL/L (ref 21–32)
CREAT SERPL-MCNC: 1.58 MG/DL (ref 0.6–1.3)
CREAT UR-MCNC: 193 MG/DL
GFR SERPL CREATININE-BSD FRML MDRD: 39 ML/MIN/1.73SQ M
GLUCOSE P FAST SERPL-MCNC: 106 MG/DL (ref 65–99)
MICROALBUMIN UR-MCNC: 22.1 MG/L (ref 0–20)
MICROALBUMIN/CREAT 24H UR: 11 MG/G CREATININE (ref 0–30)
PHOSPHATE SERPL-MCNC: 3.5 MG/DL (ref 2.3–4.1)
POTASSIUM SERPL-SCNC: 4.4 MMOL/L (ref 3.5–5.3)
PTH-INTACT SERPL-MCNC: 78.5 PG/ML (ref 12–88)
SODIUM SERPL-SCNC: 137 MMOL/L (ref 135–147)

## 2023-06-02 PROCEDURE — 82570 ASSAY OF URINE CREATININE: CPT

## 2023-06-02 PROCEDURE — 83970 ASSAY OF PARATHORMONE: CPT

## 2023-06-02 PROCEDURE — 80069 RENAL FUNCTION PANEL: CPT

## 2023-06-02 PROCEDURE — 82043 UR ALBUMIN QUANTITATIVE: CPT

## 2023-06-02 PROCEDURE — 82306 VITAMIN D 25 HYDROXY: CPT

## 2023-06-02 PROCEDURE — 36415 COLL VENOUS BLD VENIPUNCTURE: CPT

## 2023-06-15 ENCOUNTER — CLINICAL SUPPORT (OUTPATIENT)
Dept: FAMILY MEDICINE CLINIC | Facility: CLINIC | Age: 88
End: 2023-06-15
Payer: COMMERCIAL

## 2023-06-15 DIAGNOSIS — E53.8 VITAMIN B12 DEFICIENCY: Primary | ICD-10-CM

## 2023-06-15 PROCEDURE — 96372 THER/PROPH/DIAG INJ SC/IM: CPT

## 2023-06-15 RX ADMIN — CYANOCOBALAMIN 1000 MCG: 1000 INJECTION, SOLUTION INTRAMUSCULAR; SUBCUTANEOUS at 13:46

## 2023-06-26 ENCOUNTER — OFFICE VISIT (OUTPATIENT)
Dept: NEPHROLOGY | Facility: CLINIC | Age: 88
End: 2023-06-26
Payer: COMMERCIAL

## 2023-06-26 VITALS
BODY MASS INDEX: 21.18 KG/M2 | DIASTOLIC BLOOD PRESSURE: 72 MMHG | SYSTOLIC BLOOD PRESSURE: 102 MMHG | HEIGHT: 69 IN | WEIGHT: 143 LBS

## 2023-06-26 DIAGNOSIS — E11.3293 TYPE 2 DIABETES MELLITUS WITH BOTH EYES AFFECTED BY MILD NONPROLIFERATIVE RETINOPATHY WITHOUT MACULAR EDEMA, WITHOUT LONG-TERM CURRENT USE OF INSULIN (HCC): ICD-10-CM

## 2023-06-26 DIAGNOSIS — I10 ESSENTIAL HYPERTENSION: ICD-10-CM

## 2023-06-26 DIAGNOSIS — E55.9 VITAMIN D DEFICIENCY: ICD-10-CM

## 2023-06-26 DIAGNOSIS — N25.81 SECONDARY HYPERPARATHYROIDISM OF RENAL ORIGIN (HCC): ICD-10-CM

## 2023-06-26 DIAGNOSIS — N18.32 STAGE 3B CHRONIC KIDNEY DISEASE (HCC): Primary | ICD-10-CM

## 2023-06-26 DIAGNOSIS — E78.00 PURE HYPERCHOLESTEROLEMIA: ICD-10-CM

## 2023-06-26 PROCEDURE — 99214 OFFICE O/P EST MOD 30 MIN: CPT | Performed by: INTERNAL MEDICINE

## 2023-06-26 NOTE — PROGRESS NOTES
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) is status post radiation in 2020 and then chemotherapy with carboplatin/paclitaxil with 6 cycles in March of 2020 with subsequent robotic esophagectomy in May 2020 presents to the office for routine follow-up  CKD stage 3B:  - After review of records in [facility] as well as Care everywhere patient had a baseline creatinine of 0 9-1 1 mg/dL up until April 2021 and then since January 2022 creatinine has been around 1 3-1 6 mg/dL  Most recent labs show a Creatinine of 1 58 mg/dL on 6/2/23  Renal function remains stable at baseline     - likely has underlying CKD secondary to age-related nephron loss plus hypertensive nephrosclerosis plus diabetic kidney disease (evidence of retinopathy) plus age-related nephron loss plus repeated IV contrast exposure plus some degree of underestimation of renal function based on current MDRD formula  - CT abdomen with IV contrast from 09/14/2022 showing no hydronephrosis simple solitary cysts on both kidneys  - Acid base and lytes stable, borderline hyperkalemia resolved  - Clinically the patient appears to be euvolemic  - Recommend to avoid use of NSAIDs, nephrotoxins  Caution advised with regards to exposure to IV contrast dye    - Discussed with the patient in depth his renal status, including the possible etiologies for CKD     - Advised the patient that when his GFR is close to 20mL/min then will start discussing about RRT(renal replacement therapy) options such as renal transplant, peritoneal dialysis and hemodialysis  - Informed the patient about the various options for Renal Replacement therapy  - Discussed with the patient how we need to work together to delay the progression of CKD with optimal BP control based on their age and co-morbidities, optimal BS control with HbA1c of <7% and trying to reduce proteinuria by the use of anti-proteinuric agents  -Referral to CKD education/kidney smart placed on 2/14/2023, patient later changed his mind and wishes to hold off for now  Respect his decision  Currently going through a lot of issues and changes in home relocation we will revisit once stable    Hypertension:  - Patient is on losartan 50 mg p o  q day, Norvasc 5 mg p o  QHS  -Advised patient to check blood pressures at home and if SBP consistently less than 110 then decrease Norvasc to 2 5 mg once a day  Call with updates  - Goal BP of <  130/80 based on age and comorbidities  - Instructed to follow low sodium (2gm)diet   - Advised to hold ACEI/ARBs if patient suffers from dehydration due to gastrointestinal losses due to risk of AMOL secondary to failure to autoregulate  Hemoglobin/anemia:  - Goal Hb of 10-12 g/dL  - Most recent labs suggestive of 11 2 grams/deciliter  - on B12 shots, ferrous sulfate, no changes for now  -Continue p o  iron for now    CKD/MBD(Mineral Bone Disease)/vitamin-D deficiency/secondary hyperparathyroidism of renal origin:  - Based on patients CKD stage following is the goal of therapy  - Maintain calcium phosphorus product of < 55   - Stage 3 CKD - Goal Ca 8 5-10 mg/dL , goal Phos 2 7-4 6 mg/dL  , goal iPTH 30-70 pg/mL  - Continue patient on vitamin-D 1000 units p o  q day and continue Calcitrol 0 25 mcg 3 times a week    Improving  -Most recent vitamin D level 49 9 and intact PTH 78 5 as of 6/2/2023  - check intact PTH vitamin-D prior to next visit    Proteinuria:  - proteinuria most likely secondary to diabetic kidney disease     - most recent UA from 10/3/2022 no blood trace protein  -Recent protein creatinine ratio 190 mg as of January 2023 stable  -Most recent micro and creatinine ratio 11 mg/dL as of June 2023  - check protein creatinine ratio  - currently on therapy for proteinuria with vitamin-D, Zocor, losartan    Lipids:  - on Zocor  - goal LDL less than 70  - Management as per PCP    DM:  - management as per Primary team  - on metformin  - most recent A1c 6 2% as of March 2023  - advised patient when and if renal parameters continue to deteriorate he may need to be taken off of metformin  - current dosage of 500 mg p o  q day from metformin is okay for his renal function  - positive evidence of mild diabetic retinopathy as of eye exam from 05/06/2021    Adenocarcinoma distal 3rd of esophagus:  - Management as per primary team  - follow-up with Oncology, last seen 3/31/2023 notes reviewed  - follow-up with [facility] surgery at [facility]  - on Prilosec  - discuss implications of long-term PPI usage for CKD in patients with the patient  - needs yearly surveillance scans recommend if possible to avoid IV contrast usage or if it is totally necessary than to hydrate patient and make sure appropriate medications are on hold to avoid nephrotoxicity    BPH:  - management primary team  - on Proscar and Flomax  - follow-up with Urology    Nutrition:  - Encouraged patient to follow a renal diet comprising of moderate potassium, low phosphorus and protein restriction to 0 8gm/kg  - Will check serum albumin with next blood work  Followup:  - Patient is to follow-up in 6 months, with lab work to be performed in a few days prior to the next visit  Advised patient to call me in 10 days to review the results if they do not hear back from me, as I may have not received the results  Woodrow Lebron MD, FASN, 6/26/2023, 3:08 PM             SUBJECTIVE: 82 y o  male presents to the office for routine follow-up    Presents with spouse feels well has no complaints no recent hospitalization no issues with edema not checking blood pressures at home recently  Feels well unable to attend CKD education due to current issues with housing is in the process of relocating  Has an upcoming appointment with Dr Jennifer Blas for endoscopy  Wishes to pursue CKD education at a later date  No NSAID use thankful for the care information that they have gotten today happy renal parameters are stable and no changes to medications  Review of Systems   Constitutional: Negative for chills and fever  HENT: Negative for congestion and sore throat  Respiratory: Negative for cough and shortness of breath  Cardiovascular: Negative for leg swelling  Gastrointestinal: Negative for diarrhea and vomiting  Genitourinary: Negative for difficulty urinating, dysuria and hematuria  Musculoskeletal: Negative for back pain  Skin: Negative for wound  Neurological: Negative for dizziness, light-headedness and headaches  Psychiatric/Behavioral: Negative for agitation and confusion  All other systems reviewed and are negative        PAST MEDICAL HISTORY:  Past Medical History:   Diagnosis Date   • AMOL (acute kidney injury) ([place] [id number] ) 09/27/2022   • Anemia    • Castellanos's esophagus without dysplasia 2006   • BPH with obstruction/lower urinary tract symptoms    • Cancer ([place] [address] ) 2019   • Chronic GERD 12/27/2017   • Chronic pain disorder     lumbo sacral, pt had surg and pain is improved   • Colon polyp 2006   • Cough    • Diabetes mellitus (HCC)    • Diverticulosis    • Elevated PSA    • Erectile dysfunction    • Esophageal adenocarcinoma ([place] [address] ) 12/2019   • Frequency of micturition    • GERD (gastroesophageal reflux disease)    • Hiatal hernia    • History of Helicobacter pylori infection 2006   • Hyperlipidemia    • Hypertension    • Paroxysmal atrial fibrillation ([place] [address] ) 06/10/2020    Last Assessment & Plan:  - Afib RVR post op day #6 at [facility] s/p thoracoscopy/lympadenectomy rate rhythm control with diltiazem and amiodarone  No AC started  - Amio washout completed, 2 week Zio patch monitor to r/o afib recommended, this was explained in detail with pt and his wife  - Educated on the S/S of atrial fibrillation, TIA, CVA and he is instructed to call for any heart racing or    • Tobacco use disorder 12/22/2010    Last Assessment & Plan:  Pt has been smoking less than 3 pp week not interested in quitting    • Weight loss 06/10/2020    Last Assessment & Plan:  - Unplanned weight loss accompanied by poor appetite and poor po intake since surgery - He has visit with surgeon tomorrow- advised to discuss his symptoms/weight loss - Recommended close follow up with PCP as well         PROBLEM LIST    Patient Active Problem List   Diagnosis   • Type 2 diabetes mellitus with both eyes affected by mild nonproliferative retinopathy without macular edema, without long-term current use of insulin (HCC)   • BPH with urinary obstruction   • Erectile dysfunction   • Essential hypertension   • Hyperlipidemia   • Obstructive sleep apnea   • Frequency of urination   • Lumbar radiculopathy, chronic   • Iron deficiency anemia secondary to inadequate dietary iron intake   • Primary adenocarcinoma of distal third of esophagus (HCC)   • SVT (supraventricular tachycardia) (HCC)   • RBBB   • Vitamin B12 deficiency   • Stage 3b chronic kidney disease ([address] )   • Secondary hyperparathyroidism of renal origin ([facility] [address] )   • Vitamin D deficiency       PAST SURGICAL HISTORY:  Past Surgical History:   Procedure Laterality Date   • APPENDECTOMY     • BACK SURGERY     • CATARACT EXTRACTION Bilateral    • COLONOSCOPY  06/2011    diverticulosis   • COLONOSCOPY  11/2015    diverticulosis   • EGD  12/2019    distal esophageal adenocarcinoma   • EGD  05/2021    status post esophagus resection, esophagogastric anastomosis appears normal   Dilated to 50 [place]   • ESOPHAGECTOMY  05/2020    at [facility] following chemoradiation for T3N0M0 adenocarcinoma   • LUMBAR "LAMINECTOMY     • MASTOID SURGERY     • NASAL SEPTUM SURGERY     • VASECTOMY         SOCIAL HISTORY :   reports that he quit smoking about 3 years ago  His smoking use included cigarettes  He started smoking about 66 years ago  He has a 60 00 pack-year smoking history  He has never used smokeless tobacco  He reports current alcohol use of about 1 0 standard drink of alcohol per week  He reports that he does not use drugs  FAMILY HISTORY:  Family History   Problem Relation Age of Onset   • Heart disease Mother    • Hypertension Mother    • Kidney disease Father    • Colon cancer Father    • Cancer Father         Colon   • Leukemia Cousin        ALLERGIES:  No Known Allergies        PHYSICAL EXAM:  Vitals:    06/26/23 1448   BP: 102/72   BP Location: Right arm   Patient Position: Sitting   Cuff Size: Adult   Weight: 64 9 kg (143 lb)   Height: 5' 9\" (1 753 m)     Body mass index is 21 12 kg/m²  Physical Exam  Vitals reviewed  Constitutional:       General: He is not in acute distress  Appearance: Normal appearance  He is normal weight  He is not ill-appearing, toxic-appearing or diaphoretic  HENT:      Head: Normocephalic and atraumatic  Mouth/Throat:      Pharynx: No oropharyngeal exudate  Eyes:      General: No scleral icterus  Conjunctiva/sclera: Conjunctivae normal    Cardiovascular:      Rate and Rhythm: Normal rate  Heart sounds: Normal heart sounds  No friction rub  Pulmonary:      Effort: No respiratory distress  Breath sounds: Normal breath sounds  No stridor  Abdominal:      General: There is no distension  Palpations: Abdomen is soft  There is no mass  Tenderness: There is no abdominal tenderness  There is no right CVA tenderness or left CVA tenderness  Musculoskeletal:         General: No swelling  Cervical back: Normal range of motion  No rigidity  Skin:     General: Skin is warm  Coloration: Skin is not jaundiced     Neurological:      " General: No focal deficit present  Mental Status: He is alert and oriented to person, place, and time     Psychiatric:         Mood and Affect: Mood normal          Behavior: Behavior normal          LABORATORY DATA:     Results from last 6 Months   Lab Units 06/02/23  0841 03/02/23  0900 01/30/23  0823   WBC Thousand/uL  --   --  6 69   HEMOGLOBIN g/dL  --   --  11 2*   HEMATOCRIT %  --   --  35 7*   PLATELETS Thousands/uL  --   --  242   POTASSIUM mmol/L 4 4 3 6 5 1   CHLORIDE mmol/L 109* 107 109*   CO2 mmol/L 26 27 27   BUN mg/dL 22 41* 22   CREATININE mg/dL 1 58* 1 67* 1 57*   CALCIUM mg/dL 9 2 9 1 9 3   PHOSPHORUS mg/dL 3 5  --  3 6        rest all reviewed    RADIOLOGY:  No orders to display     Rest all reviewed        MEDICATIONS:    Current Outpatient Medications:   •  amLODIPine (NORVASC) 5 mg tablet, Take 1 tablet (5 mg total) by mouth daily, Disp: 90 tablet, Rfl: 3  •  calcitriol (ROCALTROL) 0 25 mcg capsule, Take 1 capsule (0 25 mcg total) by mouth 3 (three) times a week, Disp: 36 capsule, Rfl: 4  •  Cholecalciferol 25 MCG (1000 UT) capsule, Take 1,000 Units by mouth daily, Disp: , Rfl:   •  Ferrous Sulfate (FEOSOL PO), Take 1 tablet by mouth daily, Disp: , Rfl:   •  finasteride (PROSCAR) 5 mg tablet, TAKE 1 TABLET BY MOUTH EVERY DAY, Disp: 90 tablet, Rfl: 3  •  losartan (COZAAR) 50 mg tablet, Take 1 tablet (50 mg total) by mouth daily, Disp: 90 tablet, Rfl: 3  •  metFORMIN (GLUCOPHAGE) 500 mg tablet, Take 1 tablet (500 mg total) by mouth daily with breakfast, Disp: 90 tablet, Rfl: 0  •  omeprazole (PriLOSEC) 40 MG capsule, TAKE 1 CAPSULE BY MOUTH TWO TIMES DAILY, Disp: 180 capsule, Rfl: 3  •  simvastatin (ZOCOR) 10 mg tablet, TAKE 1 TABLET BY MOUTH FOUR TIMES WEEKLY, Disp: 48 tablet, Rfl: 3  •  tamsulosin (FLOMAX) 0 4 mg, TAKE 1 CAPSULE BY MOUTH EVERY DAY WITH DINNER, Disp: 90 capsule, Rfl: 3    Current Facility-Administered Medications:   •  cyanocobalamin injection 1,000 mcg, 1,000 mcg, "Intramuscular, Q30 Days, Azalea Pacheco MD, 1,000 mcg at 06/15/23 1346          Portions of the record may have been created with voice recognition software  Occasional wrong word or \"sound a like\" substitutions may have occurred due to the inherent limitations of voice recognition software  Read the chart carefully and recognize, using context, where substitutions have occurred  If you have any questions, please contact the dictating provider        "

## 2023-06-26 NOTE — PATIENT INSTRUCTIONS
"- check your blood pressure at home    - Please call me in 10 days after having your blood work done to review the results if you do not hear back from me or my office, as I may have not received the results  - please remember to perform blood work prior to the next visit  - Please call if the blood pressure top number is greater than 140 or less than 110 consistently  - Please call if you are gaining more than 2lbs in 2 days for adjustment of water pills   ~ Please AVOID the following pain medications  LIST OF NSAIDS (NONSTEROIDAL ANTI-INFLAMMATORY DRUGS) AND GUZMÁN-2 INHIBITORS    DIFLUNISAL (DOLOBID)  IBUPROFEN (MOTRIN, ADVIL)  FLURBIPROFEN (ANSAID)  KETOPROFEN (ORUDIS, ORUVAIL)  FENOPROFEN (NALFON)  NABUMETONE (RELAFEN)  PIROXICAM (FELDENE)  NAPROXEN (ALEVE, NAPROSYN, NAPRELAN, ANAPROX)  DICLOFENAC (VOLTAREN, CATAFLAM)  INDOMETHACIN (INDOCIN)  SULINDAC (CLINORIL)  TOLMETIN (TOLETIN)  ETODOLAC (LODINE)  MELOXICAM (MOBIC)  KETOROLAC (TORADOL)  OXAPROZIN (DAYPRO)  CELECOXIB (CELEBREX)    Phosphorus diet  Follow a low phosphorus diet      Avoid these higher phosphorus foods: Choose these lower phosphorus foods:   Milk, pudding or yogurt (from animals and from many soy varieties) Rice milk (unfortified), nondairy creamer (if it doesn't have terms in the ingredients list that contain the letters \"phos\")   Hard cheeses, ricotta or cottage cheese, fat-free cream cheese Regular and low-fat cream cheese   Ice cream or frozen yogurt Sherbet or frozen fruit pops   Soups made with higher phosphorus ingredients (milk, dried peas, beans, lentils) Soups made with lower phosphorus ingredients (broth- or water-based with other lower phosphorus ingredients)   Whole grains, including whole-grain breads, crackers, cereal, rice and pasta Refined grains, including white bread, crackers, cereals, rice and pasta   Quick breads, biscuits, cornbread, muffins, pancakes or waffles Homemade refined (white) dinner rolls, bagels or English " "muffins   Dried peas (split, black-eyed), beans (black, garbanzo, lima, kidney, navy, calzada) or lentils Green peas (canned, frozen), green beans or wax beans   Organ meats, walleye, pollock or sardines Lean beef, pork, lamb, poultry or other fish   Nuts and seeds Popcorn   Peanut butter and other nut butters Jam, jelly or honey   Chocolate, including chocolate drinks Carob (chocolate-flavored) candy, hard candy or gumdrops   Liset and pepper-type sodas, flavored arellano, bottled teas (if a term in the ingredients list contains the letters \"phos\") Lemon-lime soda, ginger ale or root beer, plain water   Follow a moderate potassium diet          "

## 2023-07-05 RX ORDER — SODIUM CHLORIDE 9 MG/ML
125 INJECTION, SOLUTION INTRAVENOUS CONTINUOUS
Status: CANCELLED | OUTPATIENT
Start: 2023-07-05

## 2023-07-07 ENCOUNTER — ANESTHESIA EVENT (OUTPATIENT)
Dept: GASTROENTEROLOGY | Facility: HOSPITAL | Age: 88
End: 2023-07-07

## 2023-07-07 ENCOUNTER — HOSPITAL ENCOUNTER (OUTPATIENT)
Dept: GASTROENTEROLOGY | Facility: HOSPITAL | Age: 88
Setting detail: OUTPATIENT SURGERY
End: 2023-07-07
Attending: INTERNAL MEDICINE
Payer: COMMERCIAL

## 2023-07-07 ENCOUNTER — ANESTHESIA (OUTPATIENT)
Dept: GASTROENTEROLOGY | Facility: HOSPITAL | Age: 88
End: 2023-07-07

## 2023-07-07 VITALS
HEART RATE: 42 BPM | TEMPERATURE: 97.4 F | OXYGEN SATURATION: 99 % | DIASTOLIC BLOOD PRESSURE: 62 MMHG | RESPIRATION RATE: 18 BRPM | SYSTOLIC BLOOD PRESSURE: 130 MMHG

## 2023-07-07 DIAGNOSIS — C15.9 ADENOCARCINOMA OF ESOPHAGUS (HCC): ICD-10-CM

## 2023-07-07 DIAGNOSIS — K21.9 GASTROESOPHAGEAL REFLUX DISEASE, UNSPECIFIED WHETHER ESOPHAGITIS PRESENT: ICD-10-CM

## 2023-07-07 PROCEDURE — 88305 TISSUE EXAM BY PATHOLOGIST: CPT | Performed by: STUDENT IN AN ORGANIZED HEALTH CARE EDUCATION/TRAINING PROGRAM

## 2023-07-07 PROCEDURE — 45380 COLONOSCOPY AND BIOPSY: CPT | Performed by: INTERNAL MEDICINE

## 2023-07-07 PROCEDURE — 88342 IMHCHEM/IMCYTCHM 1ST ANTB: CPT | Performed by: STUDENT IN AN ORGANIZED HEALTH CARE EDUCATION/TRAINING PROGRAM

## 2023-07-07 PROCEDURE — 88341 IMHCHEM/IMCYTCHM EA ADD ANTB: CPT | Performed by: STUDENT IN AN ORGANIZED HEALTH CARE EDUCATION/TRAINING PROGRAM

## 2023-07-07 RX ORDER — SODIUM CHLORIDE 9 MG/ML
125 INJECTION, SOLUTION INTRAVENOUS CONTINUOUS
Status: DISCONTINUED | OUTPATIENT
Start: 2023-07-07 | End: 2023-07-11 | Stop reason: HOSPADM

## 2023-07-07 RX ORDER — PROPOFOL 10 MG/ML
INJECTION, EMULSION INTRAVENOUS AS NEEDED
Status: DISCONTINUED | OUTPATIENT
Start: 2023-07-07 | End: 2023-07-07

## 2023-07-07 RX ORDER — LIDOCAINE HYDROCHLORIDE 20 MG/ML
INJECTION, SOLUTION EPIDURAL; INFILTRATION; INTRACAUDAL; PERINEURAL AS NEEDED
Status: DISCONTINUED | OUTPATIENT
Start: 2023-07-07 | End: 2023-07-07

## 2023-07-07 RX ADMIN — LIDOCAINE HYDROCHLORIDE 80 MG: 20 INJECTION, SOLUTION EPIDURAL; INFILTRATION; INTRACAUDAL at 09:04

## 2023-07-07 RX ADMIN — PROPOFOL 100 MG: 10 INJECTION, EMULSION INTRAVENOUS at 09:04

## 2023-07-07 RX ADMIN — SODIUM CHLORIDE 125 ML/HR: 0.9 INJECTION, SOLUTION INTRAVENOUS at 08:37

## 2023-07-07 NOTE — INTERVAL H&P NOTE
H&P reviewed. After examining the patient I find no changes in the patients condition since the H&P had been written.   No complaints of abdominal pain or dysphagia  Vitals:    07/07/23 0822   BP: 126/60   Pulse: (!) 53   Resp: 16   Temp: (!) 97.4 °F (36.3 °C)   SpO2: 100%

## 2023-07-07 NOTE — ANESTHESIA PREPROCEDURE EVALUATION
Review of Systems/Medical History  Patient summary reviewed. Chart reviewed. No history of anesthetic complications     Cardiovascular  Negative cardio ROS Exercise tolerance (METS): >4.,  Hyperlipidemia, Hypertension controlled,    Pulmonary  Smoker cigarette smoker  Cumulative Pack Years: 20, Sleep apnea ,        GI/Hepatic    GERD well controlled, Esophageal disease medina esophagus,  Hiatal hernia,        Negative  ROS Prostatic disorder, benign prostatic hyperplasia       Endo/Other  Diabetes well controlled type 2 Oral agent,      GYN       Hematology  Anemia ,     Musculoskeletal    Arthritis     Neurology  Negative neurology ROS      Psychology   Negative psychology ROS              Physical Exam    Airway    Mallampati score: II  TM Distance: >3 FB  Neck ROM: full     Dental   upper dentures and lower dentures,     Cardiovascular  Comment: Negative ROS, Rhythm: regular, Rate: normal, Cardiovascular exam normal    Pulmonary  Pulmonary exam normal Breath sounds clear to auscultation,     Other Findings  Pt states he has worn his dentures for EGDs in the past  Pt understands complications      Anesthesia Plan  ASA Score- 3     Anesthesia Type- IV sedation with anesthesia with ASA Monitors. Additional Monitors:   Airway Plan:           Plan Factors-Exercise tolerance (METS): >4.    Chart reviewed. Patient summary reviewed. Patient is not a current smoker. Patient not instructed to abstain from smoking on day of procedure. Patient did not smoke on day of surgery. Induction- intravenous. Postoperative Plan-     Informed Consent- Anesthetic plan and risks discussed with patient. I personally reviewed this patient with the CRNA. Discussed and agreed on the Anesthesia Plan with the CRNA. Isabela Marquez

## 2023-07-07 NOTE — ANESTHESIA POSTPROCEDURE EVALUATION
Post-Op Assessment Note    CV Status:  Stable    Pain management: adequate     Mental Status:  Alert and awake   Hydration Status:  Euvolemic   PONV Controlled:  Controlled   Airway Patency:  Patent      Post Op Vitals Reviewed: Yes      Staff: Anesthesiologist, CRNA         No notable events documented.     BP      Temp     Pulse    Resp      SpO2      /62   Pulse (!) 42   Temp (!) 97.4 °F (36.3 °C) (Temporal)   Resp 18   SpO2 99%

## 2023-07-11 ENCOUNTER — RA CDI HCC (OUTPATIENT)
Dept: OTHER | Facility: HOSPITAL | Age: 88
End: 2023-07-11

## 2023-07-12 ENCOUNTER — OFFICE VISIT (OUTPATIENT)
Dept: FAMILY MEDICINE CLINIC | Facility: CLINIC | Age: 88
End: 2023-07-12
Payer: COMMERCIAL

## 2023-07-12 VITALS
WEIGHT: 139.8 LBS | HEART RATE: 52 BPM | SYSTOLIC BLOOD PRESSURE: 102 MMHG | DIASTOLIC BLOOD PRESSURE: 52 MMHG | BODY MASS INDEX: 20.71 KG/M2 | HEIGHT: 69 IN | OXYGEN SATURATION: 98 %

## 2023-07-12 DIAGNOSIS — I10 ESSENTIAL HYPERTENSION: Primary | ICD-10-CM

## 2023-07-12 DIAGNOSIS — E11.3293 TYPE 2 DIABETES MELLITUS WITH BOTH EYES AFFECTED BY MILD NONPROLIFERATIVE RETINOPATHY WITHOUT MACULAR EDEMA, WITHOUT LONG-TERM CURRENT USE OF INSULIN (HCC): ICD-10-CM

## 2023-07-12 DIAGNOSIS — N13.8 BPH WITH URINARY OBSTRUCTION: ICD-10-CM

## 2023-07-12 DIAGNOSIS — N40.1 BPH WITH URINARY OBSTRUCTION: ICD-10-CM

## 2023-07-12 DIAGNOSIS — G47.33 OBSTRUCTIVE SLEEP APNEA: ICD-10-CM

## 2023-07-12 DIAGNOSIS — C15.5 PRIMARY ADENOCARCINOMA OF DISTAL THIRD OF ESOPHAGUS (HCC): ICD-10-CM

## 2023-07-12 DIAGNOSIS — N18.32 STAGE 3B CHRONIC KIDNEY DISEASE (HCC): ICD-10-CM

## 2023-07-12 DIAGNOSIS — E53.8 VITAMIN B12 DEFICIENCY: ICD-10-CM

## 2023-07-12 DIAGNOSIS — N25.81 SECONDARY HYPERPARATHYROIDISM OF RENAL ORIGIN (HCC): ICD-10-CM

## 2023-07-12 DIAGNOSIS — E78.00 PURE HYPERCHOLESTEROLEMIA: ICD-10-CM

## 2023-07-12 LAB — SL AMB POCT HEMOGLOBIN AIC: 6.3 (ref ?–6.5)

## 2023-07-12 PROCEDURE — 96372 THER/PROPH/DIAG INJ SC/IM: CPT | Performed by: FAMILY MEDICINE

## 2023-07-12 PROCEDURE — 83036 HEMOGLOBIN GLYCOSYLATED A1C: CPT | Performed by: FAMILY MEDICINE

## 2023-07-12 PROCEDURE — 99214 OFFICE O/P EST MOD 30 MIN: CPT | Performed by: FAMILY MEDICINE

## 2023-07-12 RX ORDER — LOSARTAN POTASSIUM 25 MG/1
25 TABLET ORAL DAILY
Qty: 30 TABLET | Refills: 5 | Status: SHIPPED | OUTPATIENT
Start: 2023-07-12

## 2023-07-12 RX ADMIN — CYANOCOBALAMIN 1000 MCG: 1000 INJECTION, SOLUTION INTRAMUSCULAR; SUBCUTANEOUS at 10:38

## 2023-07-12 NOTE — PROGRESS NOTES
[address] [facility] coding opportunities     E11.22     Chart Reviewed number of suggestions sent to Provider: 1   GR    Patients Insurance     Medicare Insurance: [facility]

## 2023-07-12 NOTE — PROGRESS NOTES
Name: Kenia Wolff      : 1935      MRN: [id number]  Encounter Provider: Hussein Santos MD  Encounter Date: 2023   Encounter department: [facility] [address]     Mr. Lila Pink is a pleasant 53-year-old male who presents today with his wife to review chronic medical conditions. Since I last saw him, he has had an EGD with Dr. Alfa Patino last week. Biopsies were done with results pending. Apparently, his esophagus looked good with no suspicious findings. Awaiting biopsy results. Patient reports feeling well. He notes a recent 3 pound weight loss but admits to fluctuating appetite. He also notes sometimes feeling dizzy, especially when at the gym and wonders if it is due to his losartan. His blood pressure does run on the low side. Today he is due for hemoglobin A1c.    1.  Hypertension -blood pressure is on the low side. Continue amlodipine 5 mg daily and decrease losartan from 50 to 25 mg.    2.  Diabetes -hemoglobin A1c today is 6.3. Continue metformin 500 mg daily    3. Stage IIIb chronic kidney disease -patient remains on amlodipine and losartan 50 mg. Due to his complaints today of dizziness and orthostasis, I will decrease his losartan from 50 to 25 mg. Continue to monitor symptomatically. 4.  Secondary hyperparathyroidism -due to CKD. Further intervention per Dr. Carole Sterling. 6.  Obstructive sleep apnea -he continues to wear his CPAP mask. 7.  BPH with LUTS -symptoms remain stable on finasteride and tamsulosin. 8.  Vitamin B12 deficiency -chronic B12 deficiency secondary to esophageal gastric surgery. He will be on lifetime B12 IM supplementation. 9.  Hyperlipidemia -continue simvastatin, especially in the setting of diabetes. Check lipids    10. Adenocarcinoma of distal third of esophagus -continue with annual surveillance. Recent biopsies pending. Annual blood work and follow-up in 4 months.         1. Essential hypertension  -     T4; Future  - TSH, 3rd generation; Future  -     CBC and differential; Future  -     losartan (COZAAR) 25 mg tablet; Take 1 tablet (25 mg total) by mouth daily    2. Type 2 diabetes mellitus with both eyes affected by mild nonproliferative retinopathy without macular edema, without long-term current use of insulin (HCC)  -     T4; Future  -     TSH, 3rd generation; Future  -     CBC and differential; Future  -     POCT hemoglobin A1c    3. Stage 3b chronic kidney disease (HCC)  -     T4; Future  -     TSH, 3rd generation; Future  -     CBC and differential; Future  -     losartan (COZAAR) 25 mg tablet; Take 1 tablet (25 mg total) by mouth daily    4. Secondary hyperparathyroidism of renal origin ([address])  -     T4; Future  -     TSH, 3rd generation; Future  -     CBC and differential; Future    5. Obstructive sleep apnea  -     T4; Future  -     TSH, 3rd generation; Future  -     CBC and differential; Future    6. BPH with urinary obstruction  -     T4; Future  -     TSH, 3rd generation; Future  -     CBC and differential; Future    7. Vitamin B12 deficiency  -     Vitamin B12; Future  -     Methylmalonic acid, serum; Future  -     T4; Future  -     TSH, 3rd generation; Future  -     CBC and differential; Future    8. Pure hypercholesterolemia  -     Lipid panel; Future  -     CBC and differential; Future           Subjective      Mr. Kimberly Wright is a pleasant 80-year-old male who presents today with his wife to review chronic medical conditions. Since I last saw him, he has had an EGD with Dr. Chicho Lerma last week. Biopsies were done with results pending. Apparently, his esophagus looked good with no suspicious findings. Awaiting biopsy results. Patient reports feeling well. He notes a recent 3 pound weight loss but admits to fluctuating appetite. He also notes sometimes feeling dizzy, especially when at the gym and wonders if it is due to his losartan. His blood pressure does run on the low side.   Today he is due for hemoglobin A1c.    Review of Systems   Constitutional:        See HPI   HENT: Negative for congestion, ear pain, mouth sores, sinus pressure and trouble swallowing. Eyes: Negative for discharge, redness and itching. Respiratory: Negative for apnea, cough, chest tightness, shortness of breath, wheezing and stridor. Cardiovascular: Negative for chest pain, palpitations and leg swelling. Gastrointestinal: Negative for abdominal distention, abdominal pain, blood in stool, constipation, diarrhea, nausea and vomiting. See HPI   Endocrine: Negative for cold intolerance and heat intolerance. Genitourinary: Negative for difficulty urinating, dysuria, flank pain and urgency. Musculoskeletal: Negative for arthralgias and myalgias. Skin: Negative for rash. Neurological: Positive for dizziness. Negative for seizures, syncope, speech difficulty, weakness, light-headedness, numbness and headaches. Hematological: Negative for adenopathy. Psychiatric/Behavioral: Negative for agitation, behavioral problems, confusion and sleep disturbance. The patient is not nervous/anxious.         Current Outpatient Medications on File Prior to Visit   Medication Sig   • amLODIPine (NORVASC) 5 mg tablet Take 1 tablet (5 mg total) by mouth daily   • calcitriol (ROCALTROL) 0.25 mcg capsule Take 1 capsule (0.25 mcg total) by mouth 3 (three) times a week   • Cholecalciferol 25 MCG (1000 UT) capsule Take 1,000 Units by mouth daily   • Ferrous Sulfate (FEOSOL PO) Take 1 tablet by mouth daily   • finasteride (PROSCAR) 5 mg tablet TAKE 1 TABLET BY MOUTH EVERY DAY   • metFORMIN (GLUCOPHAGE) 500 mg tablet Take 1 tablet (500 mg total) by mouth daily with breakfast   • omeprazole (PriLOSEC) 40 MG capsule TAKE 1 CAPSULE BY MOUTH TWO TIMES DAILY   • simvastatin (ZOCOR) 10 mg tablet TAKE 1 TABLET BY MOUTH FOUR TIMES WEEKLY   • tamsulosin (FLOMAX) 0.4 mg TAKE 1 CAPSULE BY MOUTH EVERY DAY WITH DINNER   • [DISCONTINUED] losartan (COZAAR) 50 mg tablet Take 1 tablet (50 mg total) by mouth daily   • [DISCONTINUED] Ferrous Sulfate (Iron) 325 (65 Fe) MG TABS Take by mouth (Patient not taking: Reported on 2/14/2023)       Objective     /52 (BP Location: Left arm, Patient Position: Sitting, Cuff Size: Large)   Pulse (!) 52   Ht 5' 9" (1.753 m)   Wt 63.4 kg (139 lb 12.8 oz)   SpO2 98%   BMI 20.64 kg/m²     Physical Exam  Vitals and nursing note reviewed. Constitutional:       General: He is not in acute distress. Appearance: Normal appearance. Comments: Well-appearing, well-groomed. He is thin but in no apparent distress and does not appear cachectic. He is an excellent historian. He is well engaged in the conversation. Eyes:      Conjunctiva/sclera: Conjunctivae normal.   Neck:      Vascular: No carotid bruit. Cardiovascular:      Pulses: Normal pulses. Heart sounds: Normal heart sounds. No murmur heard. No friction rub. No gallop. Pulmonary:      Effort: Pulmonary effort is normal. No respiratory distress. Breath sounds: No stridor. No wheezing, rhonchi or rales. Chest:      Chest wall: No tenderness. Abdominal:      General: Abdomen is flat. Bowel sounds are normal. There is no distension. Palpations: Abdomen is soft. There is no mass. Tenderness: There is no abdominal tenderness. There is no guarding or rebound. Hernia: No hernia is present. Musculoskeletal:      Cervical back: Normal range of motion and neck supple. Right lower leg: No edema. Left lower leg: No edema. Skin:     General: Skin is warm. Coloration: Skin is not jaundiced. Neurological:      General: No focal deficit present. Mental Status: He is alert and oriented to person, place, and time. Psychiatric:         Mood and Affect: Mood normal.         Behavior: Behavior normal.         Thought Content:  Thought content normal.         Judgment: Judgment normal.       Valencia Cha MD

## 2023-07-12 NOTE — Clinical Note
Hi, hope you are well. Just FYI  -I saw Michael Dacosta today. He did complain of some dizziness and slight orthostasis. I decreased his losartan from 50 to 25 mg, and he asked that I please let you know. He is very fond and appreciative of you and your care.  Delmi Plascencia

## 2023-07-14 PROCEDURE — 88305 TISSUE EXAM BY PATHOLOGIST: CPT | Performed by: STUDENT IN AN ORGANIZED HEALTH CARE EDUCATION/TRAINING PROGRAM

## 2023-07-14 PROCEDURE — 88342 IMHCHEM/IMCYTCHM 1ST ANTB: CPT | Performed by: STUDENT IN AN ORGANIZED HEALTH CARE EDUCATION/TRAINING PROGRAM

## 2023-07-14 PROCEDURE — 88341 IMHCHEM/IMCYTCHM EA ADD ANTB: CPT | Performed by: STUDENT IN AN ORGANIZED HEALTH CARE EDUCATION/TRAINING PROGRAM

## 2023-07-21 DIAGNOSIS — E11.9 TYPE 2 DIABETES MELLITUS WITHOUT COMPLICATION, WITHOUT LONG-TERM CURRENT USE OF INSULIN (HCC): ICD-10-CM

## 2023-07-21 NOTE — TELEPHONE ENCOUNTER
Last seen 7/12/23 f/u scheduled for 11/14/23 last labs done 6/2/23, active labs pending. Refilled per protocol.

## 2023-07-24 DIAGNOSIS — T47.1X5A ADVERSE EFFECT OF PROTON PUMP INHIBITOR: ICD-10-CM

## 2023-07-24 DIAGNOSIS — K21.9 GASTROESOPHAGEAL REFLUX DISEASE WITHOUT ESOPHAGITIS: Primary | ICD-10-CM

## 2023-08-17 ENCOUNTER — CLINICAL SUPPORT (OUTPATIENT)
Dept: FAMILY MEDICINE CLINIC | Facility: CLINIC | Age: 88
End: 2023-08-17
Payer: COMMERCIAL

## 2023-08-17 DIAGNOSIS — E53.8 VITAMIN B12 DEFICIENCY: Primary | ICD-10-CM

## 2023-08-17 PROCEDURE — 96372 THER/PROPH/DIAG INJ SC/IM: CPT

## 2023-08-17 RX ADMIN — CYANOCOBALAMIN 1000 MCG: 1000 INJECTION, SOLUTION INTRAMUSCULAR; SUBCUTANEOUS at 13:33

## 2023-09-02 DIAGNOSIS — E78.00 PURE HYPERCHOLESTEROLEMIA: ICD-10-CM

## 2023-09-05 RX ORDER — SIMVASTATIN 10 MG
TABLET ORAL
Qty: 48 TABLET | Refills: 3 | Status: SHIPPED | OUTPATIENT
Start: 2023-09-05

## 2023-09-07 ENCOUNTER — TELEPHONE (OUTPATIENT)
Dept: FAMILY MEDICINE CLINIC | Facility: CLINIC | Age: 88
End: 2023-09-07

## 2023-09-07 NOTE — TELEPHONE ENCOUNTER
Patient wife called in regards of her  she mention he gets a CT scan done every 6 months for his esophagus cancer she stated its for his chest, abdomen and pelvis she mention he gets it done without contrast . She would also like the blood work that goes with it as well. Please advise and give patient a call once order is placed. Thank You

## 2023-09-08 DIAGNOSIS — N18.32 STAGE 3B CHRONIC KIDNEY DISEASE (HCC): Primary | ICD-10-CM

## 2023-09-08 DIAGNOSIS — C15.5 PRIMARY ADENOCARCINOMA OF DISTAL THIRD OF ESOPHAGUS (HCC): ICD-10-CM

## 2023-09-21 ENCOUNTER — CLINICAL SUPPORT (OUTPATIENT)
Dept: FAMILY MEDICINE CLINIC | Facility: CLINIC | Age: 88
End: 2023-09-21
Payer: COMMERCIAL

## 2023-09-21 DIAGNOSIS — E53.8 VITAMIN B12 DEFICIENCY: Primary | ICD-10-CM

## 2023-09-21 RX ADMIN — CYANOCOBALAMIN 1000 MCG: 1000 INJECTION, SOLUTION INTRAMUSCULAR; SUBCUTANEOUS at 13:29

## 2023-10-03 ENCOUNTER — HOSPITAL ENCOUNTER (OUTPATIENT)
Dept: CT IMAGING | Facility: HOSPITAL | Age: 88
Discharge: HOME | End: 2023-10-03
Payer: COMMERCIAL

## 2023-10-03 DIAGNOSIS — C15.5 PRIMARY ADENOCARCINOMA OF DISTAL THIRD OF ESOPHAGUS (HCC): ICD-10-CM

## 2023-10-03 DIAGNOSIS — N18.32 STAGE 3B CHRONIC KIDNEY DISEASE (HCC): ICD-10-CM

## 2023-10-03 PROCEDURE — G1004 CDSM NDSC: HCPCS

## 2023-10-03 PROCEDURE — 74176 CT ABD & PELVIS W/O CONTRAST: CPT

## 2023-10-03 PROCEDURE — 71250 CT THORAX DX C-: CPT

## 2023-10-24 ENCOUNTER — CLINICAL SUPPORT (OUTPATIENT)
Dept: FAMILY MEDICINE CLINIC | Facility: CLINIC | Age: 88
End: 2023-10-24
Payer: COMMERCIAL

## 2023-10-24 DIAGNOSIS — E53.8 VITAMIN B12 DEFICIENCY: Primary | ICD-10-CM

## 2023-10-24 PROCEDURE — 96372 THER/PROPH/DIAG INJ SC/IM: CPT

## 2023-10-24 RX ADMIN — CYANOCOBALAMIN 1000 MCG: 1000 INJECTION, SOLUTION INTRAMUSCULAR; SUBCUTANEOUS at 13:28

## 2023-11-06 ENCOUNTER — RA CDI HCC (OUTPATIENT)
Dept: OTHER | Facility: HOSPITAL | Age: 88
End: 2023-11-06

## 2023-11-06 NOTE — PROGRESS NOTES
[address] coding opportunities       Chart reviewed, no opportunity found: [address] Review     Patients Insurance     Medicare Insurance: [employer]

## 2023-11-10 ENCOUNTER — APPOINTMENT (OUTPATIENT)
Dept: LAB | Facility: MEDICAL CENTER | Age: 88
End: 2023-11-10
Payer: COMMERCIAL

## 2023-11-10 DIAGNOSIS — N18.32 STAGE 3B CHRONIC KIDNEY DISEASE (HCC): ICD-10-CM

## 2023-11-10 DIAGNOSIS — G47.33 OBSTRUCTIVE SLEEP APNEA: ICD-10-CM

## 2023-11-10 DIAGNOSIS — N13.8 BPH WITH URINARY OBSTRUCTION: ICD-10-CM

## 2023-11-10 DIAGNOSIS — E53.8 VITAMIN B12 DEFICIENCY: ICD-10-CM

## 2023-11-10 DIAGNOSIS — E78.00 PURE HYPERCHOLESTEROLEMIA: ICD-10-CM

## 2023-11-10 DIAGNOSIS — I10 ESSENTIAL HYPERTENSION: ICD-10-CM

## 2023-11-10 DIAGNOSIS — E11.3293 TYPE 2 DIABETES MELLITUS WITH BOTH EYES AFFECTED BY MILD NONPROLIFERATIVE RETINOPATHY WITHOUT MACULAR EDEMA, WITHOUT LONG-TERM CURRENT USE OF INSULIN (HCC): ICD-10-CM

## 2023-11-10 DIAGNOSIS — N25.81 SECONDARY HYPERPARATHYROIDISM OF RENAL ORIGIN (HCC): ICD-10-CM

## 2023-11-10 DIAGNOSIS — N40.1 BPH WITH URINARY OBSTRUCTION: ICD-10-CM

## 2023-11-10 LAB
BASOPHILS # BLD AUTO: 0.05 THOUSANDS/ÂΜL (ref 0–0.1)
BASOPHILS NFR BLD AUTO: 1 % (ref 0–1)
CHOLEST SERPL-MCNC: 153 MG/DL
EOSINOPHIL # BLD AUTO: 0.15 THOUSAND/ÂΜL (ref 0–0.61)
EOSINOPHIL NFR BLD AUTO: 3 % (ref 0–6)
ERYTHROCYTE [DISTWIDTH] IN BLOOD BY AUTOMATED COUNT: 13.8 % (ref 11.6–15.1)
HCT VFR BLD AUTO: 34 % (ref 36.5–49.3)
HDLC SERPL-MCNC: 80 MG/DL
HGB BLD-MCNC: 11 G/DL (ref 12–17)
IMM GRANULOCYTES # BLD AUTO: 0.03 THOUSAND/UL (ref 0–0.2)
IMM GRANULOCYTES NFR BLD AUTO: 1 % (ref 0–2)
LDLC SERPL CALC-MCNC: 59 MG/DL (ref 0–100)
LYMPHOCYTES # BLD AUTO: 0.8 THOUSANDS/ÂΜL (ref 0.6–4.47)
LYMPHOCYTES NFR BLD AUTO: 14 % (ref 14–44)
MCH RBC QN AUTO: 30.6 PG (ref 26.8–34.3)
MCHC RBC AUTO-ENTMCNC: 32.4 G/DL (ref 31.4–37.4)
MCV RBC AUTO: 95 FL (ref 82–98)
MONOCYTES # BLD AUTO: 0.66 THOUSAND/ÂΜL (ref 0.17–1.22)
MONOCYTES NFR BLD AUTO: 12 % (ref 4–12)
NEUTROPHILS # BLD AUTO: 4.06 THOUSANDS/ÂΜL (ref 1.85–7.62)
NEUTS SEG NFR BLD AUTO: 69 % (ref 43–75)
NONHDLC SERPL-MCNC: 73 MG/DL
NRBC BLD AUTO-RTO: 0 /100 WBCS
PLATELET # BLD AUTO: 199 THOUSANDS/UL (ref 149–390)
PMV BLD AUTO: 11.3 FL (ref 8.9–12.7)
RBC # BLD AUTO: 3.59 MILLION/UL (ref 3.88–5.62)
T4 SERPL-MCNC: 7.84 UG/DL (ref 6.09–12.23)
TRIGL SERPL-MCNC: 72 MG/DL
TSH SERPL DL<=0.05 MIU/L-ACNC: 2.66 UIU/ML (ref 0.45–4.5)
VIT B12 SERPL-MCNC: 483 PG/ML (ref 180–914)
WBC # BLD AUTO: 5.75 THOUSAND/UL (ref 4.31–10.16)

## 2023-11-10 PROCEDURE — 84436 ASSAY OF TOTAL THYROXINE: CPT

## 2023-11-10 PROCEDURE — 36415 COLL VENOUS BLD VENIPUNCTURE: CPT

## 2023-11-10 PROCEDURE — 85025 COMPLETE CBC W/AUTO DIFF WBC: CPT

## 2023-11-10 PROCEDURE — 83918 ORGANIC ACIDS TOTAL QUANT: CPT

## 2023-11-10 PROCEDURE — 82607 VITAMIN B-12: CPT

## 2023-11-10 PROCEDURE — 80061 LIPID PANEL: CPT

## 2023-11-10 PROCEDURE — 84443 ASSAY THYROID STIM HORMONE: CPT

## 2023-11-14 ENCOUNTER — OFFICE VISIT (OUTPATIENT)
Dept: FAMILY MEDICINE CLINIC | Facility: CLINIC | Age: 88
End: 2023-11-14
Payer: COMMERCIAL

## 2023-11-14 VITALS
OXYGEN SATURATION: 100 % | WEIGHT: 138.5 LBS | BODY MASS INDEX: 20.45 KG/M2 | DIASTOLIC BLOOD PRESSURE: 74 MMHG | SYSTOLIC BLOOD PRESSURE: 138 MMHG | HEART RATE: 62 BPM | TEMPERATURE: 96.9 F

## 2023-11-14 DIAGNOSIS — E53.8 VITAMIN B12 DEFICIENCY: ICD-10-CM

## 2023-11-14 DIAGNOSIS — N40.1 BPH WITH URINARY OBSTRUCTION: ICD-10-CM

## 2023-11-14 DIAGNOSIS — N18.32 STAGE 3B CHRONIC KIDNEY DISEASE (HCC): ICD-10-CM

## 2023-11-14 DIAGNOSIS — N13.8 BPH WITH URINARY OBSTRUCTION: ICD-10-CM

## 2023-11-14 DIAGNOSIS — C15.5 PRIMARY ADENOCARCINOMA OF DISTAL THIRD OF ESOPHAGUS (HCC): ICD-10-CM

## 2023-11-14 DIAGNOSIS — D50.8 IRON DEFICIENCY ANEMIA SECONDARY TO INADEQUATE DIETARY IRON INTAKE: ICD-10-CM

## 2023-11-14 DIAGNOSIS — E11.3293 TYPE 2 DIABETES MELLITUS WITH BOTH EYES AFFECTED BY MILD NONPROLIFERATIVE RETINOPATHY WITHOUT MACULAR EDEMA, WITHOUT LONG-TERM CURRENT USE OF INSULIN (HCC): Primary | ICD-10-CM

## 2023-11-14 DIAGNOSIS — E78.00 PURE HYPERCHOLESTEROLEMIA: ICD-10-CM

## 2023-11-14 PROCEDURE — 96372 THER/PROPH/DIAG INJ SC/IM: CPT | Performed by: FAMILY MEDICINE

## 2023-11-14 PROCEDURE — 99214 OFFICE O/P EST MOD 30 MIN: CPT | Performed by: FAMILY MEDICINE

## 2023-11-14 RX ADMIN — CYANOCOBALAMIN 1000 MCG: 1000 INJECTION, SOLUTION INTRAMUSCULAR; SUBCUTANEOUS at 10:22

## 2023-11-14 NOTE — PROGRESS NOTES
Name: Xi Wallace      : 1935      MRN: [id number]  Encounter Provider: Hugh Espinal MD  Encounter Date: 2023   Encounter department: [place] and youthful 17-year-old gentleman presents today with his wife to review chronic medical conditions. He also has blood work to review. He is feeling quite well. He has maintained his weight. Offers no acute complaints today. 1. Type 2 diabetes mellitus with both eyes affected by mild nonproliferative retinopathy without macular edema, without long-term current use of insulin (HCC)  Assessment & Plan:  Well-controlled. Continue metformin 500 mg daily. Lab Results   Component Value Date    HGBA1C 6.3 2023         2. Primary adenocarcinoma of distal third of esophagus [facility])  Assessment & Plan:  Most recent CT scan showed no convincing evidence of metastatic disease. CBC reviewed with patient and is stable. His weight is stable any has no new complaints. He remains on iron supplements and B12 supplements due to malabsorption. Continue omeprazole 40 mg daily. Continue annual surveillance. 3. Stage 3b chronic kidney disease ([address])  Assessment & Plan:  Labs are stable. Continue to stay well-hydrated. Continue to avoid nephrotoxic agents including contrast dye if possible. Lab Results   Component Value Date    EGFR 39 2023    EGFR 36 2023    EGFR 39 2023    CREATININE 1.58 (H) 2023    CREATININE 1.67 (H) 2023    CREATININE 1.57 (H) 2023         4. Pure hypercholesterolemia  Assessment & Plan:  Stable on simvastatin. 5. Vitamin B12 deficiency  Assessment & Plan:  Patient continues on IM B12 supplementation monthly. B12 is within normal limits. MMA pending. 6. Iron deficiency anemia secondary to inadequate dietary iron intake  Assessment & Plan:  Remains on iron supplements. Hemoglobin and hematocrit are stable.       7. BPH with urinary obstruction  Assessment & Plan:  Symptoms stable on tamsulosin and finasteride. Dr. Ada Snellen. Labs printed and reviewed at length with patient today. Return to office in 4 months. Subjective      Chief Complaint   Patient presents with    Follow-up     For chronic conditions. Pt is wondering if all meds are needed. 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  mgb       Pleasant and youthful 82-year-old gentleman presents today with his wife to review chronic medical conditions. He also has blood work to review. He is feeling quite well. He has maintained his weight. Offers no acute complaints today. Review of Systems   Constitutional:         See HPI   HENT:  Negative for congestion, ear pain, mouth sores, sinus pressure and trouble swallowing. Eyes:  Negative for discharge, redness and itching. Respiratory:  Negative for apnea, cough, chest tightness, shortness of breath, wheezing and stridor. Cardiovascular:  Negative for chest pain, palpitations and leg swelling. Gastrointestinal:  Negative for abdominal distention, abdominal pain, blood in stool, constipation, diarrhea, nausea and vomiting. Endocrine: Negative for cold intolerance and heat intolerance. Genitourinary:  Negative for difficulty urinating, dysuria, flank pain and urgency. Musculoskeletal:  Negative for arthralgias and myalgias. Skin:  Negative for rash. Neurological:  Negative for dizziness, seizures, syncope, speech difficulty, weakness, light-headedness, numbness and headaches. Hematological:  Negative for adenopathy. Psychiatric/Behavioral:  Negative for agitation, behavioral problems, confusion and sleep disturbance. The patient is not nervous/anxious.         Current Outpatient Medications on File Prior to Visit   Medication Sig    amLODIPine (NORVASC) 5 mg tablet Take 1 tablet (5 mg total) by mouth daily    calcitriol (ROCALTROL) 0.25 mcg capsule Take 1 capsule (0.25 mcg total) by mouth 3 (three) times a week    Cholecalciferol 25 MCG (1000 UT) capsule Take 1,000 Units by mouth daily    Ferrous Sulfate (FEOSOL PO) Take 1 tablet by mouth daily    finasteride (PROSCAR) 5 mg tablet TAKE 1 TABLET BY MOUTH EVERY DAY    losartan (COZAAR) 25 mg tablet Take 1 tablet (25 mg total) by mouth daily    metFORMIN (GLUCOPHAGE) 500 mg tablet TAKE 1 TABLET BY MOUTH EVERY DAY WITH BREAKFAST    simvastatin (ZOCOR) 10 mg tablet TAKE 1 TABLET BY MOUTH 4 TIMES WEEKLY    tamsulosin (FLOMAX) 0.4 mg TAKE 1 CAPSULE BY MOUTH EVERY DAY WITH DINNER    [DISCONTINUED] omeprazole (PriLOSEC) 40 MG capsule TAKE 1 CAPSULE BY MOUTH TWO TIMES DAILY    [DISCONTINUED] Ferrous Sulfate (Iron) 325 (65 Fe) MG TABS Take by mouth (Patient not taking: Reported on 2/14/2023)       Objective     /74 (BP Location: Right arm, Patient Position: Sitting, Cuff Size: Standard)   Pulse 62   Temp (!) 96.9 °F (36.1 °C) (Temporal)   Wt 62.8 kg (138 lb 8 oz)   SpO2 100%   BMI 20.45 kg/m²   Diabetic Foot Exam    Patient's shoes and socks removed. Right Foot/Ankle   Right Foot Inspection  Skin Exam: skin normal and skin intact. No dry skin, no warmth, no callus, no erythema, no maceration, no abnormal color, no pre-ulcer, no ulcer and no callus. Toe Exam: ROM and strength within normal limits. No swelling, no tenderness, erythema and  no right toe deformity    Sensory   Proprioception: intact  Monofilament testing: intact    Vascular  Capillary refills: < 3 seconds  The right DP pulse is 2+. The right PT pulse is 2+. Left Foot/Ankle  Left Foot Inspection  Skin Exam: skin normal and skin intact. No dry skin, no warmth, no erythema, no maceration, normal color, no pre-ulcer, no ulcer and no callus. Toe Exam: ROM and strength within normal limits. No swelling, no tenderness, no erythema and no left toe deformity.      Sensory   Proprioception: intact  Monofilament testing: intact    Vascular  Capillary refills: < 3 seconds  The left DP pulse is 2+. The left PT pulse is 2+. Assign Risk Category  No deformity present  No loss of protective sensation  No weak pulses  Risk: 0    Physical Exam  Vitals and nursing note reviewed. Constitutional:       General: He is not in acute distress. Appearance: Normal appearance. Eyes:      Conjunctiva/sclera: Conjunctivae normal.   Neck:      Vascular: No carotid bruit. Cardiovascular:      Pulses: Normal pulses. no weak pulses          Dorsalis pedis pulses are 2+ on the right side and 2+ on the left side. Posterior tibial pulses are 2+ on the right side and 2+ on the left side. Heart sounds: Normal heart sounds. No murmur heard. No friction rub. No gallop. Pulmonary:      Effort: Pulmonary effort is normal. No respiratory distress. Breath sounds: No stridor. No wheezing, rhonchi or rales. Chest:      Chest wall: No tenderness. Abdominal:      General: Abdomen is flat. Bowel sounds are normal. There is no distension. Palpations: Abdomen is soft. There is no mass. Tenderness: There is no abdominal tenderness. There is no guarding or rebound. Hernia: No hernia is present. Musculoskeletal:      Cervical back: Normal range of motion and neck supple. Right lower leg: No edema. Left lower leg: No edema. Feet:      Right foot:      Skin integrity: No ulcer, skin breakdown, erythema, warmth, callus or dry skin. Left foot:      Skin integrity: No ulcer, skin breakdown, erythema, warmth, callus or dry skin. Skin:     General: Skin is warm. Coloration: Skin is not jaundiced. Neurological:      General: No focal deficit present. Mental Status: He is alert and oriented to person, place, and time. Psychiatric:         Mood and Affect: Mood normal.         Behavior: Behavior normal.         Thought Content:  Thought content normal.         Judgment: Judgment normal.       Hunter Lambert MD Quality 226: Preventive Care And Screening: Tobacco Use: Screening And Cessation Intervention: Patient screened for tobacco use and is an ex/non-smoker Detail Level: Detailed Quality 130: Documentation Of Current Medications In The Medical Record: Current Medications Documented Quality 431: Preventive Care And Screening: Unhealthy Alcohol Use - Screening: Patient not identified as an unhealthy alcohol user when screened for unhealthy alcohol use using a systematic screening method

## 2023-11-14 NOTE — ASSESSMENT & PLAN NOTE
Most recent CT scan showed no convincing evidence of metastatic disease. CBC reviewed with patient and is stable. His weight is stable any has no new complaints. He remains on iron supplements and B12 supplements due to malabsorption. Continue omeprazole 40 mg daily. Continue annual surveillance.

## 2023-11-14 NOTE — ASSESSMENT & PLAN NOTE
Well-controlled. Continue metformin 500 mg daily.     Lab Results   Component Value Date    HGBA1C 6.3 07/12/2023

## 2023-11-14 NOTE — ASSESSMENT & PLAN NOTE
Labs are stable. Continue to stay well-hydrated. Continue to avoid nephrotoxic agents including contrast dye if possible.     Lab Results   Component Value Date    EGFR 39 06/02/2023    EGFR 36 03/02/2023    EGFR 39 01/30/2023    CREATININE 1.58 (H) 06/02/2023    CREATININE 1.67 (H) 03/02/2023    CREATININE 1.57 (H) 01/30/2023

## 2023-11-15 LAB — METHYLMALONATE SERPL-SCNC: 475 NMOL/L (ref 0–378)

## 2023-11-29 ENCOUNTER — APPOINTMENT (OUTPATIENT)
Dept: LAB | Facility: MEDICAL CENTER | Age: 88
End: 2023-11-29
Payer: COMMERCIAL

## 2023-11-29 DIAGNOSIS — E78.00 PURE HYPERCHOLESTEROLEMIA: ICD-10-CM

## 2023-11-29 DIAGNOSIS — T47.1X5A ADVERSE EFFECT OF PROTON PUMP INHIBITOR: ICD-10-CM

## 2023-11-29 DIAGNOSIS — N18.32 STAGE 3B CHRONIC KIDNEY DISEASE (HCC): ICD-10-CM

## 2023-11-29 DIAGNOSIS — I10 ESSENTIAL HYPERTENSION: ICD-10-CM

## 2023-11-29 DIAGNOSIS — E11.3293 TYPE 2 DIABETES MELLITUS WITH BOTH EYES AFFECTED BY MILD NONPROLIFERATIVE RETINOPATHY WITHOUT MACULAR EDEMA, WITHOUT LONG-TERM CURRENT USE OF INSULIN (HCC): ICD-10-CM

## 2023-11-29 DIAGNOSIS — K21.9 GASTROESOPHAGEAL REFLUX DISEASE WITHOUT ESOPHAGITIS: ICD-10-CM

## 2023-11-29 DIAGNOSIS — E55.9 VITAMIN D DEFICIENCY: ICD-10-CM

## 2023-11-29 DIAGNOSIS — N25.81 SECONDARY HYPERPARATHYROIDISM OF RENAL ORIGIN (HCC): ICD-10-CM

## 2023-11-29 LAB
25(OH)D3 SERPL-MCNC: 47.2 NG/ML (ref 30–100)
ALBUMIN SERPL BCP-MCNC: 4.2 G/DL (ref 3.5–5)
ANION GAP SERPL CALCULATED.3IONS-SCNC: 10 MMOL/L
BUN SERPL-MCNC: 26 MG/DL (ref 5–25)
CALCIUM SERPL-MCNC: 9.6 MG/DL (ref 8.4–10.2)
CHLORIDE SERPL-SCNC: 104 MMOL/L (ref 96–108)
CO2 SERPL-SCNC: 26 MMOL/L (ref 21–32)
CREAT SERPL-MCNC: 1.57 MG/DL (ref 0.6–1.3)
CREAT UR-MCNC: 173.9 MG/DL
GFR SERPL CREATININE-BSD FRML MDRD: 39 ML/MIN/1.73SQ M
GLUCOSE P FAST SERPL-MCNC: 112 MG/DL (ref 65–99)
MICROALBUMIN UR-MCNC: 32.6 MG/L
MICROALBUMIN/CREAT 24H UR: 19 MG/G CREATININE (ref 0–30)
PHOSPHATE SERPL-MCNC: 3.7 MG/DL (ref 2.3–4.1)
POTASSIUM SERPL-SCNC: 4.6 MMOL/L (ref 3.5–5.3)
PTH-INTACT SERPL-MCNC: 100.5 PG/ML (ref 12–88)
SODIUM SERPL-SCNC: 140 MMOL/L (ref 135–147)

## 2023-11-29 PROCEDURE — 82043 UR ALBUMIN QUANTITATIVE: CPT

## 2023-11-29 PROCEDURE — 80069 RENAL FUNCTION PANEL: CPT

## 2023-11-29 PROCEDURE — 82570 ASSAY OF URINE CREATININE: CPT

## 2023-11-29 PROCEDURE — 83970 ASSAY OF PARATHORMONE: CPT

## 2023-11-29 PROCEDURE — 82306 VITAMIN D 25 HYDROXY: CPT

## 2023-11-29 PROCEDURE — 36415 COLL VENOUS BLD VENIPUNCTURE: CPT

## 2023-12-05 ENCOUNTER — OFFICE VISIT (OUTPATIENT)
Dept: NEPHROLOGY | Facility: CLINIC | Age: 88
End: 2023-12-05
Payer: COMMERCIAL

## 2023-12-05 VITALS
WEIGHT: 141 LBS | HEIGHT: 69 IN | DIASTOLIC BLOOD PRESSURE: 78 MMHG | BODY MASS INDEX: 20.88 KG/M2 | SYSTOLIC BLOOD PRESSURE: 138 MMHG

## 2023-12-05 DIAGNOSIS — I10 ESSENTIAL HYPERTENSION: ICD-10-CM

## 2023-12-05 DIAGNOSIS — E11.3293 TYPE 2 DIABETES MELLITUS WITH BOTH EYES AFFECTED BY MILD NONPROLIFERATIVE RETINOPATHY WITHOUT MACULAR EDEMA, WITHOUT LONG-TERM CURRENT USE OF INSULIN (HCC): ICD-10-CM

## 2023-12-05 DIAGNOSIS — N18.32 STAGE 3B CHRONIC KIDNEY DISEASE (HCC): Primary | ICD-10-CM

## 2023-12-05 DIAGNOSIS — C15.5 PRIMARY ADENOCARCINOMA OF DISTAL THIRD OF ESOPHAGUS (HCC): ICD-10-CM

## 2023-12-05 DIAGNOSIS — E78.00 PURE HYPERCHOLESTEROLEMIA: ICD-10-CM

## 2023-12-05 DIAGNOSIS — N25.81 SECONDARY HYPERPARATHYROIDISM OF RENAL ORIGIN (HCC): ICD-10-CM

## 2023-12-05 DIAGNOSIS — E55.9 VITAMIN D DEFICIENCY: ICD-10-CM

## 2023-12-05 PROCEDURE — 99214 OFFICE O/P EST MOD 30 MIN: CPT | Performed by: INTERNAL MEDICINE

## 2023-12-05 RX ORDER — AMLODIPINE BESYLATE 5 MG/1
5 TABLET ORAL DAILY
Qty: 90 TABLET | Refills: 3 | Status: SHIPPED | OUTPATIENT
Start: 2023-12-05

## 2023-12-05 RX ORDER — CALCITRIOL 0.25 UG/1
CAPSULE, LIQUID FILLED ORAL
Qty: 45 CAPSULE | Refills: 4 | Status: SHIPPED | OUTPATIENT
Start: 2023-12-06

## 2023-12-05 NOTE — PROGRESS NOTES
Nephrology Follow up Consultation  Louise Rodriguez 82 y.o. male MRN: [id number]            BACKGROUND:  Louise Rodriguez is a 82 y.o.male who was referred by Danni Herrera MD for evaluation of Follow-up, Chronic Kidney Disease, and Hypertension  . ASSESSMENT / PLAN:   82 y.o.  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 CKD stage 3B:  - After review of records in [facility] as well as Care everywhere patient had a baseline creatinine of 0.9-1.1 mg/dL up until April 2021 and then since January 2022 creatinine has been around 1.3-1.6 mg/dL. Most recent labs show a Creatinine of 1.57 mg/dL on 11/29/23. Renal function remains stable at baseline.    - likely has underlying CKD secondary to age-related nephron loss plus hypertensive nephrosclerosis plus diabetic kidney disease (evidence of retinopathy) plus age-related nephron loss plus repeated IV contrast exposure plus some degree of underestimation of renal function based on current MDRD formula. - CT abdomen with IV contrast from 09/14/2022 showing no hydronephrosis simple solitary cysts on both kidneys. - Acid base and lytes stable,  - Clinically the patient appears to be euvolemic  - Recommend to avoid use of NSAIDs, nephrotoxins. Caution advised with regards to exposure to IV contrast dye.   - Discussed with the patient in depth his renal status, including the possible etiologies for CKD.    - Advised the patient that when his GFR is close to 20mL/min then will start discussing about RRT(renal replacement therapy) options such as renal transplant, peritoneal dialysis and hemodialysis. - Informed the patient about the various options for Renal Replacement therapy. - Discussed with the patient how we need to work together to delay the progression of CKD with optimal BP control based on their age and co-morbidities, optimal BS control with HbA1c of <7% and trying to reduce proteinuria by the use of anti-proteinuric agents. -Referral to CKD education/kidney smart placed on 2/14/2023, has attended and found it very helpful    Hypertension:  BP Readings from Last 3 Encounters:   12/05/23 138/78   11/14/23 138/74   07/24/23 108/64     - Patient is on losartan 25 mg p.o. q.day, Norvasc 5 mg p.o. QHS  - no changes for today  - Goal BP of <  130/80 based on age and comorbidities  - Instructed to follow low sodium (2gm)diet.  - Advised to hold ACEI/ARBs if patient suffers from dehydration due to gastrointestinal losses due to risk of AMOL secondary to failure to autoregulate. Hemoglobin/anemia:  - Goal Hb of 10-12 g/dL  - Most recent labs suggestive of 11 grams/deciliter. - on B12 shots, ferrous sulfate, no changes for now  -Continue p.o. iron for now    CKD/MBD(Mineral Bone Disease)/vitamin-D deficiency/secondary hyperparathyroidism of renal origin:  - Based on patients CKD stage following is the goal of therapy. - Maintain calcium phosphorus product of < 55.  - Stage 3 CKD - Goal Ca 8.5-10 mg/dL , goal Phos 2.7-4.6 mg/dL  , goal iPTH 30-70 pg/mL  - Continue patient on vitamin-D 1000 units p.o. q.day and increase to Calcitrol 0.25 mcg 5 times a week.    -Most recent vitamin D level 47.2 and intact .5 as of 11/29/23  - check intact PTH vitamin-D prior to next visit    Proteinuria:  - proteinuria most likely secondary to diabetic kidney disease.    - most recent UA from 10/3/2022 no blood trace protein  -Recent protein creatinine ratio 190 mg as of January 2023 stable  -Most recent micro and creatinine ratio 19 mg/dL as of 11/29/23  - check protein creatinine ratio  - currently on therapy for proteinuria with vitamin-D, Zocor, losartan    Lipids:  - on Zocor  - goal LDL less than 70  - Management as per PCP    DM:  - management as per Primary team  - on metformin  - most recent A1c 6.3% as of 7/12/23  - advised patient when and if renal parameters continue to deteriorate he may need to be taken off of metformin  - current dosage of 500 mg p.o. q.day from metformin is okay for his renal function  - positive evidence of mild diabetic retinopathy as of eye exam from 05/06/2021    Adenocarcinoma distal 3rd of esophagus:  - Management as per primary team  - follow-up with Oncology, last seen 3/31/2023 notes reviewed. - follow-up with thoracic surgery at [facility]. - on Prilosec  - discuss implications of long-term PPI usage for CKD in patients with the patient  - needs yearly surveillance scans recommend if possible to avoid IV contrast usage or if it is totally necessary than to hydrate patient and make sure appropriate medications are on hold to avoid nephrotoxicity    BPH:  - management primary team  - on Proscar and Flomax  - follow-up with Urology    Nutrition:  - Encouraged patient to follow a renal diet comprising of moderate potassium, low phosphorus and protein restriction to 0.8gm/kg. - Will check serum albumin with next blood work. Followup:  - Patient is to follow-up in 6 months, with lab work to be performed in a few days prior to the next visit. Advised patient to call me in 10 days to review the results if they do not hear back from me, as I may have not received the results. Bria Bhatti MD, [facility], 12/5/2023, 2:28 PM             SUBJECTIVE: 82 y.o. male presents to the office for routine follow-up.   Feels well has no complaints no recent hospitalization no issues with edema since last visit losartan dosage was decreased per PCP he has been relatively stable with SBP in the 130s no issues with edema thankful for the care information that is gone today happy to hear parameters are all stable requesting refills for the Norvasc agreeable to increasing calcitriol since last visit as per spouse they have attended kidney smart found really helpful. Review of Systems   Constitutional:  Negative for chills and fever. HENT:  Negative for congestion and sore throat. Respiratory:  Negative for cough and wheezing. Cardiovascular:  Negative for leg swelling. Gastrointestinal:  Negative for constipation and diarrhea. Genitourinary:  Negative for difficulty urinating, dysuria and hematuria. Musculoskeletal:  Negative for back pain. Neurological:  Negative for dizziness, light-headedness and headaches. All other systems reviewed and are negative. PAST MEDICAL HISTORY:  Past Medical History:   Diagnosis Date   • AMOL (acute kidney injury) ([address]) 09/27/2022   • Anemia    • Castellanos's esophagus without dysplasia 2006   • BPH with obstruction/lower urinary tract symptoms    • Cancer ([address] [facility]) 2019   • Chronic GERD 12/27/2017   • Chronic pain disorder     lumbo sacral, pt had surg and pain is improved   • Colon polyp 2006   • Cough    • Diabetes mellitus (HCC)    • Diverticulosis    • Elevated PSA    • Erectile dysfunction    • Esophageal adenocarcinoma ([address]) 12/2019   • Frequency of micturition    • GERD (gastroesophageal reflux disease)    • Hiatal hernia    • History of Helicobacter pylori infection 2006   • Hyperlipidemia    • Hypertension    • Paroxysmal atrial fibrillation ([address] [facility]) 06/10/2020    Last Assessment & Plan:  [facility]lympadenectomy rate rhythm control with diltiazem and amiodarone. No AC started. - Amio washout completed, 2 week Zio patch monitor to r/o afib recommended, this was explained in detail with pt and his wife.    - Educated on the S/S of atrial fibrillation, TIA, CVA and he is instructed to call for any heart racing or    • Tobacco use disorder 12/22/2010    Last Assessment & Plan:  Pt has been smoking less than 3 pp week not interested in quitting    • Weight loss 06/10/2020    Last Assessment & Plan:  - Unplanned weight loss accompanied by poor appetite and poor po intake since surgery - He has visit with surgeon tomorrow- advised to discuss his symptoms/weight loss - Recommended close follow up with PCP as well. PROBLEM LIST    Patient Active Problem List   Diagnosis   • GERD (gastroesophageal reflux disease)   • Type 2 diabetes mellitus with both eyes affected by mild nonproliferative retinopathy without macular edema, without long-term current use of insulin (HCC)   • BPH with urinary obstruction   • Erectile dysfunction   • Essential hypertension   • Hyperlipidemia   • Frequency of urination   • Lumbar radiculopathy, chronic   • Iron deficiency anemia secondary to inadequate dietary iron intake   • Primary adenocarcinoma of distal third of esophagus (HCC)   • SVT (supraventricular tachycardia)   • RBBB   • Vitamin B12 deficiency   • Stage 3b chronic kidney disease ([address])   • Secondary hyperparathyroidism of renal origin ([address])   • Vitamin D deficiency   • History of malignant neoplasm of skin       PAST SURGICAL HISTORY:  Past Surgical History:   Procedure Laterality Date   • APPENDECTOMY     • BACK SURGERY     • CATARACT EXTRACTION Bilateral    • COLONOSCOPY  06/2011    diverticulosis   • COLONOSCOPY  11/2015    diverticulosis   • EGD  12/2019    distal esophageal adenocarcinoma   • EGD  05/2021    status post esophagus resection, esophagogastric anastomosis appears normal.  Dilated to 50 Albanian   • ESOPHAGECTOMY  05/2020    at [facility] following chemoradiation for T3N0M0 adenocarcinoma   • LUMBAR LAMINECTOMY     • MASTOID SURGERY     • NASAL SEPTUM SURGERY     • VASECTOMY         SOCIAL HISTORY :   reports that he quit smoking about 3 years ago. His smoking use included cigarettes. He started smoking about 66 years ago. He has a 60.00 pack-year smoking history.  He has never used smokeless tobacco. He reports current alcohol use of about 1.0 standard drink of alcohol per week. He reports that he does not use drugs. FAMILY HISTORY:  Family History   Problem Relation Age of Onset   • Heart disease Mother    • Hypertension Mother    • Kidney disease Father    • Colon cancer Father    • Cancer Father         Colon   • Leukemia Cousin        ALLERGIES:  No Known Allergies        PHYSICAL EXAM:  Vitals:    12/05/23 1359   BP: 138/78   BP Location: Left arm   Patient Position: Sitting   Cuff Size: Standard   Weight: 64 kg (141 lb)   Height: 5' 9" (1.753 m)       Body mass index is 20.82 kg/m². Physical Exam  Vitals reviewed. Constitutional:       General: He is not in acute distress. Appearance: Normal appearance. He is not ill-appearing, toxic-appearing or diaphoretic. HENT:      Head: Normocephalic and atraumatic. Mouth/Throat:      Pharynx: No oropharyngeal exudate. Eyes:      General: No scleral icterus. Cardiovascular:      Rate and Rhythm: Normal rate. Pulmonary:      Effort: No respiratory distress. Breath sounds: Normal breath sounds. No stridor. Abdominal:      General: There is no distension. Palpations: Abdomen is soft. There is no mass. Tenderness: There is no abdominal tenderness. There is no right CVA tenderness or left CVA tenderness. Musculoskeletal:         General: No swelling. Cervical back: Normal range of motion. No rigidity. Skin:     Coloration: Skin is not jaundiced. Neurological:      General: No focal deficit present. Mental Status: He is alert and oriented to person, place, and time.    Psychiatric:         Mood and Affect: Mood normal.         Behavior: Behavior normal.         LABORATORY DATA:     Results from last 6 Months   Lab Units 11/29/23  0843 11/10/23  0844   WBC Thousand/uL  --  5.75   HEMOGLOBIN g/dL  --  11.0*   HEMATOCRIT %  --  34.0*   PLATELETS Thousands/uL  --  199   POTASSIUM mmol/L 4.6  --    CHLORIDE mmol/L 104  -- CO2 mmol/L 26  --    BUN mg/dL 26*  --    CREATININE mg/dL 1.57*  --    CALCIUM mg/dL 9.6  --    PHOSPHORUS mg/dL 3.7  --           rest all reviewed    RADIOLOGY:  No orders to display     Rest all reviewed        MEDICATIONS:    Current Outpatient Medications:   •  amLODIPine (NORVASC) 5 mg tablet, Take 1 tablet (5 mg total) by mouth daily, Disp: 90 tablet, Rfl: 3  •  [START ON 12/6/2023] calcitriol (ROCALTROL) 0.25 mcg capsule, Take one tablet five days a week Do not start before December 6, 2023., Disp: 45 capsule, Rfl: 4  •  Cholecalciferol 25 MCG (1000 UT) capsule, Take 1,000 Units by mouth daily, Disp: , Rfl:   •  Ferrous Sulfate (FEOSOL PO), Take 1 tablet by mouth daily, Disp: , Rfl:   •  finasteride (PROSCAR) 5 mg tablet, TAKE 1 TABLET BY MOUTH EVERY DAY, Disp: 90 tablet, Rfl: 3  •  losartan (COZAAR) 25 mg tablet, Take 1 tablet (25 mg total) by mouth daily, Disp: 30 tablet, Rfl: 5  •  metFORMIN (GLUCOPHAGE) 500 mg tablet, TAKE 1 TABLET BY MOUTH EVERY DAY WITH BREAKFAST, Disp: 90 tablet, Rfl: 1  •  omeprazole (PriLOSEC) 40 MG capsule, Take 1 capsule (40 mg total) by mouth 2 (two) times a day, Disp: 180 capsule, Rfl: 3  •  simvastatin (ZOCOR) 10 mg tablet, TAKE 1 TABLET BY MOUTH 4 TIMES WEEKLY, Disp: 48 tablet, Rfl: 3  •  tamsulosin (FLOMAX) 0.4 mg, TAKE 1 CAPSULE BY MOUTH EVERY DAY WITH DINNER, Disp: 90 capsule, Rfl: 1    Current Facility-Administered Medications:   •  cyanocobalamin injection 1,000 mcg, 1,000 mcg, Intramuscular, Q30 Days, Hunter Lambert MD, 1,000 mcg at 11/14/23 1022          Portions of the record may have been created with voice recognition software. Occasional wrong word or "sound a like" substitutions may have occurred due to the inherent limitations of voice recognition software. Read the chart carefully and recognize, using context, where substitutions have occurred. If you have any questions, please contact the dictating provider.

## 2023-12-05 NOTE — PATIENT INSTRUCTIONS
- increase calcitriol to 0.25mcg five days a week  - Please call me in 10 days after having your blood work done to review the results if you do not hear back from me or my office, as I may have not received the results. - please remember to perform blood work prior to the next visit. - Please call if the blood pressure top number is greater than 140 or less than 110 consistently. - Please call if you are gaining more than 2lbs in 2 days for adjustment of water pills.  ~ Please AVOID the following pain medications. LIST OF NSAIDS (NONSTEROIDAL ANTI-INFLAMMATORY DRUGS) AND GUZMÁN-2 INHIBITORS    DIFLUNISAL (DOLOBID)  IBUPROFEN (MOTRIN, ADVIL)  FLURBIPROFEN (ANSAID)  KETOPROFEN (ORUDIS, ORUVAIL)  FENOPROFEN (NALFON)  NABUMETONE (RELAFEN)  PIROXICAM (FELDENE)  NAPROXEN (ALEVE, NAPROSYN, NAPRELAN, ANAPROX)  DICLOFENAC (VOLTAREN, CATAFLAM)  INDOMETHACIN (INDOCIN)  SULINDAC (CLINORIL)  TOLMETIN (TOLETIN)  ETODOLAC (LODINE)  MELOXICAM (MOBIC)  KETOROLAC (TORADOL)  OXAPROZIN (DAYPRO)  CELECOXIB (CELEBREX)    Phosphorus diet  Follow a low phosphorus diet.     Avoid these higher phosphorus foods: Choose these lower phosphorus foods:   Milk, pudding or yogurt (from animals and from many soy varieties) Rice milk (unfortified), nondairy creamer (if it doesn't have terms in the ingredients list that contain the letters "phos")   Hard cheeses, ricotta or cottage cheese, fat-free cream cheese Regular and low-fat cream cheese   Ice cream or frozen yogurt Sherbet or frozen fruit pops   Soups made with higher phosphorus ingredients (milk, dried peas, beans, lentils) Soups made with lower phosphorus ingredients (broth- or water-based with other lower phosphorus ingredients)   Whole grains, including whole-grain breads, crackers, cereal, rice and pasta Refined grains, including white bread, crackers, cereals, rice and pasta   Quick breads, biscuits, cornbread, muffins, pancakes or waffles Homemade refined (white) dinner rolls, bagels or English muffins   Dried peas (split, black-eyed), beans (black, garbanzo, lima, kidney, navy, calzada) or lentils Green peas (canned, frozen), green beans or wax beans   Organ meats, walleye, pollock or sardines Lean beef, pork, lamb, poultry or other fish   Nuts and seeds Popcorn   Peanut butter and other nut butters Jam, jelly or honey   Chocolate, including chocolate drinks Carob (chocolate-flavored) candy, hard candy or gumdrops   Liset and pepper-type sodas, flavored arellano, bottled teas (if a term in the ingredients list contains the letters "phos") Lemon-lime soda, ginger ale or root beer, plain water   Follow a moderate potassium diet.

## 2023-12-14 ENCOUNTER — CLINICAL SUPPORT (OUTPATIENT)
Dept: FAMILY MEDICINE CLINIC | Facility: CLINIC | Age: 88
End: 2023-12-14
Payer: COMMERCIAL

## 2023-12-14 DIAGNOSIS — E53.8 VITAMIN B12 DEFICIENCY: Primary | ICD-10-CM

## 2023-12-14 PROCEDURE — 96372 THER/PROPH/DIAG INJ SC/IM: CPT

## 2023-12-14 RX ADMIN — CYANOCOBALAMIN 1000 MCG: 1000 INJECTION, SOLUTION INTRAMUSCULAR; SUBCUTANEOUS at 13:26

## 2023-12-25 DIAGNOSIS — I10 ESSENTIAL HYPERTENSION: ICD-10-CM

## 2023-12-25 DIAGNOSIS — N18.32 STAGE 3B CHRONIC KIDNEY DISEASE (HCC): ICD-10-CM

## 2023-12-26 RX ORDER — LOSARTAN POTASSIUM 25 MG/1
25 TABLET ORAL DAILY
Qty: 30 TABLET | Refills: 5 | Status: SHIPPED | OUTPATIENT
Start: 2023-12-26

## 2024-01-11 ENCOUNTER — CLINICAL SUPPORT (OUTPATIENT)
Dept: FAMILY MEDICINE CLINIC | Facility: CLINIC | Age: 89
End: 2024-01-11
Payer: COMMERCIAL

## 2024-01-11 DIAGNOSIS — E53.8 VITAMIN B12 DEFICIENCY: Primary | ICD-10-CM

## 2024-01-11 PROCEDURE — 96372 THER/PROPH/DIAG INJ SC/IM: CPT

## 2024-01-11 RX ADMIN — CYANOCOBALAMIN 1000 MCG: 1000 INJECTION, SOLUTION INTRAMUSCULAR; SUBCUTANEOUS at 13:45

## 2024-01-13 DIAGNOSIS — E11.9 TYPE 2 DIABETES MELLITUS WITHOUT COMPLICATION, WITHOUT LONG-TERM CURRENT USE OF INSULIN (HCC): ICD-10-CM

## 2024-02-14 ENCOUNTER — CLINICAL SUPPORT (OUTPATIENT)
Dept: FAMILY MEDICINE CLINIC | Facility: CLINIC | Age: 89
End: 2024-02-14
Payer: COMMERCIAL

## 2024-02-14 DIAGNOSIS — E53.8 VITAMIN B12 DEFICIENCY: Primary | ICD-10-CM

## 2024-02-14 PROCEDURE — 96372 THER/PROPH/DIAG INJ SC/IM: CPT

## 2024-02-14 RX ADMIN — CYANOCOBALAMIN 1000 MCG: 1000 INJECTION, SOLUTION INTRAMUSCULAR; SUBCUTANEOUS at 13:13

## 2024-02-14 NOTE — PROGRESS NOTES
Dayday Buckley is in the office today to receive b12 injection/vaccine. Pt handled the procedure well with no complaints and was able to leave the office in no distress.

## 2024-02-26 DIAGNOSIS — E55.9 VITAMIN D DEFICIENCY: ICD-10-CM

## 2024-02-26 DIAGNOSIS — E78.00 PURE HYPERCHOLESTEROLEMIA: ICD-10-CM

## 2024-02-26 DIAGNOSIS — N18.32 STAGE 3B CHRONIC KIDNEY DISEASE (HCC): ICD-10-CM

## 2024-02-26 DIAGNOSIS — C15.5 PRIMARY ADENOCARCINOMA OF DISTAL THIRD OF ESOPHAGUS (HCC): ICD-10-CM

## 2024-02-26 DIAGNOSIS — N25.81 SECONDARY HYPERPARATHYROIDISM OF RENAL ORIGIN (HCC): ICD-10-CM

## 2024-02-26 DIAGNOSIS — E11.3293 TYPE 2 DIABETES MELLITUS WITH BOTH EYES AFFECTED BY MILD NONPROLIFERATIVE RETINOPATHY WITHOUT MACULAR EDEMA, WITHOUT LONG-TERM CURRENT USE OF INSULIN (HCC): ICD-10-CM

## 2024-02-26 DIAGNOSIS — I10 ESSENTIAL HYPERTENSION: ICD-10-CM

## 2024-02-26 NOTE — TELEPHONE ENCOUNTER
----- Message from Dayday Buckley sent at 2/25/2024  2:25 PM EST -----  Regarding: Amlodipine refill needed  Contact: [phone]  Dear Dr. Rosenberg,  My  Dayday Buckley (1935)  needs a refill of Amlodipine 5mg. Please call [facility]'s pharmacy at [phone]. The refill generally has a quantity of 90 with 3 refills available. If you have any questions, please call me at [phone].  Thank you.  Kanika Buckley

## 2024-02-27 RX ORDER — AMLODIPINE BESYLATE 5 MG/1
5 TABLET ORAL DAILY
Qty: 90 TABLET | Refills: 3 | Status: SHIPPED | OUTPATIENT
Start: 2024-02-27

## 2024-03-11 ENCOUNTER — RA CDI HCC (OUTPATIENT)
Dept: OTHER | Facility: HOSPITAL | Age: 89
End: 2024-03-11

## 2024-03-15 ENCOUNTER — RA CDI HCC (OUTPATIENT)
Dept: OTHER | Facility: HOSPITAL | Age: 89
End: 2024-03-15

## 2024-03-15 PROBLEM — N18.32 HYPERTENSIVE KIDNEY DISEASE WITH STAGE 3B CHRONIC KIDNEY DISEASE (HCC): Status: ACTIVE | Noted: 2024-03-15

## 2024-03-15 PROBLEM — E11.22 TYPE 2 DIABETES MELLITUS WITH CHRONIC KIDNEY DISEASE, WITHOUT LONG-TERM CURRENT USE OF INSULIN (HCC): Status: ACTIVE | Noted: 2024-03-15

## 2024-03-15 PROBLEM — I12.9 HYPERTENSIVE KIDNEY DISEASE WITH STAGE 3B CHRONIC KIDNEY DISEASE (HCC): Status: ACTIVE | Noted: 2024-03-15

## 2024-03-20 ENCOUNTER — OFFICE VISIT (OUTPATIENT)
Dept: FAMILY MEDICINE CLINIC | Facility: CLINIC | Age: 89
End: 2024-03-20
Payer: COMMERCIAL

## 2024-03-20 VITALS
HEIGHT: 69 IN | HEART RATE: 56 BPM | WEIGHT: 142 LBS | DIASTOLIC BLOOD PRESSURE: 52 MMHG | BODY MASS INDEX: 21.03 KG/M2 | SYSTOLIC BLOOD PRESSURE: 120 MMHG

## 2024-03-20 DIAGNOSIS — K21.9 GASTROESOPHAGEAL REFLUX DISEASE WITHOUT ESOPHAGITIS: ICD-10-CM

## 2024-03-20 DIAGNOSIS — Z00.00 MEDICARE ANNUAL WELLNESS VISIT, SUBSEQUENT: ICD-10-CM

## 2024-03-20 DIAGNOSIS — N13.8 BPH WITH URINARY OBSTRUCTION: ICD-10-CM

## 2024-03-20 DIAGNOSIS — E11.22 TYPE 2 DIABETES MELLITUS WITH CHRONIC KIDNEY DISEASE, WITHOUT LONG-TERM CURRENT USE OF INSULIN, UNSPECIFIED CKD STAGE (HCC): Primary | ICD-10-CM

## 2024-03-20 DIAGNOSIS — N18.32 STAGE 3B CHRONIC KIDNEY DISEASE (HCC): ICD-10-CM

## 2024-03-20 DIAGNOSIS — N25.81 SECONDARY HYPERPARATHYROIDISM OF RENAL ORIGIN (HCC): ICD-10-CM

## 2024-03-20 DIAGNOSIS — N40.1 BPH WITH URINARY OBSTRUCTION: ICD-10-CM

## 2024-03-20 DIAGNOSIS — C15.5 PRIMARY ADENOCARCINOMA OF DISTAL THIRD OF ESOPHAGUS (HCC): ICD-10-CM

## 2024-03-20 DIAGNOSIS — E53.8 VITAMIN B12 DEFICIENCY: ICD-10-CM

## 2024-03-20 LAB — SL AMB POCT HEMOGLOBIN AIC: 6.3 (ref ?–6.5)

## 2024-03-20 PROCEDURE — G0439 PPPS, SUBSEQ VISIT: HCPCS | Performed by: FAMILY MEDICINE

## 2024-03-20 PROCEDURE — 83036 HEMOGLOBIN GLYCOSYLATED A1C: CPT | Performed by: FAMILY MEDICINE

## 2024-03-20 PROCEDURE — 96372 THER/PROPH/DIAG INJ SC/IM: CPT | Performed by: FAMILY MEDICINE

## 2024-03-20 PROCEDURE — 99214 OFFICE O/P EST MOD 30 MIN: CPT | Performed by: FAMILY MEDICINE

## 2024-03-20 RX ADMIN — CYANOCOBALAMIN 1000 MCG: 1000 INJECTION, SOLUTION INTRAMUSCULAR; SUBCUTANEOUS at 11:02

## 2024-03-20 NOTE — PROGRESS NOTES
Assessment and Plan:     Mr. Buckley is a pleasant and youthful 88-year-old gentleman who presents today to follow-up on chronic medical conditions.  He is also due for an AWV and hemoglobin A1c today.  He is feeling well and offers no acute complaints.  He is due for his 6-month CT surveillance for history of esophageal carcinoma.  Wife requests it be done after April 8th.        1. Type 2 diabetes mellitus with chronic kidney disease, without long-term current use of insulin, unspecified CKD stage (HCC)  Assessment & Plan:  Sugars are well-controlled.  Continue on metformin.    Lab Results   Component Value Date    HGBA1C 6.3 03/20/2024     Orders:  -     POCT hemoglobin A1c    2. Stage 3b chronic kidney disease (HCC)  Assessment & Plan:  Labs are stable.  Continue stay well-hydrated and to avoid nephrotoxic agents and contrast dye.  He will continue with his 6-month CT surveillance without contrast.    Lab Results   Component Value Date    EGFR 39 11/29/2023    EGFR 39 06/02/2023    EGFR 36 03/02/2023    CREATININE 1.57 (H) 11/29/2023    CREATININE 1.58 (H) 06/02/2023    CREATININE 1.67 (H) 03/02/2023     Orders:  -     CT chest abdomen pelvis wo contrast; Future; Expected date: 03/21/2024    3. Secondary hyperparathyroidism of renal origin (HCC)  Assessment & Plan:  Continue follow-up with nephrology.      4. Gastroesophageal reflux disease without esophagitis  Assessment & Plan:  Symptoms are controlled on Prilosec 40 mg twice daily.      5. BPH with urinary obstruction  Assessment & Plan:  Symptoms stable on tamsulosin and finasteride. Dr. Crane.      6. Vitamin B12 deficiency  Assessment & Plan:  Patient continues on IM B12 supplementation monthly.  B12 is within normal limits.        7. 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  He is maintaining his weight and remains active, exercising regularly.  He remains on iron supplements and  IM B12 supplementation due to malabsorption.  Continue omeprazole 40 mg daily.    6 month CT ordered - no contrast.    Orders:  -     CT chest abdomen pelvis wo contrast; Future; Expected date: 03/21/2024    8. Medicare annual wellness visit, subsequent  Comments:  Labs up-to-date.  Advanced directives discussed and literature given.  Immunizations reviewed.        Problem List Items Addressed This Visit          Digestive    GERD (gastroesophageal reflux disease)     Symptoms are controlled on Prilosec 40 mg twice daily.         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  He is maintaining his weight and remains active, exercising regularly.  He remains on iron supplements and IM B12 supplementation due to malabsorption.  Continue omeprazole 40 mg daily.    6 month CT ordered - no contrast.         Relevant Orders    CT chest abdomen pelvis wo contrast       Endocrine    Secondary hyperparathyroidism of renal origin ([facility])     Continue follow-up with nephrology.         Type 2 diabetes mellitus with chronic kidney disease, without long-term current use of insulin ([facility]) - Primary     Sugars are well-controlled.  Continue on metformin.    Lab Results   Component Value Date    HGBA1C 6.3 03/20/2024          Relevant Orders    POCT hemoglobin A1c (Completed)       Genitourinary    BPH with urinary obstruction     Symptoms stable on tamsulosin and finasteride. Dr. Crane.         Stage 3b chronic kidney disease (HCC)     Labs are stable.  Continue stay well-hydrated and to avoid nephrotoxic agents and contrast dye.  He will continue with his 6-month CT surveillance without contrast.    Lab Results   Component Value Date    EGFR 39 11/29/2023    EGFR 39 06/02/2023    EGFR 36 03/02/2023    CREATININE 1.57 (H) 11/29/2023    CREATININE 1.58 (H) 06/02/2023    CREATININE 1.67 (H) 03/02/2023          Relevant Orders    CT chest abdomen pelvis wo contrast       Other     Vitamin B12 deficiency     Patient continues on IM B12 supplementation monthly.  B12 is within normal limits.            Other Visit Diagnoses       Medicare annual wellness visit, subsequent        Labs up-to-date.  Advanced directives discussed and literature given.  Immunizations reviewed.            Depression Screening and Follow-up Plan: Patient was screened for depression during today's encounter. They screened negative with a PHQ-2 score of 0.      Preventive health issues were discussed with patient, and age appropriate screening tests were ordered as noted in patient's After Visit Summary.  Personalized health advice and appropriate referrals for health education or preventive services given if needed, as noted in patient's After Visit Summary.     History of Present Illness:     Chief Complaint   Patient presents with    Medication Management    Diabetes     He sees Dr. Solomon. Will call for report.    B12 Injection     Left deltoid       Patient presents for a Medicare Wellness Visit    Mr. Buckley is a pleasant and youthful 88-year-old gentleman who presents today to follow-up on chronic medical conditions.  He is also due for an AWV and hemoglobin A1c today.  He is feeling well and offers no acute complaints.  He is due for his 6-month CT surveillance for history of esophageal carcinoma.  Wife requests it be done after April 8th.         Patient Care Team:  Desirae Alarcon MD as PCP - General (Family Medicine)  MD Tess Almanza MD (Nephrology)     Review of Systems:     Review of Systems   Constitutional:         See HPI   HENT:  Negative for congestion, ear pain, mouth sores, sinus pressure and trouble swallowing.    Eyes:  Negative for discharge, redness and itching.   Respiratory:  Negative for apnea, cough, chest tightness, shortness of breath, wheezing and stridor.    Cardiovascular:  Negative for chest pain, palpitations and leg swelling.   Gastrointestinal:  Negative for  abdominal distention, abdominal pain, blood in stool, constipation, diarrhea, nausea and vomiting.   Endocrine: Negative for cold intolerance and heat intolerance.   Genitourinary:  Negative for difficulty urinating, dysuria, flank pain and urgency.   Musculoskeletal:  Negative for arthralgias and myalgias.   Skin:  Negative for rash.   Neurological:  Negative for dizziness, seizures, syncope, speech difficulty, weakness, light-headedness, numbness and headaches.   Hematological:  Negative for adenopathy.   Psychiatric/Behavioral:  Negative for agitation, behavioral problems, confusion and sleep disturbance. The patient is not nervous/anxious.         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)    RBBB    Vitamin B12 deficiency    Stage 3b chronic kidney disease (HCC)    Secondary hyperparathyroidism of renal origin (HCC)    Vitamin D deficiency    History of malignant neoplasm of skin    Type 2 diabetes mellitus with chronic kidney disease, without long-term current use of insulin (HCC)      Past Medical and Surgical History:     Past Medical History:   Diagnosis Date    AMOL (acute kidney injury) (HCC) 09/27/2022    Anemia     Castellanos's esophagus without dysplasia 2006    BPH with obstruction/lower urinary tract symptoms     Cancer (HCC) 2019    Chronic GERD 12/27/2017    Chronic pain disorder     lumbo sacral, pt had surg and pain is improved    Colon polyp 2006    Cough     Diabetes mellitus (HCC)     Diverticulosis     Elevated PSA     Erectile dysfunction     Esophageal adenocarcinoma (HCC) 12/2019    Frequency of micturition     GERD (gastroesophageal reflux disease)     Hiatal hernia     History  of Helicobacter pylori infection 2006    Hyperlipidemia     Hypertension     Paroxysmal atrial fibrillation (HCC) 06/10/2020    Last Assessment & Plan:  - Afib RVR post op day #6 at [facility] s/p thoracoscopy/lympadenectomy rate rhythm control with diltiazem and amiodarone. No AC started.  - Amio washout completed, 2 week Zio patch monitor to r/o afib recommended, this was explained in detail with pt and his wife.   - Educated on the S/S of atrial fibrillation, TIA, CVA and he is instructed to call for any heart racing or     Tobacco use disorder 12/22/2010    Last Assessment & Plan:  Pt has been smoking less than 3 pp week not interested in quitting     Weight loss 06/10/2020    Last Assessment & Plan:  - Unplanned weight loss accompanied by poor appetite and poor po intake since surgery - He has visit with surgeon tomorrow- advised to discuss his symptoms/weight loss - Recommended close follow up with PCP as well.     Past Surgical History:   Procedure Laterality Date    APPENDECTOMY      BACK SURGERY      CATARACT EXTRACTION Bilateral     COLONOSCOPY  06/2011    diverticulosis    COLONOSCOPY  11/2015    diverticulosis    EGD  12/2019    distal esophageal adenocarcinoma    EGD  05/2021    status post esophagus resection, esophagogastric anastomosis appears normal.  Dilated to 48 [place]    ESOPHAGECTOMY  05/2020    at [facility] following chemoradiation for T3N0M0 adenocarcinoma    LUMBAR LAMINECTOMY      MASTOID SURGERY      NASAL SEPTUM SURGERY      VASECTOMY        Family History:     Family History   Problem Relation Age of Onset    Heart disease Mother     Hypertension Mother     Kidney disease Father     Colon cancer Father     Cancer Father         Colon    Leukemia Cousin       Social History:     Social History     Socioeconomic History    Marital status: /Civil Union     Spouse name: None    Number of children: None    Years of education: None    Highest education level: None   Occupational History     Occupation: Retired - Business Owner   Tobacco Use    Smoking status: Former     Current packs/day: 0.00     Average packs/day: 1 pack/day for 62.7 years (62.7 ttl pk-yrs)     Types: Cigarettes     Start date: 1957     Quit date: 3/1/2020     Years since quittin.0    Smokeless tobacco: Never   Vaping Use    Vaping status: Never Used   Substance and Sexual Activity    Alcohol use: Yes     Alcohol/week: 1.0 standard drink of alcohol     Types: 1 Glasses of wine per week     Comment: 1 glass of wine per week    Drug use: No    Sexual activity: Not Currently     Partners: Female   Other Topics Concern    None   Social History Narrative    None     Social Determinants of Health     Financial Resource Strain: Low Risk  (3/8/2023)    Overall Financial Resource Strain (CARDIA)     Difficulty of Paying Living Expenses: Not hard at all   Food Insecurity: No Food Insecurity (3/20/2024)    Hunger Vital Sign     Worried About Running Out of Food in the Last Year: Never true     Ran Out of Food in the Last Year: Never true   Transportation Needs: No Transportation Needs (3/20/2024)    PRAPARE - Transportation     Lack of Transportation (Medical): No     Lack of Transportation (Non-Medical): No   Physical Activity: Not on file   Stress: Not on file   Social Connections: Not on file   Intimate Partner Violence: Not on file   Housing Stability: Unknown (3/20/2024)    Housing Stability Vital Sign     Unable to Pay for Housing in the Last Year: No     Number of Places Lived in the Last Year: Not on file     Unstable Housing in the Last Year: No      Medications and Allergies:     Current Outpatient Medications   Medication Sig Dispense Refill    amLODIPine (NORVASC) 5 mg tablet Take 1 tablet (5 mg total) by mouth daily 90 tablet 3    calcitriol (ROCALTROL) 0.25 mcg capsule Take one tablet five days a week Do not start before 2023. 45 capsule 4    Cholecalciferol 25 MCG (1000 UT) capsule Take 1,000 Units by mouth  daily      Ferrous Sulfate (FEOSOL PO) Take 1 tablet by mouth daily      finasteride (PROSCAR) 5 mg tablet TAKE 1 TABLET BY MOUTH EVERY DAY 90 tablet 3    losartan (COZAAR) 25 mg tablet TAKE 1 TABLET BY MOUTH EVERY DAY 30 tablet 5    metFORMIN (GLUCOPHAGE) 500 mg tablet TAKE 1 TABLET BY MOUTH EVERY DAY WITH BREAKFAST 90 tablet 1    omeprazole (PriLOSEC) 40 MG capsule Take 1 capsule (40 mg total) by mouth 2 (two) times a day 180 capsule 3    simvastatin (ZOCOR) 10 mg tablet TAKE 1 TABLET BY MOUTH 4 TIMES WEEKLY 48 tablet 3    tamsulosin (FLOMAX) 0.4 mg TAKE 1 CAPSULE BY MOUTH EVERY DAY WITH DINNER 90 capsule 1     Current Facility-Administered Medications   Medication Dose Route Frequency Provider Last Rate Last Admin    cyanocobalamin injection 1,000 mcg  1,000 mcg Intramuscular Q30 Days Desirae Alarcon MD   1,000 mcg at 03/20/24 1102     No Known Allergies   Immunizations:     Immunization History   Administered Date(s) Administered    COVID-19 MODERNA VACC 0.5 ML IM 01/11/2021, 02/08/2021, 08/27/2021, 04/28/2022    COVID-19 Moderna Vac BIVALENT 12 Yr+ IM 0.5 ML 12/02/2022    COVID-19 Moderna mRNA Vaccine 12 Yr+ 50 mcg/0.5 mL (Spikevax) 10/05/2023    INFLUENZA 12/27/2011, 09/23/2013, 10/29/2014, 10/01/2015, 11/17/2016, 10/17/2017, 10/30/2018, 10/21/2019    Influenza Split High Dose Preservative Free IM 10/01/2015, 11/17/2016, 10/17/2017, 10/30/2018    Influenza, Seasonal Vaccine, Quadrivalent, Adjuvanted, .5e 10/20/2023    Influenza, high dose seasonal 0.7 mL 09/15/2020    Pneumococcal Conjugate 13-Valent 04/07/2016    Pneumococcal Polysaccharide PPV23 12/22/2010    Tdap 04/07/2011      Health Maintenance:     There are no preventive care reminders to display for this patient.      Topic Date Due    COVID-19 Vaccine (7 - 2023-24 season) 11/30/2023      Medicare Screening Tests and Risk Assessments:     [facility] is here for his Subsequent Wellness visit.     Health Risk Assessment:   Patient rates overall health as good.  Patient feels that their physical health rating is slightly better. Patient is very satisfied with their life. Eyesight was rated as same. Hearing was rated as slightly worse. Patient feels that their emotional and mental health rating is same. Patients states they are never, rarely angry. Patient states they are sometimes unusually tired/fatigued. Pain experienced in the last 7 days has been none. Patient states that he has experienced no weight loss or gain in last 6 months.     Depression Screening:   PHQ-2 Score: 0      Fall Risk Screening:   In the past year, patient has experienced: no history of falling in past year      Home Safety:  Patient does not have trouble with stairs inside or outside of their home. Patient has working smoke alarms and has working carbon monoxide detector. Home safety hazards include: none.     Nutrition:   Current diet is Regular and No Added Salt.     Medications:   Patient is currently taking over-the-counter supplements. OTC medications include: see medication list. Patient is able to manage medications.     Activities of Daily Living (ADLs)/Instrumental Activities of Daily Living (IADLs):   Walk and transfer into and out of bed and chair?: Yes  Dress and groom yourself?: Yes    Bathe or shower yourself?: Yes    Feed yourself? Yes  Do your laundry/housekeeping?: Yes  Manage your money, pay your bills and track your expenses?: Yes  Make your own meals?: Yes    Do your own shopping?: Yes    Previous Hospitalizations:   Any hospitalizations or ED visits within the last 12 months?: No      Advance Care Planning:   Living will: Yes    Durable POA for healthcare: Yes    Advanced directive: Yes    Advanced directive counseling given: Yes      Cognitive Screening:   Provider or family/friend/caregiver concerned regarding cognition?: No    PREVENTIVE SCREENINGS      Cardiovascular Screening:    General: History Lipid Disorder and Screening Current      Diabetes Screening:     General:  "History Diabetes and Screening Current      Colorectal Cancer Screening:     General: Screening Not Indicated      Prostate Cancer Screening:    General: Screening Not Indicated      Osteoporosis Screening:    General: Screening Not Indicated      Abdominal Aortic Aneurysm (AAA) Screening:    Risk factors include: tobacco use        Lung Cancer Screening:     General: Screening Not Indicated      Hepatitis C Screening:    General: Screening Not Indicated    Screening, Brief Intervention, and Referral to Treatment (SBIRT)    Screening  Typical number of drinks in a day: 0  Typical number of drinks in a week: 1  Interpretation: Low risk drinking behavior.    AUDIT-C Screenin) How often did you have a drink containing alcohol in the past year? monthly or less  2) How many drinks did you have on a typical day when you were drinking in the past year? 1 to 2  3) How often did you have 6 or more drinks on one occasion in the past year? never    AUDIT-C Score: 1  Interpretation: Score 0-3 (male): Negative screen for alcohol misuse    Single Item Drug Screening:  How often have you used an illegal drug (including marijuana) or a prescription medication for non-medical reasons in the past year? never    Single Item Drug Screen Score: 0  Interpretation: Negative screen for possible drug use disorder    No results found.     Physical Exam:     /52   Pulse 56   Ht 5' 9\" (1.753 m)   Wt 64.4 kg (142 lb)   BMI 20.97 kg/m²     Physical Exam  Constitutional:       General: He is not in acute distress.     Appearance: He is well-developed. He is not ill-appearing.      Comments: Well-groomed, good historian.   HENT:      Head: Normocephalic and atraumatic.   Eyes:      General: No scleral icterus.  Neck:      Vascular: No carotid bruit.      Comments: No supraclavicular lymphadenopathy appreciated.  Cardiovascular:      Rate and Rhythm: Normal rate and regular rhythm.      Heart sounds: Normal heart sounds. No murmur " heard.     No friction rub. No gallop.   Pulmonary:      Effort: Pulmonary effort is normal. No respiratory distress.      Breath sounds: Normal breath sounds. No stridor. No wheezing, rhonchi or rales.   Chest:      Chest wall: No tenderness.   Abdominal:      General: Bowel sounds are normal. There is no distension.      Palpations: Abdomen is soft. There is no mass.      Tenderness: There is no abdominal tenderness. There is no guarding or rebound.      Hernia: No hernia is present.   Musculoskeletal:         General: Normal range of motion.      Cervical back: Normal range of motion and neck supple.      Right lower leg: No edema.      Left lower leg: No edema.   Lymphadenopathy:      Cervical: No cervical adenopathy.   Skin:     General: Skin is warm and dry.      Comments: Ecchymoses on L forearm.   Neurological:      General: No focal deficit present.      Mental Status: He is alert and oriented to person, place, and time.   Psychiatric:         Mood and Affect: Mood normal.         Behavior: Behavior normal.         Thought Content: Thought content normal.         Judgment: Judgment normal.          Desirae Alarcon MD

## 2024-03-21 PROBLEM — I47.10 SVT (SUPRAVENTRICULAR TACHYCARDIA): Status: RESOLVED | Noted: 2020-09-22 | Resolved: 2024-03-21

## 2024-03-21 NOTE — ASSESSMENT & PLAN NOTE
Labs are stable.  Continue stay well-hydrated and to avoid nephrotoxic agents and contrast dye.  He will continue with his 6-month CT surveillance without contrast.    Lab Results   Component Value Date    EGFR 39 11/29/2023    EGFR 39 06/02/2023    EGFR 36 03/02/2023    CREATININE 1.57 (H) 11/29/2023    CREATININE 1.58 (H) 06/02/2023    CREATININE 1.67 (H) 03/02/2023

## 2024-03-21 NOTE — ASSESSMENT & PLAN NOTE
Patient is feeling well at present.  He is maintaining his weight and remains active, exercising regularly.  He remains on iron supplements and IM B12 supplementation due to malabsorption.  Continue omeprazole 40 mg daily.    6 month CT ordered - no contrast.

## 2024-03-21 NOTE — ASSESSMENT & PLAN NOTE
Sugars are well-controlled.  Continue on metformin.    Lab Results   Component Value Date    HGBA1C 6.3 03/20/2024

## 2024-03-31 DIAGNOSIS — N40.1 BPH WITH URINARY OBSTRUCTION: ICD-10-CM

## 2024-03-31 DIAGNOSIS — N13.8 BPH WITH URINARY OBSTRUCTION: ICD-10-CM

## 2024-04-01 RX ORDER — TAMSULOSIN HYDROCHLORIDE 0.4 MG/1
CAPSULE ORAL
Qty: 30 CAPSULE | Refills: 0 | Status: SHIPPED | OUTPATIENT
Start: 2024-04-01

## 2024-04-09 DIAGNOSIS — C15.5 PRIMARY ADENOCARCINOMA OF DISTAL THIRD OF ESOPHAGUS (HCC): Primary | ICD-10-CM

## 2024-04-19 ENCOUNTER — TELEPHONE (OUTPATIENT)
Age: 89
End: 2024-04-19

## 2024-04-19 NOTE — TELEPHONE ENCOUNTER
Rcvd a call from [facility]. Calling to see if patient needs barium for CT scan scheduled for Sunday. Per notes,  Per Dr. Alarcon's note on 4/9, patient needs the barium.

## 2024-04-19 NOTE — TELEPHONE ENCOUNTER
Yes pt does need the Barium, I called and left mess to call back so if he has not been able to get barium yet, we can arrange for him to  at office before 4pm today

## 2024-04-21 ENCOUNTER — HOSPITAL ENCOUNTER (OUTPATIENT)
Dept: CT IMAGING | Facility: HOSPITAL | Age: 89
Discharge: HOME | End: 2024-04-21
Payer: COMMERCIAL

## 2024-04-21 DIAGNOSIS — C15.5 PRIMARY ADENOCARCINOMA OF DISTAL THIRD OF ESOPHAGUS (HCC): ICD-10-CM

## 2024-04-21 DIAGNOSIS — N18.32 STAGE 3B CHRONIC KIDNEY DISEASE (HCC): ICD-10-CM

## 2024-04-21 PROCEDURE — 71250 CT THORAX DX C-: CPT

## 2024-04-21 PROCEDURE — 74176 CT ABD & PELVIS W/O CONTRAST: CPT

## 2024-04-24 ENCOUNTER — CLINICAL SUPPORT (OUTPATIENT)
Dept: FAMILY MEDICINE CLINIC | Facility: CLINIC | Age: 89
End: 2024-04-24
Payer: COMMERCIAL

## 2024-04-24 DIAGNOSIS — E53.8 VITAMIN B12 DEFICIENCY: Primary | ICD-10-CM

## 2024-04-24 PROCEDURE — 96372 THER/PROPH/DIAG INJ SC/IM: CPT

## 2024-04-24 RX ADMIN — CYANOCOBALAMIN 1000 MCG: 1000 INJECTION, SOLUTION INTRAMUSCULAR; SUBCUTANEOUS at 13:24

## 2024-04-24 NOTE — PROGRESS NOTES
Monthly B12 inj given in Right delt  Patient tolerated injection well and left office in no distress.

## 2024-04-27 DIAGNOSIS — N40.1 BPH WITH URINARY OBSTRUCTION: ICD-10-CM

## 2024-04-27 DIAGNOSIS — N13.8 BPH WITH URINARY OBSTRUCTION: ICD-10-CM

## 2024-04-29 RX ORDER — TAMSULOSIN HYDROCHLORIDE 0.4 MG/1
CAPSULE ORAL
Qty: 90 CAPSULE | Refills: 1 | Status: SHIPPED | OUTPATIENT
Start: 2024-04-29

## 2024-05-17 DIAGNOSIS — N13.8 BPH WITH URINARY OBSTRUCTION: ICD-10-CM

## 2024-05-17 DIAGNOSIS — N40.1 BPH WITH URINARY OBSTRUCTION: ICD-10-CM

## 2024-05-17 RX ORDER — FINASTERIDE 5 MG/1
TABLET, FILM COATED ORAL
Qty: 90 TABLET | Refills: 3 | Status: SHIPPED | OUTPATIENT
Start: 2024-05-17

## 2024-05-20 ENCOUNTER — CLINICAL SUPPORT (OUTPATIENT)
Dept: FAMILY MEDICINE CLINIC | Facility: CLINIC | Age: 89
End: 2024-05-20
Payer: COMMERCIAL

## 2024-05-20 DIAGNOSIS — E53.8 VITAMIN B12 DEFICIENCY: Primary | ICD-10-CM

## 2024-05-20 PROCEDURE — 96372 THER/PROPH/DIAG INJ SC/IM: CPT

## 2024-05-20 RX ADMIN — CYANOCOBALAMIN 1000 MCG: 1000 INJECTION, SOLUTION INTRAMUSCULAR; SUBCUTANEOUS at 10:16

## 2024-05-21 ENCOUNTER — LAB (OUTPATIENT)
Dept: LAB | Facility: CLINIC | Age: 89
End: 2024-05-21
Payer: COMMERCIAL

## 2024-05-21 DIAGNOSIS — E11.3293 TYPE 2 DIABETES MELLITUS WITH BOTH EYES AFFECTED BY MILD NONPROLIFERATIVE RETINOPATHY WITHOUT MACULAR EDEMA, WITHOUT LONG-TERM CURRENT USE OF INSULIN (HCC): ICD-10-CM

## 2024-05-21 DIAGNOSIS — C15.5 PRIMARY ADENOCARCINOMA OF DISTAL THIRD OF ESOPHAGUS (HCC): ICD-10-CM

## 2024-05-21 DIAGNOSIS — N18.32 STAGE 3B CHRONIC KIDNEY DISEASE (HCC): ICD-10-CM

## 2024-05-21 DIAGNOSIS — I10 ESSENTIAL HYPERTENSION: ICD-10-CM

## 2024-05-21 DIAGNOSIS — E78.00 PURE HYPERCHOLESTEROLEMIA: ICD-10-CM

## 2024-05-21 DIAGNOSIS — N25.81 SECONDARY HYPERPARATHYROIDISM OF RENAL ORIGIN (HCC): ICD-10-CM

## 2024-05-21 DIAGNOSIS — E55.9 VITAMIN D DEFICIENCY: ICD-10-CM

## 2024-05-21 LAB
25(OH)D3 SERPL-MCNC: 45.1 NG/ML (ref 30–100)
ALBUMIN SERPL BCP-MCNC: 4 G/DL (ref 3.5–5)
ANION GAP SERPL CALCULATED.3IONS-SCNC: 6 MMOL/L (ref 4–13)
BUN SERPL-MCNC: 23 MG/DL (ref 5–25)
CALCIUM SERPL-MCNC: 9.2 MG/DL (ref 8.4–10.2)
CHLORIDE SERPL-SCNC: 105 MMOL/L (ref 96–108)
CO2 SERPL-SCNC: 30 MMOL/L (ref 21–32)
CREAT SERPL-MCNC: 1.47 MG/DL (ref 0.6–1.3)
CREAT UR-MCNC: 192.3 MG/DL
GFR SERPL CREATININE-BSD FRML MDRD: 42 ML/MIN/1.73SQ M
GLUCOSE P FAST SERPL-MCNC: 117 MG/DL (ref 65–99)
MAGNESIUM SERPL-MCNC: 2.1 MG/DL (ref 1.9–2.7)
MICROALBUMIN UR-MCNC: 14.1 MG/L
MICROALBUMIN/CREAT 24H UR: 7 MG/G CREATININE (ref 0–30)
PHOSPHATE SERPL-MCNC: 3.5 MG/DL (ref 2.3–4.1)
POTASSIUM SERPL-SCNC: 5 MMOL/L (ref 3.5–5.3)
PTH-INTACT SERPL-MCNC: 109.3 PG/ML (ref 12–88)
SODIUM SERPL-SCNC: 141 MMOL/L (ref 135–147)

## 2024-05-21 PROCEDURE — 80069 RENAL FUNCTION PANEL: CPT

## 2024-05-21 PROCEDURE — 83970 ASSAY OF PARATHORMONE: CPT

## 2024-05-21 PROCEDURE — 82306 VITAMIN D 25 HYDROXY: CPT

## 2024-05-21 PROCEDURE — 82043 UR ALBUMIN QUANTITATIVE: CPT

## 2024-05-21 PROCEDURE — 36415 COLL VENOUS BLD VENIPUNCTURE: CPT

## 2024-05-21 PROCEDURE — 82570 ASSAY OF URINE CREATININE: CPT

## 2024-05-22 ENCOUNTER — TELEPHONE (OUTPATIENT)
Dept: NEPHROLOGY | Facility: CLINIC | Age: 89
End: 2024-05-22

## 2024-05-22 NOTE — RESULT ENCOUNTER NOTE
Was suppose to be a June follow up, labs are done . Pls brenda follow up so we can discuss the results.   Thanks

## 2024-05-22 NOTE — TELEPHONE ENCOUNTER
----- Message from Tess Rosenberg MD sent at 5/22/2024 11:34 AM EDT -----  Was suppose to be a June follow up, labs are done . [facility] brenda follow up so we can discuss the results.   Thanks

## 2024-06-20 ENCOUNTER — CLINICAL SUPPORT (OUTPATIENT)
Dept: FAMILY MEDICINE CLINIC | Facility: CLINIC | Age: 89
End: 2024-06-20
Payer: COMMERCIAL

## 2024-06-20 DIAGNOSIS — E53.8 VITAMIN B12 DEFICIENCY: Primary | ICD-10-CM

## 2024-06-20 PROCEDURE — 96372 THER/PROPH/DIAG INJ SC/IM: CPT

## 2024-06-20 RX ADMIN — CYANOCOBALAMIN 1000 MCG: 1000 INJECTION, SOLUTION INTRAMUSCULAR; SUBCUTANEOUS at 13:46

## 2024-06-20 NOTE — PROGRESS NOTES
Monthly B12 injection given in Right delt  Patient tolerated injection well and left office in no distress.

## 2024-07-08 DIAGNOSIS — E11.9 TYPE 2 DIABETES MELLITUS WITHOUT COMPLICATION, WITHOUT LONG-TERM CURRENT USE OF INSULIN (HCC): ICD-10-CM

## 2024-07-18 ENCOUNTER — RA CDI HCC (OUTPATIENT)
Dept: OTHER | Facility: HOSPITAL | Age: 89
End: 2024-07-18

## 2024-07-18 RX ORDER — SODIUM CHLORIDE 9 MG/ML
125 INJECTION, SOLUTION INTRAVENOUS CONTINUOUS
Status: CANCELLED | OUTPATIENT
Start: 2024-07-18

## 2024-07-19 ENCOUNTER — HOSPITAL ENCOUNTER (OUTPATIENT)
Dept: GASTROENTEROLOGY | Facility: HOSPITAL | Age: 89
Setting detail: OUTPATIENT SURGERY
End: 2024-07-19
Attending: INTERNAL MEDICINE
Payer: COMMERCIAL

## 2024-07-19 ENCOUNTER — ANESTHESIA EVENT (OUTPATIENT)
Dept: GASTROENTEROLOGY | Facility: HOSPITAL | Age: 89
End: 2024-07-19

## 2024-07-19 ENCOUNTER — ANESTHESIA (OUTPATIENT)
Dept: GASTROENTEROLOGY | Facility: HOSPITAL | Age: 89
End: 2024-07-19

## 2024-07-19 VITALS
SYSTOLIC BLOOD PRESSURE: 143 MMHG | RESPIRATION RATE: 16 BRPM | OXYGEN SATURATION: 100 % | DIASTOLIC BLOOD PRESSURE: 65 MMHG | TEMPERATURE: 97.4 F | HEART RATE: 43 BPM

## 2024-07-19 DIAGNOSIS — C15.5 MALIGNANT NEOPLASM OF LOWER THIRD OF ESOPHAGUS (HCC): ICD-10-CM

## 2024-07-19 PROBLEM — J44.9 CHRONIC OBSTRUCTIVE PULMONARY DISEASE (HCC): Status: ACTIVE | Noted: 2024-07-19

## 2024-07-19 LAB — GLUCOSE SERPL-MCNC: 106 MG/DL (ref 65–140)

## 2024-07-19 PROCEDURE — 88312 SPECIAL STAINS GROUP 1: CPT | Performed by: PATHOLOGY

## 2024-07-19 PROCEDURE — 88305 TISSUE EXAM BY PATHOLOGIST: CPT | Performed by: PATHOLOGY

## 2024-07-19 PROCEDURE — 82948 REAGENT STRIP/BLOOD GLUCOSE: CPT

## 2024-07-19 RX ORDER — PROPOFOL 10 MG/ML
INJECTION, EMULSION INTRAVENOUS AS NEEDED
Status: DISCONTINUED | OUTPATIENT
Start: 2024-07-19 | End: 2024-07-19

## 2024-07-19 RX ORDER — SODIUM CHLORIDE 9 MG/ML
125 INJECTION, SOLUTION INTRAVENOUS CONTINUOUS
Status: DISCONTINUED | OUTPATIENT
Start: 2024-07-19 | End: 2024-07-23 | Stop reason: HOSPADM

## 2024-07-19 RX ADMIN — SODIUM CHLORIDE 125 ML/HR: 0.9 INJECTION, SOLUTION INTRAVENOUS at 09:13

## 2024-07-19 RX ADMIN — PROPOFOL 70 MG: 10 INJECTION, EMULSION INTRAVENOUS at 09:37

## 2024-07-19 NOTE — INTERVAL H&P NOTE
H&P reviewed. After examining the patient I find no changes in the patients condition since the H&P had been written.    Vitals:    07/19/24 0845   BP: 150/69   Pulse: (!) 46   Resp: 16   Temp: 97.8 °F (36.6 °C)   SpO2: 100%

## 2024-07-19 NOTE — ANESTHESIA PREPROCEDURE EVALUATION
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)      /RENAL   (+) BPH with urinary obstruction   (+) Stage 3b chronic kidney disease (HCC)      HEMATOLOGY   (+) Iron deficiency anemia secondary to inadequate dietary iron intake      PULMONARY   (+) Chronic obstructive pulmonary disease (HCC)      Behavioral Health   (+) Tobacco use disorder (Resolved)        Physical Exam    Airway    Mallampati score: II         Dental    upper dentures and lower dentures    Cardiovascular  Cardiovascular exam normal    Pulmonary  Pulmonary exam normal     Other Findings        Anesthesia Plan  ASA Score- 3     Anesthesia Type- IV sedation with anesthesia with ASA Monitors.         Additional Monitors:     Airway Plan:            Plan Factors-    Chart reviewed.  Imaging results reviewed. Existing labs reviewed. Patient summary reviewed.                  Induction- intravenous.    Postoperative Plan-         Informed Consent- Anesthetic plan and risks discussed with patient.

## 2024-07-19 NOTE — DISCHARGE INSTR - AVS FIRST PAGE
Please call [phone] with any problems.   Your exam today showed NO abnormality.  Repeat one year

## 2024-07-19 NOTE — ANESTHESIA POSTPROCEDURE EVALUATION
Post-Op Assessment Note    CV Status:  Stable    Pain management: adequate       Mental Status:  Alert and awake   Hydration Status:  Euvolemic   PONV Controlled:  Controlled   Airway Patency:  Patent     Post Op Vitals Reviewed: Yes    No anethesia notable event occurred.    Staff: Anesthesiologist               BP      Temp      Pulse     Resp      SpO2      /65   Pulse (!) 43   Temp (!) 97.4 °F (36.3 °C) (Temporal)   Resp 16   SpO2 100%

## 2024-07-23 PROCEDURE — 88312 SPECIAL STAINS GROUP 1: CPT | Performed by: PATHOLOGY

## 2024-07-23 PROCEDURE — 88305 TISSUE EXAM BY PATHOLOGIST: CPT | Performed by: PATHOLOGY

## 2024-07-24 ENCOUNTER — RA CDI HCC (OUTPATIENT)
Dept: OTHER | Facility: HOSPITAL | Age: 89
End: 2024-07-24

## 2024-07-24 PROBLEM — Z85.01 PERSONAL HISTORY OF ESOPHAGEAL CANCER: Status: ACTIVE | Noted: 2024-07-24

## 2024-07-24 PROBLEM — C15.5 PRIMARY ADENOCARCINOMA OF DISTAL THIRD OF ESOPHAGUS (HCC): Status: RESOLVED | Noted: 2019-12-09 | Resolved: 2024-07-24

## 2024-08-12 ENCOUNTER — TELEPHONE (OUTPATIENT)
Age: 89
End: 2024-08-12

## 2024-08-12 DIAGNOSIS — N25.81 SECONDARY HYPERPARATHYROIDISM OF RENAL ORIGIN (HCC): Primary | ICD-10-CM

## 2024-08-12 DIAGNOSIS — I10 ESSENTIAL HYPERTENSION: ICD-10-CM

## 2024-08-12 DIAGNOSIS — E55.9 VITAMIN D DEFICIENCY: ICD-10-CM

## 2024-08-26 ENCOUNTER — APPOINTMENT (OUTPATIENT)
Dept: LAB | Facility: CLINIC | Age: 89
End: 2024-08-26
Payer: COMMERCIAL

## 2024-08-26 DIAGNOSIS — I10 ESSENTIAL HYPERTENSION: ICD-10-CM

## 2024-08-26 DIAGNOSIS — E55.9 VITAMIN D DEFICIENCY: ICD-10-CM

## 2024-08-26 DIAGNOSIS — N25.81 SECONDARY HYPERPARATHYROIDISM OF RENAL ORIGIN (HCC): ICD-10-CM

## 2024-08-26 LAB
25(OH)D3 SERPL-MCNC: 42 NG/ML (ref 30–100)
ALBUMIN SERPL BCG-MCNC: 3.9 G/DL (ref 3.5–5)
ANION GAP SERPL CALCULATED.3IONS-SCNC: 9 MMOL/L (ref 4–13)
BUN SERPL-MCNC: 25 MG/DL (ref 5–25)
CALCIUM SERPL-MCNC: 9.1 MG/DL (ref 8.4–10.2)
CHLORIDE SERPL-SCNC: 106 MMOL/L (ref 96–108)
CO2 SERPL-SCNC: 26 MMOL/L (ref 21–32)
CREAT SERPL-MCNC: 1.55 MG/DL (ref 0.6–1.3)
GFR SERPL CREATININE-BSD FRML MDRD: 39 ML/MIN/1.73SQ M
GLUCOSE P FAST SERPL-MCNC: 103 MG/DL (ref 65–99)
PHOSPHATE SERPL-MCNC: 3.4 MG/DL (ref 2.3–4.1)
POTASSIUM SERPL-SCNC: 4.7 MMOL/L (ref 3.5–5.3)
PTH-INTACT SERPL-MCNC: 101.1 PG/ML (ref 12–88)
SODIUM SERPL-SCNC: 141 MMOL/L (ref 135–147)

## 2024-08-26 PROCEDURE — 83970 ASSAY OF PARATHORMONE: CPT

## 2024-08-26 PROCEDURE — 36415 COLL VENOUS BLD VENIPUNCTURE: CPT

## 2024-08-26 PROCEDURE — 82306 VITAMIN D 25 HYDROXY: CPT

## 2024-08-26 PROCEDURE — 80069 RENAL FUNCTION PANEL: CPT

## 2024-09-04 ENCOUNTER — OFFICE VISIT (OUTPATIENT)
Dept: NEPHROLOGY | Facility: CLINIC | Age: 89
End: 2024-09-04
Payer: COMMERCIAL

## 2024-09-04 VITALS
BODY MASS INDEX: 20.47 KG/M2 | WEIGHT: 143 LBS | OXYGEN SATURATION: 99 % | DIASTOLIC BLOOD PRESSURE: 60 MMHG | HEIGHT: 70 IN | SYSTOLIC BLOOD PRESSURE: 126 MMHG | HEART RATE: 49 BPM

## 2024-09-04 DIAGNOSIS — N18.32 STAGE 3B CHRONIC KIDNEY DISEASE (HCC): Primary | ICD-10-CM

## 2024-09-04 DIAGNOSIS — Z85.828 HISTORY OF MALIGNANT NEOPLASM OF SKIN: ICD-10-CM

## 2024-09-04 DIAGNOSIS — E11.22 TYPE 2 DIABETES MELLITUS WITH STAGE 3B CHRONIC KIDNEY DISEASE, WITHOUT LONG-TERM CURRENT USE OF INSULIN (HCC): ICD-10-CM

## 2024-09-04 DIAGNOSIS — E55.9 VITAMIN D DEFICIENCY: ICD-10-CM

## 2024-09-04 DIAGNOSIS — I12.9 BENIGN HYPERTENSION WITH CKD (CHRONIC KIDNEY DISEASE) STAGE III (HCC): ICD-10-CM

## 2024-09-04 DIAGNOSIS — N18.30 BENIGN HYPERTENSION WITH CKD (CHRONIC KIDNEY DISEASE) STAGE III (HCC): ICD-10-CM

## 2024-09-04 DIAGNOSIS — E78.00 PURE HYPERCHOLESTEROLEMIA: ICD-10-CM

## 2024-09-04 DIAGNOSIS — N18.32 TYPE 2 DIABETES MELLITUS WITH STAGE 3B CHRONIC KIDNEY DISEASE, WITHOUT LONG-TERM CURRENT USE OF INSULIN (HCC): ICD-10-CM

## 2024-09-04 DIAGNOSIS — N25.81 SECONDARY HYPERPARATHYROIDISM OF RENAL ORIGIN (HCC): ICD-10-CM

## 2024-09-04 PROCEDURE — 99214 OFFICE O/P EST MOD 30 MIN: CPT | Performed by: INTERNAL MEDICINE

## 2024-09-04 NOTE — PATIENT INSTRUCTIONS
"  - Please call me in 10 days after having your blood work done to review the results if you do not hear back from me or my office, as I may have not received the results.  - please remember to perform blood work prior to the next visit.  - Please call if the blood pressure top number is greater than 140 or less than 110 consistently.  - Please call if you are gaining more than 2lbs in 2 days for adjustment of water pills.  ~ Please AVOID the following pain medications.  LIST OF NSAIDS (NONSTEROIDAL ANTI-INFLAMMATORY DRUGS) AND GUZMÁN-2 INHIBITORS    DIFLUNISAL (DOLOBID)  IBUPROFEN (MOTRIN, ADVIL)  FLURBIPROFEN (ANSAID)  KETOPROFEN (ORUDIS, ORUVAIL)  FENOPROFEN (NALFON)  NABUMETONE (RELAFEN)  PIROXICAM (FELDENE)  NAPROXEN (ALEVE, NAPROSYN, NAPRELAN, ANAPROX)  DICLOFENAC (VOLTAREN, CATAFLAM)  INDOMETHACIN (INDOCIN)  SULINDAC (CLINORIL)  TOLMETIN (TOLETIN)  ETODOLAC (LODINE)  MELOXICAM (MOBIC)  KETOROLAC (TORADOL)  OXAPROZIN (DAYPRO)  CELECOXIB (CELEBREX)    Phosphorus diet  Follow a low phosphorus diet.    Avoid these higher phosphorus foods: Choose these lower phosphorus foods:   Milk, pudding or yogurt (from animals and from many soy varieties) Rice milk (unfortified), nondairy creamer (if it doesn't have terms in the ingredients list that contain the letters \"phos\")   Hard cheeses, ricotta or cottage cheese, fat-free cream cheese Regular and low-fat cream cheese   Ice cream or frozen yogurt Sherbet or frozen fruit pops   Soups made with higher phosphorus ingredients (milk, dried peas, beans, lentils) Soups made with lower phosphorus ingredients (broth- or water-based with other lower phosphorus ingredients)   Whole grains, including whole-grain breads, crackers, cereal, rice and pasta Refined grains, including white bread, crackers, cereals, rice and pasta   Quick breads, biscuits, cornbread, muffins, pancakes or waffles Homemade refined (white) dinner rolls, bagels or English muffins   Dried peas (split, " "black-eyed), beans (black, garbanzo, lima, kidney, navy, calzada) or lentils Green peas (canned, frozen), green beans or wax beans   Organ meats, walleye, pollock or sardines Lean beef, pork, lamb, poultry or other fish   Nuts and seeds Popcorn   Peanut butter and other nut butters Jam, jelly or honey   Chocolate, including chocolate drinks Carob (chocolate-flavored) candy, hard candy or gumdrops   Liset and pepper-type sodas, flavored arellano, bottled teas (if a term in the ingredients list contains the letters \"phos\") Lemon-lime soda, ginger ale or root beer, plain water   Things to do to reduce your blood pressure include working with all your physician to do the following:  ~ stop smoking if you smoke.  ~ increase cardiovascular exercise like walking and swimming.   ~ modify your diet to decrease fat and salt intake.  ~ reduce your weight if you are overweight or obese.  ~ increase the consumption of fruits, vegetables and whole grains.  ~ decrease alcohol consumption if you consume alcohol.   ~ try to minimize stress in your life with lifestyle modifications.   ~ be compliant with your anti-hypertensive medications.   ~ adjust your medications to help improve your vascular stiffness and decrease risks for heart attacks and strokes. Follow a moderate potassium diet.        "

## 2024-09-04 NOTE — PROGRESS NOTES
Nephrology Follow up Consultation  Dayday Buckley 89 y.o. male MRN: [id number]            BACKGROUND:  Dayday Buckley is a 89 y.o.male who was referred by Desirae Alarcon MD for evaluation of Follow-up and Chronic Kidney Disease  .      ASSESSMENT / PLAN:   89 y.o.  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      CKD stage 3B:  - After review of records in [facility] as well as Care everywhere patient had a baseline creatinine of 0.9-1.1 mg/dL up until April 2021 and then since January 2022 creatinine has been around 1.3-1.6 mg/dL. Most recent labs show a Creatinine of 1.55 mg/dL on 8/26/24. Renal function remains stable at baseline.    - likely has underlying CKD secondary to age-related nephron loss plus hypertensive nephrosclerosis plus diabetic kidney disease (evidence of retinopathy) plus age-related nephron loss plus repeated IV contrast exposure plus some degree of underestimation of renal function based on current MDRD formula.  - CT abdomen with IV contrast from 09/14/2022 showing no hydronephrosis simple solitary cysts on both kidneys.  - Acid base and lytes stable,  - Clinically the patient appears to be euvolemic  - Recommend to avoid use of NSAIDs, nephrotoxins. Caution advised with regards to exposure to IV contrast dye.   - Discussed with the patient in depth his renal status, including the possible etiologies for CKD.   - Advised the patient that when his GFR is close to 20mL/min then will start discussing about RRT(renal replacement therapy) options such as renal transplant, peritoneal dialysis and hemodialysis.   -  Informed the patient about the various options for Renal Replacement therapy.  - Discussed with the patient how we need to work together to delay the progression of CKD with optimal BP control based on their age and co-morbidities, optimal BS control with HbA1c of <7% and trying to reduce proteinuria by the use of anti-proteinuric agents.   -Referral to CKD education/kidney smart placed on 2/14/2023, has attended and found it very helpful    Hypertension:  BP Readings from Last 3 Encounters:   09/04/24 126/60   07/19/24 143/65   06/10/24 128/74     - Patient is on losartan 25 mg p.o. q.day, Norvasc 5 mg p.o.QHS  -Blood pressure stable at the visit no medication changes today  - Goal BP of <  130/80 based on age and comorbidities  - Instructed to follow low sodium (2gm)diet.  - Advised to hold ACEI/ARBs if patient suffers from dehydration due to gastrointestinal losses due to risk of AMOL secondary to failure to autoregulate.    Hemoglobin/anemia:  - Goal Hb of 10-12 g/dL  - Most recent labs suggestive of 11 grams/deciliter.   - on B12 shots, ferrous sulfate, no changes for now  -Continue p.o. iron for now    CKD/MBD(Mineral Bone Disease)/vitamin-D deficiency/secondary hyperparathyroidism of renal origin:  - Based on patients CKD stage following is the goal of therapy.  - Maintain calcium phosphorus product of < 55.  - Stage 3 CKD - Goal Ca 8.5-10 mg/dL , goal Phos 2.7-4.6 mg/dL  , goal iPTH 30-70 pg/mL  - Continue patient on vitamin-D 1000 units p.o. q.day and continue Calcitrol 0.25 mcg 5 times a week.   -Most recent vitamin D level 42 and intact .1 as of 8/26/2024 slowly improving  - check intact PTH vitamin-D prior to next visit    Proteinuria:  - proteinuria most likely secondary to diabetic kidney disease.    - most recent UA from 10/3/2022 no blood trace protein  -Recent protein creatinine ratio 190 mg as of January 2023 stable  -Most recent micro and creatinine ratio 7 mg/dL as of 5/21/2024  - check  protein creatinine ratio  - currently on therapy for proteinuria with vitamin-D, Zocor, losartan    Lipids:  - on Zocor  - goal LDL less than 70  - Management as per PCP    DM:  - management as per Primary team  - on metformin  - most recent A1c 6.3% as of 3/20/2024 stable  - advised patient when and if renal parameters continue to deteriorate he may need to be taken off of metformin  - current dosage of 500 mg p.o. q.day from metformin is okay for his renal function  - positive evidence of mild diabetic retinopathy as of eye exam from 05/06/2021    Adenocarcinoma distal 3rd of esophagus:  - Management as per primary team  - follow-up with Oncology, 4/26/2024 notes reviewed  -Last seen GI 6/10/2024 notes reviewed  - follow-up with thoracic surgery at [facility].  - on Prilosec  - discuss implications of long-term PPI usage for CKD in patients with the patient  - needs yearly surveillance scans recommend if possible to avoid IV contrast usage or if it is totally necessary than to hydrate patient and make sure appropriate medications are on hold to avoid nephrotoxicity    BPH:  - management primary team  - on Proscar and Flomax  - follow-up with Urology    Nutrition:  - Encouraged patient to follow a renal diet comprising of moderate potassium, low phosphorus and protein restriction to 0.8gm/kg.  - Will check serum albumin with next blood work.     Followup:  - Patient is to follow-up in 6 months, with lab work to be performed in a few days prior to the next visit.  Advised patient to call me in 10 days to review the results if they do not hear back from me, as I may have not received the results.    Tess Rosenberg MD, FASN, 9/4/2024, 12:42 PM             SUBJECTIVE: 89 y.o. male presents to the office for routine follow-up.  Presents with spouse feels well has no complaints no recent hospitalization no issues with edema no planned upcoming surgeries.  Thankful for the care information that is gone today happy to hear  renal parameters stable agrees with current plan.  [facility] living and doing well.    Review of Systems   Constitutional:  Negative for chills and fever.   HENT:  Negative for congestion.    Respiratory:  Negative for cough and wheezing.    Cardiovascular:  Negative for leg swelling.   Gastrointestinal:  Negative for constipation.   Genitourinary:  Negative for dysuria and hematuria.   Musculoskeletal:  Negative for back pain.   Neurological:  Negative for dizziness and headaches.   Psychiatric/Behavioral:  Negative for agitation and confusion.    All other systems reviewed and are negative.      PAST MEDICAL HISTORY:  Past Medical History:   Diagnosis Date   • AMOL (acute kidney injury) (HCC) 09/27/2022   • Anemia    • Castellanos's esophagus without dysplasia 2006   • BPH with obstruction/lower urinary tract symptoms    • Cancer (HCC) 2019   • Chronic GERD 12/27/2017   • Chronic pain disorder     lumbo sacral, pt had surg and pain is improved   • Colon polyp 2006   • Cough    • Diabetes mellitus (HCC)    • Diverticulosis    • Elevated PSA    • Erectile dysfunction    • Esophageal adenocarcinoma (HCC) 12/2019   • Frequency of micturition    • GERD (gastroesophageal reflux disease)    • Hiatal hernia    • History of Helicobacter pylori infection 2006   • Hyperlipidemia    • Hypertension    • Paroxysmal atrial fibrillation (HCC) 06/10/2020    Last Assessment & Plan:  - Afib RVR post op day #6 at [facility] s/p thoracoscopy/lympadenectomy rate rhythm control with diltiazem and amiodarone. No AC started.  - Amio washout completed, 2 week Zio patch monitor to r/o afib recommended, this was explained in detail with pt and his wife.   - Educated on the S/S of atrial fibrillation, TIA, CVA and he is instructed to call for any heart racing or    • Primary adenocarcinoma of distal third of esophagus (HCC) 12/09/2019   • Tobacco use disorder 12/22/2010    Last Assessment & Plan:  Pt has been smoking less than 3 pp week  not interested in quitting    • Weight loss 06/10/2020    Last Assessment & Plan:  - Unplanned weight loss accompanied by poor appetite and poor po intake since surgery - He has visit with surgeon tomorrow- advised to discuss his symptoms/weight loss - Recommended close follow up with PCP as well.       PROBLEM LIST    Patient Active Problem List   Diagnosis   • GERD (gastroesophageal reflux disease)   • BPH with urinary obstruction   • Erectile dysfunction   • Hyperlipidemia   • Frequency of urination   • Lumbar radiculopathy, chronic   • Iron deficiency anemia secondary to inadequate dietary iron intake   • RBBB   • Vitamin B12 deficiency   • Benign hypertension with CKD (chronic kidney disease) stage III (HCC)   • Secondary hyperparathyroidism of renal origin (HCC)   • Vitamin D deficiency   • History of malignant neoplasm of skin   • Type 2 diabetes mellitus with chronic kidney disease, without long-term current use of insulin (HCC)   • Chronic obstructive pulmonary disease (HCC)   • Personal history of esophageal cancer       PAST SURGICAL HISTORY:  Past Surgical History:   Procedure Laterality Date   • APPENDECTOMY     • BACK SURGERY     • CATARACT EXTRACTION Bilateral    • COLONOSCOPY  06/2011    diverticulosis   • COLONOSCOPY  11/2015    diverticulosis   • EGD  12/2019    distal esophageal adenocarcinoma   • EGD  05/2021    status post esophagus resection, esophagogastric anastomosis appears normal.  Dilated to 48 [place]   • EGD  07/2023    Previous esophagectomy; Stomach, biopsy: Gastric antral and transitional mucosa with active chronic gastritis. Negative for intestinal metaplasia and dysplasia. Immunohistochemical stain for H pylori is negative (. B. Esophagogastric junction, biopsy: Gastric oxyntic mucosa with chronic inactive gastritis. Negative for metaplasia and dysplasia. Immunostain for H pylori is negative.  Dr Mcguire   • ESOPHAGECTOMY  05/2020    at [facility] following chemoradiation for T3N0M0  "adenocarcinoma   • LUMBAR LAMINECTOMY     • MASTOID SURGERY     • NASAL SEPTUM SURGERY     • VASECTOMY         SOCIAL HISTORY :   reports that he quit smoking about 4 years ago. His smoking use included cigarettes. He started smoking about 67 years ago. He has a 62.7 pack-year smoking history. He has never used smokeless tobacco. He reports current alcohol use of about 1.0 standard drink of alcohol per week. He reports that he does not use drugs.    FAMILY HISTORY:  Family History   Problem Relation Age of Onset   • Heart disease Mother    • Hypertension Mother    • Kidney disease Father    • Colon cancer Father    • Cancer Father         Colon   • Leukemia Cousin        ALLERGIES:  No Known Allergies        PHYSICAL EXAM:  Vitals:    09/04/24 1224   BP: 126/60   BP Location: Left arm   Patient Position: Sitting   Cuff Size: Adult   Pulse: (!) 49   SpO2: 99%   Weight: 64.9 kg (143 lb)   Height: 5' 10\" (1.778 m)         Body mass index is 20.52 kg/m².    Physical Exam  Vitals reviewed.   Constitutional:       General: He is not in acute distress.     Appearance: Normal appearance. He is normal weight. He is not ill-appearing, toxic-appearing or diaphoretic.   HENT:      Head: Normocephalic and atraumatic.      Mouth/Throat:      Mouth: Mucous membranes are moist.      Pharynx: No oropharyngeal exudate.   Eyes:      General: No scleral icterus.  Cardiovascular:      Rate and Rhythm: Normal rate.   Pulmonary:      Effort: No respiratory distress.      Breath sounds: Normal breath sounds. No stridor. No wheezing.   Abdominal:      Palpations: There is no mass.      Tenderness: There is no abdominal tenderness. There is no right CVA tenderness or left CVA tenderness.   Musculoskeletal:         General: No swelling.      Cervical back: Normal range of motion. No rigidity.   Skin:     Coloration: Skin is not jaundiced.   Neurological:      General: No focal deficit present.      Mental Status: He is alert and oriented to " "person, place, and time.   Psychiatric:         Mood and Affect: Mood normal.         Behavior: Behavior normal.         LABORATORY DATA:     Results from last 6 Months   Lab Units 08/26/24  0852 05/21/24  0828   POTASSIUM mmol/L 4.7 5.0   CHLORIDE mmol/L 106 105   CO2 mmol/L 26 30   BUN mg/dL 25 23   CREATININE mg/dL 1.55* 1.47*   CALCIUM mg/dL 9.1 9.2   MAGNESIUM mg/dL  --  2.1   PHOSPHORUS mg/dL 3.4 3.5          rest all reviewed    RADIOLOGY:  No orders to display     Rest all reviewed        MEDICATIONS:    Current Outpatient Medications:   •  amLODIPine (NORVASC) 5 mg tablet, Take 1 tablet (5 mg total) by mouth daily, Disp: 90 tablet, Rfl: 3  •  calcitriol (ROCALTROL) 0.25 mcg capsule, Take one tablet five days a week Do not start before December 6, 2023., Disp: 45 capsule, Rfl: 4  •  Cholecalciferol 25 MCG (1000 UT) capsule, Take 1,000 Units by mouth daily, Disp: , Rfl:   •  Ferrous Sulfate (FEOSOL PO), Take 1 tablet by mouth daily, Disp: , Rfl:   •  finasteride (PROSCAR) 5 mg tablet, TAKE 1 TABLET BY MOUTH EVERY DAY, Disp: 90 tablet, Rfl: 3  •  losartan (COZAAR) 25 mg tablet, TAKE 1 TABLET BY MOUTH EVERY DAY, Disp: 30 tablet, Rfl: 5  •  metFORMIN (GLUCOPHAGE) 500 mg tablet, TAKE 1 TABLET BY MOUTH EVERY DAY WITH BREAKFAST, Disp: 100 tablet, Rfl: 1  •  omeprazole (PriLOSEC) 40 MG capsule, Take 1 capsule (40 mg total) by mouth 2 (two) times a day, Disp: 180 capsule, Rfl: 3  •  simvastatin (ZOCOR) 10 mg tablet, TAKE 1 TABLET BY MOUTH 4 TIMES WEEKLY, Disp: 48 tablet, Rfl: 3  •  tamsulosin (FLOMAX) 0.4 mg, TAKE 1 CAPSULE BY MOUTH EVERY DAY WITH DINNER, Disp: 90 capsule, Rfl: 1    Current Facility-Administered Medications:   •  cyanocobalamin injection 1,000 mcg, 1,000 mcg, Intramuscular, Q30 Days, Desirae Alarcon MD, 1,000 mcg at 06/20/24 1346          Portions of the record may have been created with voice recognition software. Occasional wrong word or \"sound a like\" substitutions may have occurred due to the inherent " limitations of voice recognition software. Read the chart carefully and recognize, using context, where substitutions have occurred.If you have any questions, please contact the dictating provider.

## 2024-09-10 NOTE — ASSESSMENT & PLAN NOTE
Recent CT scan negative for recurrence.    Orders:    CBC and differential; Future    Comprehensive metabolic panel; Future    T4; Future    TSH, 3rd generation; Future

## 2024-09-10 NOTE — ASSESSMENT & PLAN NOTE
Continue losartan and amlodipine.  Lab Results   Component Value Date    EGFR 39 08/26/2024    EGFR 42 05/21/2024    EGFR 39 11/29/2023    CREATININE 1.55 (H) 08/26/2024    CREATININE 1.47 (H) 05/21/2024    CREATININE 1.57 (H) 11/29/2023   Follow-up with nephrology    Orders:    CBC and differential; Future    Comprehensive metabolic panel; Future    T4; Future    TSH, 3rd generation; Future

## 2024-09-10 NOTE — ASSESSMENT & PLAN NOTE
Symptoms are controlled on Prilosec 40 mg twice daily.    Orders:    CBC and differential; Future    Comprehensive metabolic panel; Future    T4; Future    TSH, 3rd generation; Future

## 2024-09-10 NOTE — ASSESSMENT & PLAN NOTE
Sugars are well-controlled.  Continue on metformin.    Lab Results   Component Value Date    HGBA1C 6.3 03/20/2024       Orders:    CBC and differential; Future    Comprehensive metabolic panel; Future    T4; Future    TSH, 3rd generation; Future

## 2024-09-10 NOTE — ASSESSMENT & PLAN NOTE
Stable on simvastatin.    Orders:    CBC and differential; Future    Comprehensive metabolic panel; Future    Lipid panel; Future    T4; Future    TSH, 3rd generation; Future

## 2024-09-11 ENCOUNTER — TELEPHONE (OUTPATIENT)
Dept: ADMINISTRATIVE | Facility: OTHER | Age: 89
End: 2024-09-11

## 2024-09-11 ENCOUNTER — OFFICE VISIT (OUTPATIENT)
Dept: FAMILY MEDICINE CLINIC | Facility: CLINIC | Age: 89
End: 2024-09-11
Payer: COMMERCIAL

## 2024-09-11 VITALS
SYSTOLIC BLOOD PRESSURE: 118 MMHG | HEART RATE: 94 BPM | BODY MASS INDEX: 20.59 KG/M2 | DIASTOLIC BLOOD PRESSURE: 60 MMHG | OXYGEN SATURATION: 99 % | WEIGHT: 143.8 LBS | HEIGHT: 70 IN

## 2024-09-11 DIAGNOSIS — E55.9 VITAMIN D DEFICIENCY: ICD-10-CM

## 2024-09-11 DIAGNOSIS — E78.00 PURE HYPERCHOLESTEROLEMIA: ICD-10-CM

## 2024-09-11 DIAGNOSIS — I10 ESSENTIAL HYPERTENSION: ICD-10-CM

## 2024-09-11 DIAGNOSIS — E53.8 VITAMIN B12 DEFICIENCY: ICD-10-CM

## 2024-09-11 DIAGNOSIS — N18.32 STAGE 3B CHRONIC KIDNEY DISEASE (HCC): ICD-10-CM

## 2024-09-11 DIAGNOSIS — K21.9 GASTROESOPHAGEAL REFLUX DISEASE WITHOUT ESOPHAGITIS: ICD-10-CM

## 2024-09-11 DIAGNOSIS — Z85.01 PERSONAL HISTORY OF ESOPHAGEAL CANCER: ICD-10-CM

## 2024-09-11 DIAGNOSIS — I12.9 BENIGN HYPERTENSION WITH CKD (CHRONIC KIDNEY DISEASE) STAGE III (HCC): ICD-10-CM

## 2024-09-11 DIAGNOSIS — N25.81 SECONDARY HYPERPARATHYROIDISM OF RENAL ORIGIN (HCC): ICD-10-CM

## 2024-09-11 DIAGNOSIS — E11.22 TYPE 2 DIABETES MELLITUS WITH STAGE 3B CHRONIC KIDNEY DISEASE, WITHOUT LONG-TERM CURRENT USE OF INSULIN (HCC): Primary | ICD-10-CM

## 2024-09-11 DIAGNOSIS — N18.30 BENIGN HYPERTENSION WITH CKD (CHRONIC KIDNEY DISEASE) STAGE III (HCC): ICD-10-CM

## 2024-09-11 DIAGNOSIS — N18.32 TYPE 2 DIABETES MELLITUS WITH STAGE 3B CHRONIC KIDNEY DISEASE, WITHOUT LONG-TERM CURRENT USE OF INSULIN (HCC): Primary | ICD-10-CM

## 2024-09-11 PROBLEM — J44.9 CHRONIC OBSTRUCTIVE PULMONARY DISEASE (HCC): Status: RESOLVED | Noted: 2024-07-19 | Resolved: 2024-09-11

## 2024-09-11 LAB — SL AMB POCT HEMOGLOBIN AIC: 6.3 (ref ?–6.5)

## 2024-09-11 PROCEDURE — 83036 HEMOGLOBIN GLYCOSYLATED A1C: CPT | Performed by: FAMILY MEDICINE

## 2024-09-11 PROCEDURE — 96372 THER/PROPH/DIAG INJ SC/IM: CPT | Performed by: FAMILY MEDICINE

## 2024-09-11 PROCEDURE — 99214 OFFICE O/P EST MOD 30 MIN: CPT | Performed by: FAMILY MEDICINE

## 2024-09-11 RX ORDER — LOSARTAN POTASSIUM 25 MG/1
25 TABLET ORAL DAILY
Qty: 30 TABLET | Refills: 5 | Status: SHIPPED | OUTPATIENT
Start: 2024-09-11

## 2024-09-11 RX ORDER — CYANOCOBALAMIN 1000 UG/ML
1000 INJECTION, SOLUTION INTRAMUSCULAR; SUBCUTANEOUS
Status: DISCONTINUED | OUTPATIENT
Start: 2024-09-11 | End: 2024-09-12

## 2024-09-11 RX ADMIN — CYANOCOBALAMIN 1000 MCG: 1000 INJECTION, SOLUTION INTRAMUSCULAR; SUBCUTANEOUS at 10:01

## 2024-09-11 NOTE — TELEPHONE ENCOUNTER
----- Message from Sosa BYRNE sent at 9/11/2024 10:31 AM EDT -----  09/11/24 10:31 AM    Hello, our patient Dayday Buckley has had Diabetic Eye Exam completed/performed. Please assist in updating the patient chart by making an External outreach to Dr. Solomon facility located in [place]. The date of service is within last 6 months.    Thank you,  Sosa Fox MA  [facility]

## 2024-09-11 NOTE — PROGRESS NOTES
Ambulatory Visit  Name: Dayday Buckley      : 1935      MRN: [id number]  Encounter Provider: Desirae Alarcon MD  Encounter Date: 2024   Encounter department: [facility] PRIMARY CARE    Mr. Buckley is a pleasant and youthful 89-year-old gentleman who presents today to follow-up on chronic medical conditions.  Assessment & Plan  Type 2 diabetes mellitus with stage 3b chronic kidney disease, without long-term current use of insulin (HCC)  Sugars are well-controlled.  Continue on metformin.    Lab Results   Component Value Date    HGBA1C 6.3 2024       Orders:    CBC and differential; Future    Comprehensive metabolic panel; Future    T4; Future    TSH, 3rd generation; Future    Pure hypercholesterolemia  Stable on simvastatin.    Orders:    CBC and differential; Future    Comprehensive metabolic panel; Future    Lipid panel; Future    T4; Future    TSH, 3rd generation; Future    Vitamin B12 deficiency  Patient continues on IM B12 supplementation monthly.  B12 is within normal limits.     Orders:    CBC and differential; Future    Comprehensive metabolic panel; Future    T4; Future    TSH, 3rd generation; Future    Vitamin B12; Future    cyanocobalamin injection 1,000 mcg    Secondary hyperparathyroidism of renal origin (HCC)  Continue follow-up with nephrology.  Orders:    CBC and differential; Future    Comprehensive metabolic panel; Future    T4; Future    TSH, 3rd generation; Future    Gastroesophageal reflux disease without esophagitis  Symptoms are controlled on Prilosec 40 mg twice daily.    Orders:    CBC and differential; Future    Comprehensive metabolic panel; Future    T4; Future    TSH, 3rd generation; Future    Benign hypertension with CKD (chronic kidney disease) stage III (HCC)  Continue losartan and amlodipine.  Lab Results   Component Value Date    EGFR 39 2024    EGFR 42 2024    EGFR 39 2023    CREATININE 1.55 (H) 2024    CREATININE 1.47 (H) 2024     CREATININE 1.57 (H) 11/29/2023   Follow-up with nephrology    Orders:    CBC and differential; Future    Comprehensive metabolic panel; Future    T4; Future    TSH, 3rd generation; Future    Personal history of esophageal cancer  Recent CT scan negative for recurrence.    Orders:    CBC and differential; Future    Comprehensive metabolic panel; Future    T4; Future    TSH, 3rd generation; Future    Vitamin D deficiency    Orders:    Vitamin D 25 hydroxy; Future    Essential hypertension    Orders:    CBC and differential; Future    Comprehensive metabolic panel; Future    T4; Future    TSH, 3rd generation; Future    losartan (COZAAR) 25 mg tablet; Take 1 tablet (25 mg total) by mouth daily    Stage 3b chronic kidney disease (HCC)  Lab Results   Component Value Date    EGFR 39 08/26/2024    EGFR 42 05/21/2024    EGFR 39 11/29/2023    CREATININE 1.55 (H) 08/26/2024    CREATININE 1.47 (H) 05/21/2024    CREATININE 1.57 (H) 11/29/2023       Orders:    CBC and differential; Future    Comprehensive metabolic panel; Future    T4; Future    TSH, 3rd generation; Future    losartan (COZAAR) 25 mg tablet; Take 1 tablet (25 mg total) by mouth daily      Annual labs ordered.  F/U 4 months.     History of Present Illness   Chief Complaint   Patient presents with    Follow-up         Mr. Buckley is a pleasant and youthful 89-year-old gentleman who presents today to follow-up on chronic medical conditions.  He offers no acute complaints today.            Review of Systems   Constitutional:         See HPI   HENT:  Negative for congestion, ear pain, mouth sores, sinus pressure and trouble swallowing.    Eyes:  Negative for discharge, redness and itching.   Respiratory:  Negative for apnea, cough, chest tightness, shortness of breath, wheezing and stridor.    Cardiovascular:  Negative for chest pain, palpitations and leg swelling.   Gastrointestinal:  Negative for abdominal distention, abdominal pain, blood in stool, constipation,  "diarrhea, nausea and vomiting.   Endocrine: Negative for cold intolerance and heat intolerance.   Genitourinary:  Negative for difficulty urinating, dysuria, flank pain and urgency.   Musculoskeletal:  Negative for arthralgias and myalgias.   Skin:  Negative for rash.   Neurological:  Negative for dizziness, seizures, syncope, speech difficulty, weakness, light-headedness, numbness and headaches.   Hematological:  Negative for adenopathy.   Psychiatric/Behavioral:  Negative for agitation, behavioral problems, confusion and sleep disturbance. The patient is not nervous/anxious.            Objective     /60 (BP Location: Right arm, Patient Position: Sitting, Cuff Size: Standard)   Pulse 94   Ht 5' 10\" (1.778 m)   Wt 65.2 kg (143 lb 12.8 oz)   SpO2 99%   BMI 20.63 kg/m²     Physical Exam  Constitutional:       General: He is not in acute distress.     Appearance: He is well-developed. He is not ill-appearing, toxic-appearing or diaphoretic.   HENT:      Head: Normocephalic.      Left Ear: External ear normal.   Eyes:      General: No scleral icterus.  Neck:      Vascular: No carotid bruit.   Cardiovascular:      Rate and Rhythm: Normal rate and regular rhythm.      Heart sounds: Normal heart sounds. No murmur heard.     No friction rub. No gallop.   Pulmonary:      Effort: Pulmonary effort is normal. No respiratory distress.      Breath sounds: Normal breath sounds. No stridor. No wheezing, rhonchi or rales.   Chest:      Chest wall: No tenderness.   Abdominal:      General: Bowel sounds are normal. There is no distension.      Palpations: Abdomen is soft. There is no mass.      Tenderness: There is no abdominal tenderness. There is no guarding or rebound.      Hernia: No hernia is present.   Musculoskeletal:         General: No tenderness. Normal range of motion.      Cervical back: Normal range of motion and neck supple. No rigidity or tenderness.   Lymphadenopathy:      Cervical: No cervical adenopathy. "   Skin:     Coloration: Skin is not jaundiced.   Neurological:      Mental Status: He is alert and oriented to person, place, and time.   Psychiatric:         Mood and Affect: Mood normal.         Behavior: Behavior normal.         Thought Content: Thought content normal.         Judgment: Judgment normal.

## 2024-09-11 NOTE — LETTER
Diabetic Eye Exam Form    Date Requested: 24  Patient: Dayday Buckley  Patient : 1935   Referring Provider: Desirae Alarcon MD      DIABETIC Eye Exam Date _______________________________      Type of Exam MUST be documented for Diabetic Eye Exams. Please CHECK ONE.     Retinal Exam       Dilated Retinal Exam       OCT       Optomap-Iris Exam      Fundus Photography       Left Eye - Please check Retinopathy or No Retinopathy        Exam did show retinopathy    Exam did not show retinopathy       Right Eye - Please check Retinopathy or No Retinopathy       Exam did show retinopathy    Exam did not show retinopathy       Comments ____please fill out this form ______________________________________________________    Practice Providing Exam ______________________________________________    Exam Performed By (print name) _______________________________________      Provider Signature ___________________________________________________      These reports are needed for  compliance.  Please fax this completed form and a copy of the Diabetic Eye Exam report to our office located at [address] [place] [address] as soon as possible via Fax [phone] attention Kady: Phone [phone]  We thank you for your assistance in treating our mutual patient.

## 2024-09-11 NOTE — ASSESSMENT & PLAN NOTE
Patient continues on IM B12 supplementation monthly.  B12 is within normal limits.     Orders:    CBC and differential; Future    Comprehensive metabolic panel; Future    T4; Future    TSH, 3rd generation; Future    Vitamin B12; Future    cyanocobalamin injection 1,000 mcg

## 2024-09-11 NOTE — LETTER
Diabetic Eye Exam Form    Date Requested: 24  Patient: Dayday Buckley  Patient : 1935   Referring Provider: Desirae Alarcon MD      DIABETIC Eye Exam Date _______________________________      Type of Exam MUST be documented for Diabetic Eye Exams. Please CHECK ONE.     Retinal Exam       Dilated Retinal Exam       OCT       Optomap-Iris Exam      Fundus Photography       Left Eye - Please check Retinopathy or No Retinopathy        Exam did show retinopathy    Exam did not show retinopathy       Right Eye - Please check Retinopathy or No Retinopathy       Exam did show retinopathy    Exam did not show retinopathy       Comments __last we have is  Dr Listhaus _please fill out form _______________________________________________________    Practice Providing Exam ______________________________________________    Exam Performed By (print name) _______________________________________      Provider Signature ___________________________________________________      These reports are needed for  compliance.  Please fax this completed form and a copy of the Diabetic Eye Exam report to our office located at [address] [place] [address] as soon as possible via Fax [phone] attention Kady: Phone [phone]  We thank you for your assistance in treating our mutual patient.

## 2024-09-11 NOTE — LETTER
Diabetic Eye Exam Form    Date Requested: 24  Patient: Dayday Buckley  Patient : 1935   Referring Provider: Desirae Alarcon MD      DIABETIC Eye Exam Date _______________________________      Type of Exam MUST be documented for Diabetic Eye Exams. Please CHECK ONE.     Retinal Exam       Dilated Retinal Exam       OCT       Optomap-Iris Exam      Fundus Photography       Left Eye - Please check Retinopathy or No Retinopathy        Exam did show retinopathy    Exam did not show retinopathy       Right Eye - Please check Retinopathy or No Retinopathy       Exam did show retinopathy    Exam did not show retinopathy       Comments __Last was  ________________________________________________________    Practice Providing Exam ______________________________________________    Exam Performed By (print name) _______________________________________      Provider Signature ___________________________________________________      These reports are needed for  compliance.  Please fax this completed form and a copy of the Diabetic Eye Exam report to our office located at [address] [place] [address] as soon as possible via Fax [phone] attention Kady: Phone [phone]  We thank you for your assistance in treating our mutual patient.

## 2024-09-11 NOTE — ASSESSMENT & PLAN NOTE
Continue follow-up with nephrology.  Orders:    CBC and differential; Future    Comprehensive metabolic panel; Future    T4; Future    TSH, 3rd generation; Future

## 2024-09-12 NOTE — TELEPHONE ENCOUNTER
Upon review of the In Basket request and the patient's chart, initial outreach has been made via fax to facility. Please see Contacts section for details.     X1 eye     Thank you  Kady Gusman

## 2024-09-19 NOTE — TELEPHONE ENCOUNTER
As a follow-up, a second attempt has been made for outreach via fax to facility. Please see Contacts section for details.    X2 eye    Thank you  Kady Gusman

## 2024-09-26 NOTE — TELEPHONE ENCOUNTER
As a final attempt, a third outreach has been made via telephone call to facility. The outcome of the telephone call was a fax request form to be sent to Office/Facility. Please see Contacts section for details. This encounter will be closed and completed by end of day. Should we receive the requested information because of previous outreach attempts, the requested patient's chart will be updated appropriately.     Thank you  Kady Gusman

## 2024-09-30 DIAGNOSIS — E11.3293 TYPE 2 DIABETES MELLITUS WITH BOTH EYES AFFECTED BY MILD NONPROLIFERATIVE RETINOPATHY WITHOUT MACULAR EDEMA, WITHOUT LONG-TERM CURRENT USE OF INSULIN (HCC): ICD-10-CM

## 2024-09-30 DIAGNOSIS — C15.5 PRIMARY ADENOCARCINOMA OF DISTAL THIRD OF ESOPHAGUS (HCC): ICD-10-CM

## 2024-09-30 DIAGNOSIS — N18.32 STAGE 3B CHRONIC KIDNEY DISEASE (HCC): ICD-10-CM

## 2024-09-30 DIAGNOSIS — E55.9 VITAMIN D DEFICIENCY: ICD-10-CM

## 2024-09-30 DIAGNOSIS — I10 ESSENTIAL HYPERTENSION: ICD-10-CM

## 2024-09-30 DIAGNOSIS — N25.81 SECONDARY HYPERPARATHYROIDISM OF RENAL ORIGIN (HCC): ICD-10-CM

## 2024-09-30 DIAGNOSIS — E78.00 PURE HYPERCHOLESTEROLEMIA: ICD-10-CM

## 2024-09-30 RX ORDER — CALCITRIOL 0.25 UG/1
CAPSULE, LIQUID FILLED ORAL
Qty: 45 CAPSULE | Refills: 1 | Status: SHIPPED | OUTPATIENT
Start: 2024-09-30

## 2024-09-30 NOTE — TELEPHONE ENCOUNTER
Reason for call:   [x] Refill   [] Prior Auth  [] Other:     Office:   [] PCP/Provider -   [x] Specialty/Provider -Nephrology     Medication: calcitriol (ROCALTROL) 0.25 mcg     Dose/Frequency: Take one tablet five days a week     Quantity: 45    Pharmacy: [id number] - [place], [address]     Does the patient have enough for 3 days?   [x] Yes   [] No - Send as HP to POD

## 2024-10-02 DIAGNOSIS — E78.00 PURE HYPERCHOLESTEROLEMIA: ICD-10-CM

## 2024-10-02 RX ORDER — SIMVASTATIN 10 MG
TABLET ORAL
Qty: 48 TABLET | Refills: 3 | Status: SHIPPED | OUTPATIENT
Start: 2024-10-02

## 2024-10-02 NOTE — TELEPHONE ENCOUNTER
Upon review of the In Basket request we were able to locate, review, and update the patient chart as requested for Diabetic Eye Exam.    Any additional questions or concerns should be emailed to the Practice Liaisons via the appropriate education email address, please do not reply via In Basket.    Thank you  Kady Gusman   [facility]

## 2024-10-15 ENCOUNTER — CLINICAL SUPPORT (OUTPATIENT)
Dept: FAMILY MEDICINE CLINIC | Facility: CLINIC | Age: 89
End: 2024-10-15
Payer: COMMERCIAL

## 2024-10-15 DIAGNOSIS — E53.8 VITAMIN B12 DEFICIENCY: Primary | ICD-10-CM

## 2024-10-15 PROCEDURE — 96372 THER/PROPH/DIAG INJ SC/IM: CPT

## 2024-10-15 RX ADMIN — CYANOCOBALAMIN 1000 MCG: 1000 INJECTION, SOLUTION INTRAMUSCULAR; SUBCUTANEOUS at 09:00

## 2024-10-23 DIAGNOSIS — N40.1 BPH WITH URINARY OBSTRUCTION: ICD-10-CM

## 2024-10-23 DIAGNOSIS — N13.8 BPH WITH URINARY OBSTRUCTION: ICD-10-CM

## 2024-10-23 RX ORDER — TAMSULOSIN HYDROCHLORIDE 0.4 MG/1
CAPSULE ORAL
Qty: 90 CAPSULE | Refills: 1 | Status: SHIPPED | OUTPATIENT
Start: 2024-10-23

## 2024-11-14 ENCOUNTER — TELEPHONE (OUTPATIENT)
Dept: FAMILY MEDICINE CLINIC | Facility: CLINIC | Age: 89
End: 2024-11-14

## 2024-11-14 NOTE — TELEPHONE ENCOUNTER
Left detailed message advising patient that we need to reschedule his B12 injection due to injection being out of stock, unfortunately shipment has not arrived as of yet.

## 2024-11-18 NOTE — TELEPHONE ENCOUNTER
Pt's spouse returned call to check if B12 arrived.  I spoke to Malathi who advised it has.  Scheduled pt for NV on 11/20.    Additionally, she reports he slept on his neck wrong and is having slight pain.  He is using heat currently but afraid to take an OTC in case of interactions with other medications.  She would like someone to call her back to advise if he could take Tylenol.

## 2024-11-19 NOTE — TELEPHONE ENCOUNTER
Pt called back and stated that his neck is bothering him and he has been using a heating pad to get rid of discomfort but it still feels the same.  Pt feels sharp pain when he moves but has no lumps on the back of his neck.Pt is unable to turn his head fully and this started about 4 days ago. He also wanted to know if he is okay to take tylenol since he does take a lot of medications.

## 2024-11-19 NOTE — TELEPHONE ENCOUNTER
Clinical team to please call patient to see how he is feeling and what his symptoms are.  Thank you,  CB

## 2024-11-19 NOTE — TELEPHONE ENCOUNTER
Please notify patient that he may take Tylenol.  If sx continue, please make appt to be seen.  Thank you,  CB

## 2024-11-20 ENCOUNTER — CLINICAL SUPPORT (OUTPATIENT)
Dept: FAMILY MEDICINE CLINIC | Facility: CLINIC | Age: 89
End: 2024-11-20
Payer: COMMERCIAL

## 2024-11-20 DIAGNOSIS — E53.8 VITAMIN B12 DEFICIENCY: Primary | ICD-10-CM

## 2024-11-20 PROCEDURE — 96372 THER/PROPH/DIAG INJ SC/IM: CPT

## 2024-11-20 RX ADMIN — CYANOCOBALAMIN 1000 MCG: 1000 INJECTION, SOLUTION INTRAMUSCULAR; SUBCUTANEOUS at 14:21

## 2024-11-20 NOTE — PROGRESS NOTES
Patient presents for monthly b12 injection in right deltoid. Patient tolerated and left in no distress

## 2024-12-19 ENCOUNTER — CLINICAL SUPPORT (OUTPATIENT)
Dept: FAMILY MEDICINE CLINIC | Facility: CLINIC | Age: 89
End: 2024-12-19
Payer: COMMERCIAL

## 2024-12-19 DIAGNOSIS — E53.8 VITAMIN B12 DEFICIENCY: Primary | ICD-10-CM

## 2024-12-19 PROCEDURE — 96372 THER/PROPH/DIAG INJ SC/IM: CPT

## 2024-12-19 RX ADMIN — CYANOCOBALAMIN 1000 MCG: 1000 INJECTION, SOLUTION INTRAMUSCULAR; SUBCUTANEOUS at 13:56

## 2025-01-13 DIAGNOSIS — E11.9 TYPE 2 DIABETES MELLITUS WITHOUT COMPLICATION, WITHOUT LONG-TERM CURRENT USE OF INSULIN (HCC): ICD-10-CM

## 2025-01-20 DIAGNOSIS — E11.3293 TYPE 2 DIABETES MELLITUS WITH BOTH EYES AFFECTED BY MILD NONPROLIFERATIVE RETINOPATHY WITHOUT MACULAR EDEMA, WITHOUT LONG-TERM CURRENT USE OF INSULIN (HCC): ICD-10-CM

## 2025-01-20 DIAGNOSIS — E78.00 PURE HYPERCHOLESTEROLEMIA: ICD-10-CM

## 2025-01-20 DIAGNOSIS — N25.81 SECONDARY HYPERPARATHYROIDISM OF RENAL ORIGIN (HCC): ICD-10-CM

## 2025-01-20 DIAGNOSIS — C15.5 PRIMARY ADENOCARCINOMA OF DISTAL THIRD OF ESOPHAGUS (HCC): ICD-10-CM

## 2025-01-20 DIAGNOSIS — I10 ESSENTIAL HYPERTENSION: ICD-10-CM

## 2025-01-20 DIAGNOSIS — N18.32 STAGE 3B CHRONIC KIDNEY DISEASE (HCC): ICD-10-CM

## 2025-01-20 DIAGNOSIS — E55.9 VITAMIN D DEFICIENCY: ICD-10-CM

## 2025-01-20 NOTE — TELEPHONE ENCOUNTER
Patient is requesting 1 year supply       Reason for call:   [x] Refill   [] Prior Auth  [] Other:     Office:   [] PCP/Provider -   [x] Specialty/Provider - Neph     Medication: Amlodipine 5 mg, take 1 tablet by mouth daily       Pharmacy: [facility]     Does the patient have enough for 3 days?   [x] Yes   [] No - Send as HP to POD

## 2025-01-21 ENCOUNTER — CLINICAL SUPPORT (OUTPATIENT)
Dept: FAMILY MEDICINE CLINIC | Facility: CLINIC | Age: 89
End: 2025-01-21
Payer: COMMERCIAL

## 2025-01-21 DIAGNOSIS — E11.9 TYPE 2 DIABETES MELLITUS WITHOUT COMPLICATION, WITHOUT LONG-TERM CURRENT USE OF INSULIN (HCC): Primary | ICD-10-CM

## 2025-01-21 PROCEDURE — 96372 THER/PROPH/DIAG INJ SC/IM: CPT

## 2025-01-21 RX ORDER — AMLODIPINE BESYLATE 5 MG/1
5 TABLET ORAL DAILY
Qty: 90 TABLET | Refills: 0 | Status: SHIPPED | OUTPATIENT
Start: 2025-01-21

## 2025-01-21 RX ADMIN — CYANOCOBALAMIN 1000 MCG: 1000 INJECTION, SOLUTION INTRAMUSCULAR; SUBCUTANEOUS at 13:59

## 2025-01-25 DIAGNOSIS — I10 ESSENTIAL HYPERTENSION: ICD-10-CM

## 2025-01-25 DIAGNOSIS — N25.81 SECONDARY HYPERPARATHYROIDISM OF RENAL ORIGIN (HCC): ICD-10-CM

## 2025-01-25 DIAGNOSIS — N18.32 STAGE 3B CHRONIC KIDNEY DISEASE (HCC): ICD-10-CM

## 2025-01-25 DIAGNOSIS — C15.5 PRIMARY ADENOCARCINOMA OF DISTAL THIRD OF ESOPHAGUS (HCC): ICD-10-CM

## 2025-01-25 DIAGNOSIS — E78.00 PURE HYPERCHOLESTEROLEMIA: ICD-10-CM

## 2025-01-25 DIAGNOSIS — E55.9 VITAMIN D DEFICIENCY: ICD-10-CM

## 2025-01-25 DIAGNOSIS — E11.3293 TYPE 2 DIABETES MELLITUS WITH BOTH EYES AFFECTED BY MILD NONPROLIFERATIVE RETINOPATHY WITHOUT MACULAR EDEMA, WITHOUT LONG-TERM CURRENT USE OF INSULIN (HCC): ICD-10-CM

## 2025-01-26 DIAGNOSIS — I10 ESSENTIAL HYPERTENSION: ICD-10-CM

## 2025-01-26 DIAGNOSIS — N18.32 STAGE 3B CHRONIC KIDNEY DISEASE (HCC): ICD-10-CM

## 2025-01-27 RX ORDER — LOSARTAN POTASSIUM 25 MG/1
25 TABLET ORAL DAILY
Qty: 30 TABLET | Refills: 5 | Status: SHIPPED | OUTPATIENT
Start: 2025-01-27

## 2025-01-27 RX ORDER — CALCITRIOL 0.25 UG/1
CAPSULE, LIQUID FILLED ORAL
Qty: 45 CAPSULE | Refills: 1 | Status: SHIPPED | OUTPATIENT
Start: 2025-01-27 | End: 2025-02-05 | Stop reason: SDUPTHER

## 2025-02-04 ENCOUNTER — RESULTS FOLLOW-UP (OUTPATIENT)
Dept: NEPHROLOGY | Facility: CLINIC | Age: 89
End: 2025-02-04

## 2025-02-04 ENCOUNTER — APPOINTMENT (OUTPATIENT)
Dept: LAB | Facility: CLINIC | Age: 89
End: 2025-02-04
Payer: COMMERCIAL

## 2025-02-04 DIAGNOSIS — N25.81 SECONDARY HYPERPARATHYROIDISM OF RENAL ORIGIN (HCC): ICD-10-CM

## 2025-02-04 DIAGNOSIS — E78.00 PURE HYPERCHOLESTEROLEMIA: ICD-10-CM

## 2025-02-04 DIAGNOSIS — I12.9 BENIGN HYPERTENSION WITH CKD (CHRONIC KIDNEY DISEASE) STAGE III (HCC): ICD-10-CM

## 2025-02-04 DIAGNOSIS — E11.22 TYPE 2 DIABETES MELLITUS WITH STAGE 3B CHRONIC KIDNEY DISEASE, WITHOUT LONG-TERM CURRENT USE OF INSULIN (HCC): ICD-10-CM

## 2025-02-04 DIAGNOSIS — N18.30 BENIGN HYPERTENSION WITH CKD (CHRONIC KIDNEY DISEASE) STAGE III (HCC): ICD-10-CM

## 2025-02-04 DIAGNOSIS — N18.32 TYPE 2 DIABETES MELLITUS WITH STAGE 3B CHRONIC KIDNEY DISEASE, WITHOUT LONG-TERM CURRENT USE OF INSULIN (HCC): ICD-10-CM

## 2025-02-04 DIAGNOSIS — K21.9 GASTROESOPHAGEAL REFLUX DISEASE WITHOUT ESOPHAGITIS: ICD-10-CM

## 2025-02-04 DIAGNOSIS — N18.32 STAGE 3B CHRONIC KIDNEY DISEASE (HCC): ICD-10-CM

## 2025-02-04 DIAGNOSIS — I10 ESSENTIAL HYPERTENSION: ICD-10-CM

## 2025-02-04 DIAGNOSIS — E53.8 VITAMIN B12 DEFICIENCY: ICD-10-CM

## 2025-02-04 DIAGNOSIS — E55.9 VITAMIN D DEFICIENCY: ICD-10-CM

## 2025-02-04 DIAGNOSIS — Z85.828 HISTORY OF MALIGNANT NEOPLASM OF SKIN: ICD-10-CM

## 2025-02-04 DIAGNOSIS — Z85.01 PERSONAL HISTORY OF ESOPHAGEAL CANCER: ICD-10-CM

## 2025-02-04 LAB
25(OH)D3 SERPL-MCNC: 53.9 NG/ML (ref 30–100)
ALBUMIN SERPL BCG-MCNC: 4.3 G/DL (ref 3.5–5)
ALP SERPL-CCNC: 77 U/L (ref 34–104)
ALT SERPL W P-5'-P-CCNC: 9 U/L (ref 7–52)
ANION GAP SERPL CALCULATED.3IONS-SCNC: 8 MMOL/L (ref 4–13)
AST SERPL W P-5'-P-CCNC: 11 U/L (ref 13–39)
BASOPHILS # BLD AUTO: 0.05 THOUSANDS/ΜL (ref 0–0.1)
BASOPHILS NFR BLD AUTO: 1 % (ref 0–1)
BILIRUB SERPL-MCNC: 0.66 MG/DL (ref 0.2–1)
BUN SERPL-MCNC: 23 MG/DL (ref 5–25)
CALCIUM SERPL-MCNC: 9.5 MG/DL (ref 8.4–10.2)
CHLORIDE SERPL-SCNC: 104 MMOL/L (ref 96–108)
CHOLEST SERPL-MCNC: 171 MG/DL (ref ?–200)
CO2 SERPL-SCNC: 29 MMOL/L (ref 21–32)
CREAT SERPL-MCNC: 1.45 MG/DL (ref 0.6–1.3)
EOSINOPHIL # BLD AUTO: 0.14 THOUSAND/ΜL (ref 0–0.61)
EOSINOPHIL NFR BLD AUTO: 2 % (ref 0–6)
ERYTHROCYTE [DISTWIDTH] IN BLOOD BY AUTOMATED COUNT: 13.9 % (ref 11.6–15.1)
GFR SERPL CREATININE-BSD FRML MDRD: 42 ML/MIN/1.73SQ M
GLUCOSE P FAST SERPL-MCNC: 112 MG/DL (ref 65–99)
HCT VFR BLD AUTO: 36.4 % (ref 36.5–49.3)
HDLC SERPL-MCNC: 87 MG/DL
HGB BLD-MCNC: 11.6 G/DL (ref 12–17)
IMM GRANULOCYTES # BLD AUTO: 0.02 THOUSAND/UL (ref 0–0.2)
IMM GRANULOCYTES NFR BLD AUTO: 0 % (ref 0–2)
LDLC SERPL CALC-MCNC: 68 MG/DL (ref 0–100)
LYMPHOCYTES # BLD AUTO: 0.77 THOUSANDS/ΜL (ref 0.6–4.47)
LYMPHOCYTES NFR BLD AUTO: 11 % (ref 14–44)
MCH RBC QN AUTO: 29.8 PG (ref 26.8–34.3)
MCHC RBC AUTO-ENTMCNC: 31.9 G/DL (ref 31.4–37.4)
MCV RBC AUTO: 94 FL (ref 82–98)
MONOCYTES # BLD AUTO: 0.7 THOUSAND/ΜL (ref 0.17–1.22)
MONOCYTES NFR BLD AUTO: 10 % (ref 4–12)
NEUTROPHILS # BLD AUTO: 5.15 THOUSANDS/ΜL (ref 1.85–7.62)
NEUTS SEG NFR BLD AUTO: 76 % (ref 43–75)
NONHDLC SERPL-MCNC: 84 MG/DL
NRBC BLD AUTO-RTO: 0 /100 WBCS
PHOSPHATE SERPL-MCNC: 3.6 MG/DL (ref 2.3–4.1)
PLATELET # BLD AUTO: 239 THOUSANDS/UL (ref 149–390)
PMV BLD AUTO: 12.1 FL (ref 8.9–12.7)
POTASSIUM SERPL-SCNC: 4.7 MMOL/L (ref 3.5–5.3)
PROT SERPL-MCNC: 6.7 G/DL (ref 6.4–8.4)
PTH-INTACT SERPL-MCNC: 123.5 PG/ML (ref 12–88)
RBC # BLD AUTO: 3.89 MILLION/UL (ref 3.88–5.62)
SODIUM SERPL-SCNC: 141 MMOL/L (ref 135–147)
T4 SERPL-MCNC: 7.31 UG/DL (ref 6.09–12.23)
TRIGL SERPL-MCNC: 81 MG/DL (ref ?–150)
TSH SERPL DL<=0.05 MIU/L-ACNC: 3.36 UIU/ML (ref 0.45–4.5)
VIT B12 SERPL-MCNC: 614 PG/ML (ref 180–914)
WBC # BLD AUTO: 6.83 THOUSAND/UL (ref 4.31–10.16)

## 2025-02-04 PROCEDURE — 84443 ASSAY THYROID STIM HORMONE: CPT

## 2025-02-04 PROCEDURE — 82607 VITAMIN B-12: CPT

## 2025-02-04 PROCEDURE — 36415 COLL VENOUS BLD VENIPUNCTURE: CPT

## 2025-02-04 PROCEDURE — 80053 COMPREHEN METABOLIC PANEL: CPT

## 2025-02-04 PROCEDURE — 85025 COMPLETE CBC W/AUTO DIFF WBC: CPT

## 2025-02-04 PROCEDURE — 84436 ASSAY OF TOTAL THYROXINE: CPT

## 2025-02-04 PROCEDURE — 80061 LIPID PANEL: CPT

## 2025-02-04 PROCEDURE — 84100 ASSAY OF PHOSPHORUS: CPT

## 2025-02-04 PROCEDURE — 83970 ASSAY OF PARATHORMONE: CPT

## 2025-02-04 PROCEDURE — 82306 VITAMIN D 25 HYDROXY: CPT

## 2025-02-05 ENCOUNTER — RESULTS FOLLOW-UP (OUTPATIENT)
Dept: NEPHROLOGY | Facility: CLINIC | Age: 89
End: 2025-02-05

## 2025-02-05 DIAGNOSIS — N25.81 SECONDARY HYPERPARATHYROIDISM OF RENAL ORIGIN (HCC): ICD-10-CM

## 2025-02-05 DIAGNOSIS — C15.5 PRIMARY ADENOCARCINOMA OF DISTAL THIRD OF ESOPHAGUS (HCC): ICD-10-CM

## 2025-02-05 DIAGNOSIS — E55.9 VITAMIN D DEFICIENCY: ICD-10-CM

## 2025-02-05 DIAGNOSIS — N18.32 STAGE 3B CHRONIC KIDNEY DISEASE (HCC): ICD-10-CM

## 2025-02-05 DIAGNOSIS — I10 ESSENTIAL HYPERTENSION: ICD-10-CM

## 2025-02-05 DIAGNOSIS — E11.3293 TYPE 2 DIABETES MELLITUS WITH BOTH EYES AFFECTED BY MILD NONPROLIFERATIVE RETINOPATHY WITHOUT MACULAR EDEMA, WITHOUT LONG-TERM CURRENT USE OF INSULIN (HCC): ICD-10-CM

## 2025-02-05 DIAGNOSIS — E78.00 PURE HYPERCHOLESTEROLEMIA: ICD-10-CM

## 2025-02-05 RX ORDER — CALCITRIOL 0.25 UG/1
0.25 CAPSULE, LIQUID FILLED ORAL DAILY
Qty: 90 CAPSULE | Refills: 3 | Status: SHIPPED | OUTPATIENT
Start: 2025-02-05

## 2025-02-10 DIAGNOSIS — N25.81 SECONDARY HYPERPARATHYROIDISM OF RENAL ORIGIN (HCC): ICD-10-CM

## 2025-02-10 DIAGNOSIS — C15.5 PRIMARY ADENOCARCINOMA OF DISTAL THIRD OF ESOPHAGUS (HCC): ICD-10-CM

## 2025-02-10 DIAGNOSIS — E78.00 PURE HYPERCHOLESTEROLEMIA: ICD-10-CM

## 2025-02-10 DIAGNOSIS — I10 ESSENTIAL HYPERTENSION: ICD-10-CM

## 2025-02-10 DIAGNOSIS — E55.9 VITAMIN D DEFICIENCY: ICD-10-CM

## 2025-02-10 DIAGNOSIS — E11.3293 TYPE 2 DIABETES MELLITUS WITH BOTH EYES AFFECTED BY MILD NONPROLIFERATIVE RETINOPATHY WITHOUT MACULAR EDEMA, WITHOUT LONG-TERM CURRENT USE OF INSULIN (HCC): ICD-10-CM

## 2025-02-10 DIAGNOSIS — N18.32 STAGE 3B CHRONIC KIDNEY DISEASE (HCC): ICD-10-CM

## 2025-02-10 NOTE — TELEPHONE ENCOUNTER
Patient's wife called to check on Calcitriol as the dose was increased. She was informed it was sent to [facility] on 2/5/25 for 90 days with 3 refills.

## 2025-02-12 ENCOUNTER — OFFICE VISIT (OUTPATIENT)
Dept: FAMILY MEDICINE CLINIC | Facility: CLINIC | Age: 89
End: 2025-02-12
Payer: COMMERCIAL

## 2025-02-12 VITALS
DIASTOLIC BLOOD PRESSURE: 60 MMHG | HEART RATE: 60 BPM | SYSTOLIC BLOOD PRESSURE: 122 MMHG | BODY MASS INDEX: 20.67 KG/M2 | HEIGHT: 70 IN | WEIGHT: 144.4 LBS | OXYGEN SATURATION: 95 %

## 2025-02-12 DIAGNOSIS — N25.81 SECONDARY HYPERPARATHYROIDISM OF RENAL ORIGIN (HCC): ICD-10-CM

## 2025-02-12 DIAGNOSIS — N13.8 BPH WITH URINARY OBSTRUCTION: ICD-10-CM

## 2025-02-12 DIAGNOSIS — Z85.01 PERSONAL HISTORY OF ESOPHAGEAL CANCER: ICD-10-CM

## 2025-02-12 DIAGNOSIS — I12.9 BENIGN HYPERTENSION WITH CKD (CHRONIC KIDNEY DISEASE) STAGE III (HCC): ICD-10-CM

## 2025-02-12 DIAGNOSIS — N18.30 BENIGN HYPERTENSION WITH CKD (CHRONIC KIDNEY DISEASE) STAGE III (HCC): ICD-10-CM

## 2025-02-12 DIAGNOSIS — K21.9 GASTROESOPHAGEAL REFLUX DISEASE WITHOUT ESOPHAGITIS: ICD-10-CM

## 2025-02-12 DIAGNOSIS — E11.9 TYPE 2 DIABETES MELLITUS WITHOUT COMPLICATION, WITHOUT LONG-TERM CURRENT USE OF INSULIN (HCC): ICD-10-CM

## 2025-02-12 DIAGNOSIS — E11.22 TYPE 2 DIABETES MELLITUS WITH CHRONIC KIDNEY DISEASE, WITHOUT LONG-TERM CURRENT USE OF INSULIN, UNSPECIFIED CKD STAGE (HCC): Primary | ICD-10-CM

## 2025-02-12 DIAGNOSIS — I10 ESSENTIAL HYPERTENSION: ICD-10-CM

## 2025-02-12 DIAGNOSIS — N40.1 BPH WITH URINARY OBSTRUCTION: ICD-10-CM

## 2025-02-12 DIAGNOSIS — E53.8 VITAMIN B12 DEFICIENCY: ICD-10-CM

## 2025-02-12 LAB — SL AMB POCT HEMOGLOBIN AIC: 6.5 (ref ?–6.5)

## 2025-02-12 PROCEDURE — 83036 HEMOGLOBIN GLYCOSYLATED A1C: CPT | Performed by: FAMILY MEDICINE

## 2025-02-12 PROCEDURE — 99214 OFFICE O/P EST MOD 30 MIN: CPT | Performed by: FAMILY MEDICINE

## 2025-02-12 PROCEDURE — 96372 THER/PROPH/DIAG INJ SC/IM: CPT | Performed by: FAMILY MEDICINE

## 2025-02-12 RX ORDER — LOSARTAN POTASSIUM 25 MG/1
25 TABLET ORAL DAILY
Qty: 90 TABLET | Refills: 3 | Status: SHIPPED | OUTPATIENT
Start: 2025-02-12

## 2025-02-12 RX ORDER — LOSARTAN POTASSIUM 25 MG/1
25 TABLET ORAL DAILY
Qty: 30 TABLET | Refills: 5 | Status: SHIPPED | OUTPATIENT
Start: 2025-02-12 | End: 2025-02-12 | Stop reason: SDUPTHER

## 2025-02-12 RX ADMIN — CYANOCOBALAMIN 1000 MCG: 1000 INJECTION, SOLUTION INTRAMUSCULAR; SUBCUTANEOUS at 10:53

## 2025-02-12 NOTE — ASSESSMENT & PLAN NOTE
A1C 6.5.  No hypoglycemic episodes  Remains on metformin 500mg daily.  DFE today is WNL.  Lab Results   Component Value Date    HGBA1C 6.5 02/12/2025     Orders:  •  POCT hemoglobin A1c

## 2025-02-12 NOTE — PROGRESS NOTES
Name: Dayday Buckley      : 1935      MRN: [id number]  Encounter Provider: Desirae Alarcon MD  Encounter Date: 2025   Encounter department: [facility] PRIMARY CARE  :  Mr. Buckley is a pleasant and youthful 89 year old gentlemen who presents with his wife today to review chromic medical conditions and recent blood work.  He has been doing well.  Requests ear check for wax.  Notes that if he eats too much he feels uncomfortable - this is not new and present since his surgery.  Due for repeat CT scan after .  Assessment & Plan  Type 2 diabetes mellitus with chronic kidney disease, without long-term current use of insulin, unspecified CKD stage (HCC)  A1C 6.5.  No hypoglycemic episodes  Remains on metformin 500mg daily.  DFE today is WNL.  Lab Results   Component Value Date    HGBA1C 6.5 2025     Orders:  •  POCT hemoglobin A1c    Secondary hyperparathyroidism of renal origin (HCC)  Continue follow-up with nephrology.  Recent PTH is 125 - stable.  Cont. Calcitriol       Benign hypertension with CKD (chronic kidney disease) stage III (HCC)  Lab Results   Component Value Date    EGFR 42 2025    EGFR 39 2024    EGFR 42 2024    CREATININE 1.45 (H) 2025    CREATININE 1.55 (H) 2024    CREATININE 1.47 (H) 2024     Maintain BP control.  Monitor renal function  Stay well hydrated and avoid nephrotioxc agents and contrast dye as possible. He is due for repeat CT scan in the Spring - he will have ORAL bu NO IV contrast.      Gastroesophageal reflux disease without esophagitis  Symptoms are controlled on Prilosec 40 mg twice daily.  Vit B12 is normal at 614.         Personal history of esophageal cancer  Recent CT scan negative for recurrence.  Continue monitoring as per his oncology team q 6 months.  Remains on iron supplements  He has a zoom call once a year with [facility] team    Orders:  •  CT chest abdomen pelvis wo contrast; Future    BPH with urinary  "obstruction  Symptoms stable on tamsulosin and finasteride.   Dr. Crane.       Vitamin B12 deficiency  Patient continues on IM B12 supplementation monthly.  B12 is within normal limits.          Type 2 diabetes mellitus without complication, without long-term current use of insulin ([facility])    Lab Results   Component Value Date    HGBA1C 6.5 02/12/2025     Orders:  •  metFORMIN (GLUCOPHAGE) 500 mg tablet; Take 1 tablet (500 mg total) by mouth daily with breakfast    Essential hypertension    Orders:  •  losartan (COZAAR) 25 mg tablet; Take 1 tablet (25 mg total) by mouth daily      Labs printed and reviewed at length with patient today.  RTO in 4 months.     History of Present Illness   Chief Complaint   Patient presents with   • Follow-up     4 month fu- wants ears checked        Mr. Buckley is a pleasant and youthful 89 year old gentlemen who presents with his wife today to review chromic medical conditions and recent blood work.  He has been doing well.  Requests ear check for wax.  Notes that if he eats too much he feels uncomfortable - this is not new and present since his surgery.  Due for repeat CT scan after April 9.        Review of Systems   Constitutional:         See HPI   HENT:  Negative for congestion, ear pain, mouth sores, sinus pressure and trouble swallowing.         Check ears for wax   Eyes:  Negative for discharge, redness and itching.   Respiratory:  Negative for apnea, cough, chest tightness, shortness of breath, wheezing and stridor.    Cardiovascular:  Negative for chest pain, palpitations and leg swelling.   Gastrointestinal:  Negative for abdominal distention, abdominal pain, blood in stool, constipation, diarrhea, nausea and vomiting.        Notes that if he over eats he feels uncomfortable and has to lie down for 15 minutes until the \"food passes\"   Endocrine: Negative for cold intolerance and heat intolerance.   Genitourinary:  Negative for difficulty urinating, dysuria, flank pain and " "urgency.   Musculoskeletal:  Negative for arthralgias and myalgias.   Skin:  Negative for rash.   Neurological:  Negative for dizziness, seizures, syncope, speech difficulty, weakness, light-headedness, numbness and headaches.   Hematological:  Negative for adenopathy.   Psychiatric/Behavioral:  Negative for agitation, behavioral problems, confusion and sleep disturbance. The patient is not nervous/anxious.        Objective   /60 (BP Location: Right arm, Patient Position: Sitting, Cuff Size: Standard)   Pulse 60   Ht 5' 10\" (1.778 m)   Wt 65.5 kg (144 lb 6.4 oz)   SpO2 95%   BMI 20.72 kg/m²      Physical Exam  Vitals and nursing note reviewed.   Constitutional:       General: He is not in acute distress.     Appearance: Normal appearance. He is normal weight. He is not ill-appearing.      Comments: Well-groomed; excellent historian   HENT:      Ears:      Comments: Tms and canals normal bilaterally  Eyes:      General: No scleral icterus.  Neck:      Vascular: No carotid bruit.   Cardiovascular:      Rate and Rhythm: Normal rate and regular rhythm.      Pulses: no weak pulses.           Dorsalis pedis pulses are 2+ on the right side and 2+ on the left side.        Posterior tibial pulses are 2+ on the right side and 2+ on the left side.      Heart sounds: Normal heart sounds. No murmur heard.     No friction rub. No gallop.   Pulmonary:      Effort: Pulmonary effort is normal. No respiratory distress.      Breath sounds: No stridor. No wheezing, rhonchi or rales.   Abdominal:      General: Abdomen is flat. Bowel sounds are normal. There is no distension.      Palpations: Abdomen is soft. There is no mass.      Tenderness: There is no abdominal tenderness. There is no guarding or rebound.      Hernia: No hernia is present.   Musculoskeletal:      Cervical back: Normal range of motion.      Right lower leg: No edema.      Left lower leg: No edema.   Feet:      Right foot:      Skin integrity: No ulcer, skin " breakdown, erythema, warmth, callus or dry skin.      Left foot:      Skin integrity: No ulcer, skin breakdown, erythema, warmth, callus or dry skin.   Lymphadenopathy:      Cervical: No cervical adenopathy.   Skin:     General: Skin is warm.      Coloration: Skin is not jaundiced.   Neurological:      General: No focal deficit present.      Mental Status: He is alert and oriented to person, place, and time.      Gait: Gait normal.   Psychiatric:         Mood and Affect: Mood normal.         Behavior: Behavior normal.         Thought Content: Thought content normal.         Judgment: Judgment normal.           Diabetic Foot Exam    Patient's shoes and socks removed.    Right Foot/Ankle   Right Foot Inspection  Skin Exam: skin normal and skin intact. No dry skin, no warmth, no callus, no erythema, no maceration, no abnormal color, no pre-ulcer, no ulcer and no callus.     Toe Exam: ROM and strength within normal limits. No swelling, no tenderness, erythema and  no right toe deformity    Sensory   Proprioception: intact  Monofilament testing: intact    Vascular  Capillary refills: < 3 seconds  The right DP pulse is 2+. The right PT pulse is 2+.     Left Foot/Ankle  Left Foot Inspection  Skin Exam: skin normal and skin intact. No dry skin, no warmth, no erythema, no maceration, normal color, no pre-ulcer, no ulcer and no callus.     Toe Exam: ROM and strength within normal limits. No swelling, no tenderness, no erythema and no left toe deformity.     Sensory   Proprioception: intact  Monofilament testing: intact    Vascular  Capillary refills: < 3 seconds  The left DP pulse is 2+. The left PT pulse is 2+.     Assign Risk Category  No deformity present  No loss of protective sensation  No weak pulses  Risk: 0

## 2025-02-12 NOTE — ASSESSMENT & PLAN NOTE
Lab Results   Component Value Date    EGFR 42 02/04/2025    EGFR 39 08/26/2024    EGFR 42 05/21/2024    CREATININE 1.45 (H) 02/04/2025    CREATININE 1.55 (H) 08/26/2024    CREATININE 1.47 (H) 05/21/2024     Maintain BP control.  Monitor renal function  Stay well hydrated and avoid nephrotioxc agents and contrast dye as possible. He is due for repeat CT scan in the Spring - he will have ORAL bu NO IV contrast.

## 2025-03-12 ENCOUNTER — CLINICAL SUPPORT (OUTPATIENT)
Dept: FAMILY MEDICINE CLINIC | Facility: CLINIC | Age: 89
End: 2025-03-12
Payer: COMMERCIAL

## 2025-03-12 DIAGNOSIS — E53.8 VITAMIN B12 DEFICIENCY: Primary | ICD-10-CM

## 2025-03-12 PROCEDURE — 96372 THER/PROPH/DIAG INJ SC/IM: CPT

## 2025-03-12 RX ADMIN — CYANOCOBALAMIN 1000 MCG: 1000 INJECTION, SOLUTION INTRAMUSCULAR; SUBCUTANEOUS at 13:17

## 2025-03-12 NOTE — PROGRESS NOTES
Pt presents for monthly b12 injection in right deltoid. Patient tolerated and left in no distress.

## 2025-04-03 ENCOUNTER — OFFICE VISIT (OUTPATIENT)
Dept: NEPHROLOGY | Facility: CLINIC | Age: 89
End: 2025-04-03

## 2025-04-03 VITALS
HEART RATE: 52 BPM | DIASTOLIC BLOOD PRESSURE: 68 MMHG | OXYGEN SATURATION: 99 % | BODY MASS INDEX: 20.66 KG/M2 | WEIGHT: 144 LBS | SYSTOLIC BLOOD PRESSURE: 110 MMHG

## 2025-04-03 DIAGNOSIS — N18.32 TYPE 2 DIABETES MELLITUS WITH STAGE 3B CHRONIC KIDNEY DISEASE, WITHOUT LONG-TERM CURRENT USE OF INSULIN (HCC): Primary | ICD-10-CM

## 2025-04-03 DIAGNOSIS — E78.00 PURE HYPERCHOLESTEROLEMIA: ICD-10-CM

## 2025-04-03 DIAGNOSIS — N18.9 CHRONIC KIDNEY DISEASE-MINERAL AND BONE DISORDER (CKD-MBD): ICD-10-CM

## 2025-04-03 DIAGNOSIS — E11.22 TYPE 2 DIABETES MELLITUS WITH STAGE 3B CHRONIC KIDNEY DISEASE, WITHOUT LONG-TERM CURRENT USE OF INSULIN (HCC): Primary | ICD-10-CM

## 2025-04-03 DIAGNOSIS — I12.9 BENIGN HYPERTENSION WITH CKD (CHRONIC KIDNEY DISEASE) STAGE III (HCC): ICD-10-CM

## 2025-04-03 DIAGNOSIS — N18.30 BENIGN HYPERTENSION WITH CKD (CHRONIC KIDNEY DISEASE) STAGE III (HCC): ICD-10-CM

## 2025-04-03 DIAGNOSIS — M89.9 CHRONIC KIDNEY DISEASE-MINERAL AND BONE DISORDER (CKD-MBD): ICD-10-CM

## 2025-04-03 DIAGNOSIS — E83.9 CHRONIC KIDNEY DISEASE-MINERAL AND BONE DISORDER (CKD-MBD): ICD-10-CM

## 2025-04-03 DIAGNOSIS — R80.1 PERSISTENT PROTEINURIA: ICD-10-CM

## 2025-04-03 PROBLEM — E55.9 VITAMIN D DEFICIENCY: Status: RESOLVED | Noted: 2023-02-14 | Resolved: 2025-04-03

## 2025-04-03 PROBLEM — N25.81 SECONDARY HYPERPARATHYROIDISM OF RENAL ORIGIN (HCC): Status: RESOLVED | Noted: 2023-02-14 | Resolved: 2025-04-03

## 2025-04-03 NOTE — ASSESSMENT & PLAN NOTE
Goal LDL less than 70  Continue on simvastatin     Orders:  •  PTH, intact; Future  •  Renal function panel; Future  •  Vitamin D 25 hydroxy; Future  •  PTH, intact; Future  •  Renal function panel; Future  •  Vitamin D 25 hydroxy; Future  •  Albumin / creatinine urine ratio; Future  •  Magnesium; Future  •  Phosphorus; Future

## 2025-04-03 NOTE — ASSESSMENT & PLAN NOTE
Lab Results   Component Value Date    HGBA1C 6.5 02/12/2025   Most recent A1c at 6.5% on 2/12/2025  Hemoglobin A1c as high as 6.8% on 10/17/2019  Advised patient of tight glycemic control as that will help delay CKD progression  Currently on: Metformin  Discussed with patient if renal parameters continue deteriorate he will need to be taken off of metformin  after review of records In [facility] as well as Care everywhere patient had a baseline creatinine of 0.9-1.1 mg/dL up until April 2021 however since January 2022 new baseline creatinine around 1.3 to 1.6 mg/dL.   Most recent labs show a Creatinine of 1.45 mg/dL on 2//25. Renal function remains stable.  Check blood work in 3 months and then again prior to next visit  Likely has underlying CKD due to diabetic kidney disease (evidence of retinopathy) plus hypertensive nephrosclerosis plus age-related nephron loss plus repeated IV contrast exposure plus some component of underestimation of renal function based on current MDRD formula.  Imaging: CT abdomen/21/24 no hydronephrosis subcentimeter right renal cyst.  Nonobstructing right renal nephrolithiasis up to 3 mm.  Recommend to avoid use of NSAIDs, nephrotoxins. Caution advised with regards to exposure to IV contrast dye.   Discussed with the patient in depth his renal status, including the possible etiologies for CKD.   Advised the patient that when his GFR is close to 20mL/min then will start discussing about RRT(renal replacement therapy) options such as renal transplant, peritoneal dialysis and hemodialysis.   Informed the patient about the various options for Renal Replacement therapy.  Discussed with the patient how we need to work together to delay the progression of CKD with optimal BP control based on their age and co-morbidities, optimal BS control with HbA1c of <7% and trying to reduce proteinuria by the use of anti-proteinuric agents.   CKD education/Kidney smart: Referral was placed 2/14/2023 as per  patient he is attended.  Follow-up: Patient is to follow-up in 6 months, with lab work to be performed in 3 months and then again in a few days prior to the next visit. Advised patient to call me in 10 days to review the results if they do not hear back from me, as I may have not received the results.  Orders:  •  PTH, intact; Future  •  Renal function panel; Future  •  Vitamin D 25 hydroxy; Future  •  PTH, intact; Future  •  Renal function panel; Future  •  Vitamin D 25 hydroxy; Future  •  Albumin / creatinine urine ratio; Future  •  Magnesium; Future  •  Phosphorus; Future

## 2025-04-03 NOTE — ASSESSMENT & PLAN NOTE
likely has underlying proteinuria due to diabetic kidney disease  most recent MACR is 7 mg/dL as of May 2024  Recheck MACR prior to next visit  For proteinuria reduction continue on simvastatin, losartan

## 2025-04-03 NOTE — PATIENT INSTRUCTIONS
"Get blood work in 3months    Please call me in 10 days after having your blood work done to review the results if you do not hear back from me or my office, as I may have not received the results.  please remember to perform blood work prior to the next visit.  Please call if the blood pressure top number is greater than 140 or less than 110 consistently.  Please call if you are gaining more than 2lbs in 2 days for adjustment of water pills.  ~ Please AVOID the following pain medications.  LIST OF NSAIDS (NONSTEROIDAL ANTI-INFLAMMATORY DRUGS) AND GUZMÁN-2 INHIBITORS    DIFLUNISAL (DOLOBID)  IBUPROFEN (MOTRIN, ADVIL)  FLURBIPROFEN (ANSAID)  KETOPROFEN (ORUDIS, ORUVAIL)  FENOPROFEN (NALFON)  NABUMETONE (RELAFEN)  PIROXICAM (FELDENE)  NAPROXEN (ALEVE, NAPROSYN, NAPRELAN, ANAPROX)  DICLOFENAC (VOLTAREN, CATAFLAM)  INDOMETHACIN (INDOCIN)  SULINDAC (CLINORIL)  TOLMETIN (TOLETIN)  ETODOLAC (LODINE)  MELOXICAM (MOBIC)  KETOROLAC (TORADOL)  OXAPROZIN (DAYPRO)  CELECOXIB (CELEBREX)  Phosphorus diet  Follow a low phosphorus diet.    Avoid these higher phosphorus foods: Choose these lower phosphorus foods:   Milk, pudding or yogurt (from animals and from many soy varieties) Rice milk (unfortified), nondairy creamer (if it doesn't have terms in the ingredients list that contain the letters \"phos\")   Hard cheeses, ricotta or cottage cheese, fat-free cream cheese Regular and low-fat cream cheese   Ice cream or frozen yogurt Sherbet or frozen fruit pops   Soups made with higher phosphorus ingredients (milk, dried peas, beans, lentils) Soups made with lower phosphorus ingredients (broth- or water-based with other lower phosphorus ingredients)   Whole grains, including whole-grain breads, crackers, cereal, rice and pasta Refined grains, including white bread, crackers, cereals, rice and pasta   Quick breads, biscuits, cornbread, muffins, pancakes or waffles Homemade refined (white) dinner rolls, bagels or English muffins   Dried peas " "(split, black-eyed), beans (black, garbanzo, lima, kidney, navy, calzada) or lentils Green peas (canned, frozen), green beans or wax beans   Organ meats, walleye, pollock or sardines Lean beef, pork, lamb, poultry or other fish   Nuts and seeds Popcorn   Peanut butter and other nut butters Jam, jelly or honey   Chocolate, including chocolate drinks Carob (chocolate-flavored) candy, hard candy or gumdrops   Liset and pepper-type sodas, flavored arellano, bottled teas (if a term in the ingredients list contains the letters \"phos\") Lemon-lime soda, ginger ale or root beer, plain water   Follow a moderate potassium diet.        "

## 2025-04-03 NOTE — ASSESSMENT & PLAN NOTE
Lab Results   Component Value Date    EGFR 42 02/04/2025    EGFR 39 08/26/2024    EGFR 42 05/21/2024    CREATININE 1.45 (H) 02/04/2025    CREATININE 1.55 (H) 08/26/2024    CREATININE 1.47 (H) 05/21/2024     Based on patients CKD stage following is the goal of therapy.  Maintain calcium phosphorus product of < 55.  Stage 3 CKD - Goal Ca 8.5-10 mg/dL , goal Phos 2.7-4.6 mg/dL  , goal iPTH 30-70 pg/m  Currently on: Calcitrol 0.25 mcg p.o. daily  Changes made today: None continue to monitor calciums are borderline we will not increase calcitriol at this time  most recent ipth 123.5 and vit D 53.9 on 2/4/25  Check iPTH vitamin D prior to next visit and in 3 months

## 2025-04-03 NOTE — ASSESSMENT & PLAN NOTE
Lab Results   Component Value Date    EGFR 42 02/04/2025    EGFR 39 08/26/2024    EGFR 42 05/21/2024    CREATININE 1.45 (H) 02/04/2025    CREATININE 1.55 (H) 08/26/2024    CREATININE 1.47 (H) 05/21/2024     BP Readings from Last 3 Encounters:   04/03/25 110/68   02/12/25 122/60   09/11/24 118/60     Current medications: Losartan 25 mg p.o. daily, Norvasc 5 mg p.o. daily  Changes made today: Blood pressures volume status stable no medication changes for today  Goal BP of < 130/80 based on age and comorbidities  Instructed to follow low sodium (2gm)diet.  Advised to hold ACEI/ARBs if patient suffers from dehydration due to gastrointestinal losses due to risk of AMOL secondary to failure to autoregulate.  after review of records In [facility] as well as Care everywhere patient had a baseline creatinine of 0.9-1.1 mg/dL up until April 2021 however since January 2022 new baseline creatinine around 1.3 to 1.6 mg/dL.   Most recent labs show a Creatinine of 1.45 mg/dL on 2//25. Renal function remains stable.  Check blood work in 3 months and then again prior to next visit  Likely has underlying CKD due to diabetic kidney disease (evidence of retinopathy) plus hypertensive nephrosclerosis plus age-related nephron loss plus repeated IV contrast exposure plus some component of underestimation of renal function based on current MDRD formula.  Imaging: CT abdomen/21/24 no hydronephrosis subcentimeter right renal cyst.  Nonobstructing right renal nephrolithiasis up to 3 mm.  Recommend to avoid use of NSAIDs, nephrotoxins. Caution advised with regards to exposure to IV contrast dye.   Discussed with the patient in depth his renal status, including the possible etiologies for CKD.   Advised the patient that when his GFR is close to 20mL/min then will start discussing about RRT(renal replacement therapy) options such as renal transplant, peritoneal dialysis and hemodialysis.   Informed the patient about the various options for Renal  Replacement therapy.  Discussed with the patient how we need to work together to delay the progression of CKD with optimal BP control based on their age and co-morbidities, optimal BS control with HbA1c of <7% and trying to reduce proteinuria by the use of anti-proteinuric agents.   CKD education/Kidney smart: Referral was placed 2/14/2023 as per patient he is attended.  Follow-up: Patient is to follow-up in 6 months, with lab work to be performed in 3 months and then again in a few days prior to the next visit. Advised patient to call me in 10 days to review the results if they do not hear back from me, as I may have not received the results.  Orders:  •  PTH, intact; Future  •  Renal function panel; Future  •  Vitamin D 25 hydroxy; Future  •  PTH, intact; Future  •  Renal function panel; Future  •  Vitamin D 25 hydroxy; Future  •  Albumin / creatinine urine ratio; Future  •  Magnesium; Future  •  Phosphorus; Future

## 2025-04-03 NOTE — PROGRESS NOTES
Name: Dayday Buckley      : 1935      MRN: [id number]  Encounter Provider: Tess Rosenberg MD  Encounter Date: 4/3/2025   Encounter department: [facility]  :89 y.o.  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          Assessment & Plan  Type 2 diabetes mellitus with stage 3b chronic kidney disease, without long-term current use of insulin (HCC)    Lab Results   Component Value Date    HGBA1C 6.5 2025   Most recent A1c at 6.5% on 2025  Hemoglobin A1c as high as 6.8% on 10/17/2019  Advised patient of tight glycemic control as that will help delay CKD progression  Currently on: Metformin  Discussed with patient if renal parameters continue deteriorate he will need to be taken off of metformin  after review of records In [facility] as well as Care everywhere patient had a baseline creatinine of 0.9-1.1 mg/dL up until 2021 however since 2022 new baseline creatinine around 1.3 to 1.6 mg/dL.   Most recent labs show a Creatinine of 1.45 mg/dL on . Renal function remains stable.  Check blood work in 3 months and then again prior to next visit  Likely has underlying CKD due to diabetic kidney disease (evidence of retinopathy) plus hypertensive nephrosclerosis plus age-related nephron loss plus repeated IV contrast exposure plus some component of underestimation of renal function based on current MDRD formula.  Imaging: CT abdomen/ no hydronephrosis subcentimeter right renal cyst.  Nonobstructing right renal nephrolithiasis up to 3 mm.  Recommend to avoid use of  NSAIDs, nephrotoxins. Caution advised with regards to exposure to IV contrast dye.   Discussed with the patient in depth his renal status, including the possible etiologies for CKD.   Advised the patient that when his GFR is close to 20mL/min then will start discussing about RRT(renal replacement therapy) options such as renal transplant, peritoneal dialysis and hemodialysis.   Informed the patient about the various options for Renal Replacement therapy.  Discussed with the patient how we need to work together to delay the progression of CKD with optimal BP control based on their age and co-morbidities, optimal BS control with HbA1c of <7% and trying to reduce proteinuria by the use of anti-proteinuric agents.   CKD education/Kidney smart: Referral was placed 2/14/2023 as per patient he is attended.  Follow-up: Patient is to follow-up in 6 months, with lab work to be performed in 3 months and then again in a few days prior to the next visit. Advised patient to call me in 10 days to review the results if they do not hear back from me, as I may have not received the results.  Orders:  •  PTH, intact; Future  •  Renal function panel; Future  •  Vitamin D 25 hydroxy; Future  •  PTH, intact; Future  •  Renal function panel; Future  •  Vitamin D 25 hydroxy; Future  •  Albumin / creatinine urine ratio; Future  •  Magnesium; Future  •  Phosphorus; Future    Benign hypertension with CKD (chronic kidney disease) stage III ([facility])  Lab Results   Component Value Date    EGFR 42 02/04/2025    EGFR 39 08/26/2024    EGFR 42 05/21/2024    CREATININE 1.45 (H) 02/04/2025    CREATININE 1.55 (H) 08/26/2024    CREATININE 1.47 (H) 05/21/2024     BP Readings from Last 3 Encounters:   04/03/25 110/68   02/12/25 122/60   09/11/24 118/60     Current medications: Losartan 25 mg p.o. daily, Norvasc 5 mg p.o. daily  Changes made today: Blood pressures volume status stable no medication changes for today  Goal BP of < 130/80 based on age and  comorbidities  Instructed to follow low sodium (2gm)diet.  Advised to hold ACEI/ARBs if patient suffers from dehydration due to gastrointestinal losses due to risk of AMOL secondary to failure to autoregulate.  after review of records In [facility] as well as Care everywhere patient had a baseline creatinine of 0.9-1.1 mg/dL up until April 2021 however since January 2022 new baseline creatinine around 1.3 to 1.6 mg/dL.   Most recent labs show a Creatinine of 1.45 mg/dL on 2//25. Renal function remains stable.  Check blood work in 3 months and then again prior to next visit  Likely has underlying CKD due to diabetic kidney disease (evidence of retinopathy) plus hypertensive nephrosclerosis plus age-related nephron loss plus repeated IV contrast exposure plus some component of underestimation of renal function based on current MDRD formula.  Imaging: CT abdomen/21/24 no hydronephrosis subcentimeter right renal cyst.  Nonobstructing right renal nephrolithiasis up to 3 mm.  Recommend to avoid use of NSAIDs, nephrotoxins. Caution advised with regards to exposure to IV contrast dye.   Discussed with the patient in depth his renal status, including the possible etiologies for CKD.   Advised the patient that when his GFR is close to 20mL/min then will start discussing about RRT(renal replacement therapy) options such as renal transplant, peritoneal dialysis and hemodialysis.   Informed the patient about the various options for Renal Replacement therapy.  Discussed with the patient how we need to work together to delay the progression of CKD with optimal BP control based on their age and co-morbidities, optimal BS control with HbA1c of <7% and trying to reduce proteinuria by the use of anti-proteinuric agents.   CKD education/Kidney smart: Referral was placed 2/14/2023 as per patient he is attended.  Follow-up: Patient is to follow-up in 6 months, with lab work to be performed in 3 months and then again in a few days prior to the  next visit. Advised patient to call me in 10 days to review the results if they do not hear back from me, as I may have not received the results.  Orders:  •  PTH, intact; Future  •  Renal function panel; Future  •  Vitamin D 25 hydroxy; Future  •  PTH, intact; Future  •  Renal function panel; Future  •  Vitamin D 25 hydroxy; Future  •  Albumin / creatinine urine ratio; Future  •  Magnesium; Future  •  Phosphorus; Future    Chronic kidney disease-mineral and bone disorder (CKD-MBD)  Lab Results   Component Value Date    EGFR 42 02/04/2025    EGFR 39 08/26/2024    EGFR 42 05/21/2024    CREATININE 1.45 (H) 02/04/2025    CREATININE 1.55 (H) 08/26/2024    CREATININE 1.47 (H) 05/21/2024     Based on patients CKD stage following is the goal of therapy.  Maintain calcium phosphorus product of < 55.  Stage 3 CKD - Goal Ca 8.5-10 mg/dL , goal Phos 2.7-4.6 mg/dL  , goal iPTH 30-70 pg/m  Currently on: Calcitrol 0.25 mcg p.o. daily  Changes made today: None continue to monitor calciums are borderline we will not increase calcitriol at this time  most recent ipth 123.5 and vit D 53.9 on 2/4/25  Check iPTH vitamin D prior to next visit and in 3 months  Persistent proteinuria    likely has underlying proteinuria due to diabetic kidney disease  most recent MACR is 7 mg/dL as of May 2024  Recheck MACR prior to next visit  For proteinuria reduction continue on simvastatin, losartan    Pure hypercholesterolemia  Goal LDL less than 70  Continue on simvastatin     Orders:  •  PTH, intact; Future  •  Renal function panel; Future  •  Vitamin D 25 hydroxy; Future  •  PTH, intact; Future  •  Renal function panel; Future  •  Vitamin D 25 hydroxy; Future  •  Albumin / creatinine urine ratio; Future  •  Magnesium; Future  •  Phosphorus; Future        History of Present Illness   JORDON Buckley is a 89 y.o. male who presents to the office with spouse feels well has no complaints no recent hospitalization no issues with edema happy to hear  renal parameters stable thankful for the care information that he is gone today.  Will be going for EGD soon.      Review of Systems   Constitutional:  Negative for chills and fever.   HENT:  Negative for congestion.    Respiratory:  Negative for cough and shortness of breath.    Cardiovascular:  Negative for leg swelling.   Gastrointestinal:  Negative for constipation and diarrhea.   Genitourinary:  Negative for dysuria and hematuria.   Musculoskeletal:  Negative for back pain.   Neurological:  Negative for dizziness, light-headedness and headaches.   Psychiatric/Behavioral:  Negative for agitation and confusion.    All other systems reviewed and are negative.         Objective   /68 (BP Location: Left arm, Patient Position: Sitting, Cuff Size: Adult)   Pulse (!) 52   Wt 65.3 kg (144 lb)   SpO2 99%   BMI 20.66 kg/m²      Physical Exam  Vitals reviewed.   Constitutional:       General: He is not in acute distress.     Appearance: Normal appearance. He is normal weight. He is not ill-appearing, toxic-appearing or diaphoretic.   HENT:      Head: Normocephalic and atraumatic.      Mouth/Throat:      Mouth: Mucous membranes are moist.      Pharynx: No oropharyngeal exudate.   Eyes:      General: No scleral icterus.  Cardiovascular:      Rate and Rhythm: Normal rate.   Pulmonary:      Effort: No respiratory distress.      Breath sounds: Normal breath sounds. No stridor. No wheezing.   Abdominal:      General: There is no distension.      Palpations: There is no mass.      Tenderness: There is no right CVA tenderness or left CVA tenderness.   Musculoskeletal:         General: No swelling.      Cervical back: Normal range of motion. No rigidity.   Skin:     Coloration: Skin is not jaundiced.   Neurological:      General: No focal deficit present.      Mental Status: He is alert and oriented to person, place, and time.   Psychiatric:         Mood and Affect: Mood normal.         Behavior: Behavior normal.

## 2025-04-15 ENCOUNTER — CLINICAL SUPPORT (OUTPATIENT)
Dept: FAMILY MEDICINE CLINIC | Facility: CLINIC | Age: 89
End: 2025-04-15
Payer: COMMERCIAL

## 2025-04-15 DIAGNOSIS — E53.8 VITAMIN B12 DEFICIENCY: Primary | ICD-10-CM

## 2025-04-15 PROCEDURE — 96372 THER/PROPH/DIAG INJ SC/IM: CPT

## 2025-04-15 RX ADMIN — CYANOCOBALAMIN 1000 MCG: 1000 INJECTION, SOLUTION INTRAMUSCULAR; SUBCUTANEOUS at 10:17

## 2025-04-16 ENCOUNTER — HOSPITAL ENCOUNTER (OUTPATIENT)
Dept: CT IMAGING | Facility: HOSPITAL | Age: 89
Discharge: HOME | End: 2025-04-16
Payer: COMMERCIAL

## 2025-04-16 DIAGNOSIS — Z85.01 PERSONAL HISTORY OF ESOPHAGEAL CANCER: ICD-10-CM

## 2025-04-16 PROCEDURE — 74176 CT ABD & PELVIS W/O CONTRAST: CPT

## 2025-04-16 PROCEDURE — 71250 CT THORAX DX C-: CPT

## 2025-04-18 DIAGNOSIS — N13.8 BPH WITH URINARY OBSTRUCTION: ICD-10-CM

## 2025-04-18 DIAGNOSIS — N40.1 BPH WITH URINARY OBSTRUCTION: ICD-10-CM

## 2025-04-18 RX ORDER — TAMSULOSIN HYDROCHLORIDE 0.4 MG/1
0.4 CAPSULE ORAL
Qty: 90 CAPSULE | Refills: 1 | Status: SHIPPED | OUTPATIENT
Start: 2025-04-18

## 2025-04-20 ENCOUNTER — RESULTS FOLLOW-UP (OUTPATIENT)
Dept: FAMILY MEDICINE CLINIC | Facility: CLINIC | Age: 89
End: 2025-04-20

## 2025-04-21 DIAGNOSIS — N18.32 STAGE 3B CHRONIC KIDNEY DISEASE (HCC): ICD-10-CM

## 2025-04-21 DIAGNOSIS — E55.9 VITAMIN D DEFICIENCY: ICD-10-CM

## 2025-04-21 DIAGNOSIS — N25.81 SECONDARY HYPERPARATHYROIDISM OF RENAL ORIGIN (HCC): ICD-10-CM

## 2025-04-21 DIAGNOSIS — I10 ESSENTIAL HYPERTENSION: ICD-10-CM

## 2025-04-21 DIAGNOSIS — C15.5 PRIMARY ADENOCARCINOMA OF DISTAL THIRD OF ESOPHAGUS (HCC): ICD-10-CM

## 2025-04-21 DIAGNOSIS — E78.00 PURE HYPERCHOLESTEROLEMIA: ICD-10-CM

## 2025-04-21 DIAGNOSIS — E11.3293 TYPE 2 DIABETES MELLITUS WITH BOTH EYES AFFECTED BY MILD NONPROLIFERATIVE RETINOPATHY WITHOUT MACULAR EDEMA, WITHOUT LONG-TERM CURRENT USE OF INSULIN (HCC): ICD-10-CM

## 2025-04-21 RX ORDER — AMLODIPINE BESYLATE 5 MG/1
5 TABLET ORAL DAILY
Qty: 90 TABLET | Refills: 1 | Status: SHIPPED | OUTPATIENT
Start: 2025-04-21

## 2025-05-04 DIAGNOSIS — N40.1 BPH WITH URINARY OBSTRUCTION: ICD-10-CM

## 2025-05-04 DIAGNOSIS — N13.8 BPH WITH URINARY OBSTRUCTION: ICD-10-CM

## 2025-05-05 DIAGNOSIS — N40.1 BPH WITH URINARY OBSTRUCTION: ICD-10-CM

## 2025-05-05 DIAGNOSIS — N13.8 BPH WITH URINARY OBSTRUCTION: ICD-10-CM

## 2025-05-05 RX ORDER — FINASTERIDE 5 MG/1
5 TABLET, FILM COATED ORAL DAILY
Qty: 90 TABLET | Refills: 3 | OUTPATIENT
Start: 2025-05-05

## 2025-05-05 NOTE — TELEPHONE ENCOUNTER
Reason for call:   [x] Refill   [] Prior Auth  [] Other:     Office:   [] PCP/Provider -   [x] Specialty/Provider - Uro, Jovita    Medication:   Finasteride 5 mg, 1 qd, 90    Pharmacy:   [facility]   Does the patient have enough for 3 days?   [x] Yes   [] No - Send as HP to POD    [facility] Pharm[facility]   Does the patient have enough for 10 days?   [] Yes   [] No - Send as HP to POD;

## 2025-05-06 RX ORDER — FINASTERIDE 5 MG/1
5 TABLET, FILM COATED ORAL DAILY
Qty: 90 TABLET | Refills: 1 | Status: SHIPPED | OUTPATIENT
Start: 2025-05-06 | End: 2025-05-12 | Stop reason: SDUPTHER

## 2025-05-06 NOTE — TELEPHONE ENCOUNTER
Refill must be reviewed and completed by the office or provider. The refill is unable to be approved or denied by the medication management team.    Patient not seen > 2 year

## 2025-05-12 ENCOUNTER — TELEPHONE (OUTPATIENT)
Age: 89
End: 2025-05-12

## 2025-05-12 ENCOUNTER — OFFICE VISIT (OUTPATIENT)
Dept: UROLOGY | Facility: MEDICAL CENTER | Age: 89
End: 2025-05-12
Payer: COMMERCIAL

## 2025-05-12 VITALS
DIASTOLIC BLOOD PRESSURE: 62 MMHG | HEART RATE: 52 BPM | OXYGEN SATURATION: 98 % | BODY MASS INDEX: 20.52 KG/M2 | SYSTOLIC BLOOD PRESSURE: 120 MMHG | WEIGHT: 143 LBS

## 2025-05-12 DIAGNOSIS — N40.0 BPH WITHOUT URINARY OBSTRUCTION OR LOWER URINARY TRACT SYMPTOMS: Primary | ICD-10-CM

## 2025-05-12 PROCEDURE — 99213 OFFICE O/P EST LOW 20 MIN: CPT

## 2025-05-12 RX ORDER — FINASTERIDE 5 MG/1
5 TABLET, FILM COATED ORAL DAILY
Qty: 90 TABLET | Refills: 3 | Status: SHIPPED | OUTPATIENT
Start: 2025-05-12

## 2025-05-12 NOTE — ASSESSMENT & PLAN NOTE
Managed effectively with finasteride and tamsulosin 0.4 mg daily  Provided refills today.  Follow up yearly or as needed.  Orders:    finasteride (PROSCAR) 5 mg tablet; Take 1 tablet (5 mg total) by mouth daily

## 2025-05-12 NOTE — PROGRESS NOTES
Name: Dayday Buckley      : 1935      MRN: [id number]  Encounter Provider: SONDRA Good  Encounter Date: 2025   Encounter department: [facility]  :  Assessment & Plan  BPH without urinary obstruction or lower urinary tract symptoms  Managed effectively with finasteride and tamsulosin 0.4 mg daily  Provided refills today.  Follow up yearly or as needed.  Orders:    finasteride (PROSCAR) 5 mg tablet; Take 1 tablet (5 mg total) by mouth daily        History of Present Illness   Dayday Buckley is a 89 y.o. male who presents for follow-up of BPH.  Patient has been managed on finasteride and tamsulosin with good control for years.  He was last seen in the office in  and is here for follow-up for medication refills.  Denies any current frequency, urgency, feelings of incomplete bladder emptying, or gross hematuria.    AUA SYMPTOM SCORE      Flowsheet Row Most Recent Value   AUA SYMPTOM SCORE    How often have you had a sensation of not emptying your bladder completely after you finished urinating? 2 (P)     How often have you had to urinate again less than two hours after you finished urinating? 1 (P)     How often have you found you stopped and started again several times when you urinate? 0 (P)     How often have you found it difficult to postpone urination? 1 (P)     How often have you had a weak urinary stream? 1 (P)     How often have you had to push or strain to begin urination? 0 (P)     How many times did you most typically get up to urinate from the time you went to bed at night until the time you got up in the morning? 5 (P)     Quality of Life: If you were to spend the rest of your life with your urinary condition just the way it is now, how would you feel about that? 2 (P)     AUA SYMPTOM SCORE 10 (P)            Review of Systems   Constitutional:  Negative for chills and fever.   Gastrointestinal:  Negative for abdominal pain.   Genitourinary:  Negative for  difficulty urinating, dysuria, flank pain, frequency, hematuria and urgency.          Objective   There were no vitals taken for this visit.    Physical Exam  Vitals reviewed.   Constitutional:       General: He is not in acute distress.     Appearance: Normal appearance. He is normal weight. He is not toxic-appearing.   HENT:      Head: Normocephalic and atraumatic.   Eyes:      Conjunctiva/sclera: Conjunctivae normal.   Pulmonary:      Effort: Pulmonary effort is normal.   Musculoskeletal:         General: Normal range of motion.   Neurological:      Mental Status: He is alert and oriented to person, place, and time.   Psychiatric:         Mood and Affect: Mood normal.         Behavior: Behavior normal.           Results   Lab Results   Component Value Date    PSA 0.2 08/13/2020     Lab Results   Component Value Date    CALCIUM 9.5 02/04/2025    K 4.7 02/04/2025    CO2 29 02/04/2025     02/04/2025    BUN 23 02/04/2025    CREATININE 1.45 (H) 02/04/2025     Lab Results   Component Value Date    WBC 6.83 02/04/2025    HGB 11.6 (L) 02/04/2025    HCT 36.4 (L) 02/04/2025    MCV 94 02/04/2025     02/04/2025       Office Urine Dip  No results found for this or any previous visit (from the past hour).

## 2025-05-12 NOTE — TELEPHONE ENCOUNTER
Patient called requesting B12 injection(s) for the following reason(s): .     No order in system. Please advise and/or schedule accordingly.

## 2025-05-20 ENCOUNTER — CLINICAL SUPPORT (OUTPATIENT)
Dept: FAMILY MEDICINE CLINIC | Facility: CLINIC | Age: 89
End: 2025-05-20
Payer: COMMERCIAL

## 2025-05-20 DIAGNOSIS — E53.8 VITAMIN B12 DEFICIENCY: Primary | ICD-10-CM

## 2025-05-20 PROCEDURE — 96372 THER/PROPH/DIAG INJ SC/IM: CPT

## 2025-05-20 RX ADMIN — CYANOCOBALAMIN 1000 MCG: 1000 INJECTION, SOLUTION INTRAMUSCULAR; SUBCUTANEOUS at 14:35

## 2025-05-20 NOTE — PROGRESS NOTES
Monthly B12 injection administered in Right deltoid.  Patient tolerated injection well and left office in no distress.   
Statement Selected

## 2025-06-23 ENCOUNTER — APPOINTMENT (OUTPATIENT)
Dept: LAB | Facility: CLINIC | Age: 89
End: 2025-06-23
Payer: COMMERCIAL

## 2025-06-23 DIAGNOSIS — N18.32 TYPE 2 DIABETES MELLITUS WITH STAGE 3B CHRONIC KIDNEY DISEASE, WITHOUT LONG-TERM CURRENT USE OF INSULIN (HCC): ICD-10-CM

## 2025-06-23 DIAGNOSIS — E78.00 PURE HYPERCHOLESTEROLEMIA: ICD-10-CM

## 2025-06-23 DIAGNOSIS — E83.9 CHRONIC KIDNEY DISEASE-MINERAL AND BONE DISORDER (CKD-MBD): ICD-10-CM

## 2025-06-23 DIAGNOSIS — R80.1 PERSISTENT PROTEINURIA: ICD-10-CM

## 2025-06-23 DIAGNOSIS — I12.9 BENIGN HYPERTENSION WITH CKD (CHRONIC KIDNEY DISEASE) STAGE III (HCC): ICD-10-CM

## 2025-06-23 DIAGNOSIS — M89.9 CHRONIC KIDNEY DISEASE-MINERAL AND BONE DISORDER (CKD-MBD): ICD-10-CM

## 2025-06-23 DIAGNOSIS — N18.30 BENIGN HYPERTENSION WITH CKD (CHRONIC KIDNEY DISEASE) STAGE III (HCC): ICD-10-CM

## 2025-06-23 DIAGNOSIS — N18.9 CHRONIC KIDNEY DISEASE-MINERAL AND BONE DISORDER (CKD-MBD): ICD-10-CM

## 2025-06-23 DIAGNOSIS — E11.22 TYPE 2 DIABETES MELLITUS WITH STAGE 3B CHRONIC KIDNEY DISEASE, WITHOUT LONG-TERM CURRENT USE OF INSULIN (HCC): ICD-10-CM

## 2025-06-23 LAB
25(OH)D3 SERPL-MCNC: 33.3 NG/ML (ref 30–100)
ALBUMIN SERPL BCG-MCNC: 4 G/DL (ref 3.5–5)
ANION GAP SERPL CALCULATED.3IONS-SCNC: 9 MMOL/L (ref 4–13)
BUN SERPL-MCNC: 23 MG/DL (ref 5–25)
CALCIUM SERPL-MCNC: 8.9 MG/DL (ref 8.4–10.2)
CHLORIDE SERPL-SCNC: 106 MMOL/L (ref 96–108)
CO2 SERPL-SCNC: 26 MMOL/L (ref 21–32)
CREAT SERPL-MCNC: 1.46 MG/DL (ref 0.6–1.3)
GFR SERPL CREATININE-BSD FRML MDRD: 42 ML/MIN/1.73SQ M
GLUCOSE P FAST SERPL-MCNC: 100 MG/DL (ref 65–99)
PHOSPHATE SERPL-MCNC: 3.8 MG/DL (ref 2.3–4.1)
POTASSIUM SERPL-SCNC: 4.1 MMOL/L (ref 3.5–5.3)
PTH-INTACT SERPL-MCNC: 111.8 PG/ML (ref 12–88)
SODIUM SERPL-SCNC: 141 MMOL/L (ref 135–147)

## 2025-06-23 PROCEDURE — 83970 ASSAY OF PARATHORMONE: CPT

## 2025-06-23 PROCEDURE — 36415 COLL VENOUS BLD VENIPUNCTURE: CPT

## 2025-06-23 PROCEDURE — 82306 VITAMIN D 25 HYDROXY: CPT

## 2025-06-25 ENCOUNTER — OFFICE VISIT (OUTPATIENT)
Dept: FAMILY MEDICINE CLINIC | Facility: CLINIC | Age: 89
End: 2025-06-25
Payer: COMMERCIAL

## 2025-06-25 VITALS
DIASTOLIC BLOOD PRESSURE: 66 MMHG | HEART RATE: 94 BPM | HEIGHT: 70 IN | WEIGHT: 144 LBS | OXYGEN SATURATION: 97 % | BODY MASS INDEX: 20.62 KG/M2 | SYSTOLIC BLOOD PRESSURE: 122 MMHG

## 2025-06-25 DIAGNOSIS — E53.8 B12 DEFICIENCY: ICD-10-CM

## 2025-06-25 DIAGNOSIS — E11.22 TYPE 2 DIABETES MELLITUS WITH CHRONIC KIDNEY DISEASE, WITHOUT LONG-TERM CURRENT USE OF INSULIN, UNSPECIFIED CKD STAGE (HCC): ICD-10-CM

## 2025-06-25 DIAGNOSIS — Z00.00 MEDICARE ANNUAL WELLNESS VISIT, SUBSEQUENT: ICD-10-CM

## 2025-06-25 DIAGNOSIS — N40.1 BPH WITH URINARY OBSTRUCTION: ICD-10-CM

## 2025-06-25 DIAGNOSIS — N13.8 BPH WITH URINARY OBSTRUCTION: ICD-10-CM

## 2025-06-25 DIAGNOSIS — Z85.01 PERSONAL HISTORY OF ESOPHAGEAL CANCER: ICD-10-CM

## 2025-06-25 DIAGNOSIS — N18.30 BENIGN HYPERTENSION WITH CKD (CHRONIC KIDNEY DISEASE) STAGE III (HCC): Primary | ICD-10-CM

## 2025-06-25 DIAGNOSIS — N25.81 SECONDARY HYPERPARATHYROIDISM OF RENAL ORIGIN (HCC): ICD-10-CM

## 2025-06-25 DIAGNOSIS — I12.9 BENIGN HYPERTENSION WITH CKD (CHRONIC KIDNEY DISEASE) STAGE III (HCC): Primary | ICD-10-CM

## 2025-06-25 PROCEDURE — 99214 OFFICE O/P EST MOD 30 MIN: CPT | Performed by: FAMILY MEDICINE

## 2025-06-25 PROCEDURE — G0439 PPPS, SUBSEQ VISIT: HCPCS | Performed by: FAMILY MEDICINE

## 2025-06-25 PROCEDURE — 96372 THER/PROPH/DIAG INJ SC/IM: CPT | Performed by: FAMILY MEDICINE

## 2025-06-25 RX ORDER — TAMSULOSIN HYDROCHLORIDE 0.4 MG/1
0.4 CAPSULE ORAL
Qty: 90 CAPSULE | Refills: 1 | Status: SHIPPED | OUTPATIENT
Start: 2025-06-25

## 2025-06-25 RX ADMIN — CYANOCOBALAMIN 1000 MCG: 1000 INJECTION, SOLUTION INTRAMUSCULAR; SUBCUTANEOUS at 11:45

## 2025-06-25 NOTE — ASSESSMENT & PLAN NOTE
Patient symptoms are very well-controlled on current regimen.  Recent consultation with urology read and appreciated.  Continue current dosages of finasteride and tamsulosin.  I will take over his future refills.  Orders:  •  tamsulosin (FLOMAX) 0.4 mg; Take 1 capsule (0.4 mg total) by mouth daily with dinner

## 2025-06-25 NOTE — PROGRESS NOTES
Name: Dayday Buckley      : 1935      MRN: [id number]  Encounter Provider: Desirae Alarcon MD  Encounter Date: 2025   Encounter department: [facility]IMARY [facility]  :    Mr. Cadet is a pleasant 89-year-old gentleman who presents today with his wife to review chronic medical conditions.  He has recent blood work to review that was ordered by his nephrologist.  He reports feeling well and offers no acute complaints today.  He had a recent visit with his urologist and was told that he does not need to return for further intervention or surveillance, and that his medications can be prescribed by his PCP.    Patient inquires today about whether he needs to continue IM B12 injections, or whether he can take sublingual supplements instead.  He continues to get regular EGD surveillance for his personal history of esophageal cancer.  He notes that Dr. Salgado will be retiring and has a follow-up appointment with Dr. Bradley.  He and his wife are interested in other options for continued GI follow-up.    He is due for an AWV today.  Assessment & Plan  Benign hypertension with CKD (chronic kidney disease) stage III (HCC)  Lab Results   Component Value Date    EGFR 42 2025    EGFR 42 2025    EGFR 39 2024    CREATININE 1.46 (H) 2025    CREATININE 1.45 (H) 2025    CREATININE 1.55 (H) 2024     Labs remained stable.  He is encouraged to remain well-hydrated and to continue with small sips of water throughout the day, especially during these warm summer months.  Follow-up with Dr. Rosenberg.  Continue to avoid nephrotoxic agents, including contrast dye.  He is regular CT surveillance is done without contrast.       Type 2 diabetes mellitus with chronic kidney disease, without long-term current use of insulin, unspecified CKD stage (HCC)    Lab Results   Component Value Date    HGBA1C 6.5 2025     Patient's diabetes is under excellent control.  He remains on metformin 500 mg  daily.  After his esophageal surgery he lost a significant amount of weight and his sugars improved dramatically.       Secondary hyperparathyroidism of renal origin (HCC)  Follow-up with Dr. Rosenberg  Recent PTH is 111 - stable.  Cont. Calcitriol.  Any further changes in regimen is as per nephrology.       B12 deficiency  Vitamin B12 level earlier this year was 614.  He has remained on monthly IM injections.  I think it is reasonable at this time, to switch over to sublingual supplements.  We will keep track of his B12 levels to see if he is maintaining absorption.  Orders:  •  Methylcobalamin 1000 MCG SUBL; Place 1 tablet (1,000 mcg total) under the tongue in the morning    BPH with urinary obstruction  Patient symptoms are very well-controlled on current regimen.  Recent consultation with urology read and appreciated.  Continue current dosages of finasteride and tamsulosin.  I will take over his future refills.  Orders:  •  tamsulosin (FLOMAX) 0.4 mg; Take 1 capsule (0.4 mg total) by mouth daily with dinner    Personal history of esophageal cancer  Patient continues with annual surveillance including CT scans and EGDs.  His gastroenterologist, Dr. Salgado is retiring and his next EGD in the fall is scheduled with Dr. rBadley.  He is interested in [facility].  Referral given.  Orders:  •  Ambulatory Referral to Gastroenterology; Future    Medicare annual wellness visit, subsequent  Labs UTD  Immun reviewed -recommended Shingrix to be given at his local pharmacy.  Advanced directives completed        Labs printed and reviewed with patient today.  Return to office 4 months.    Preventive health issues were discussed with patient, and age appropriate screening tests were ordered as noted in patient's After Visit Summary. Personalized health advice and appropriate referrals for health education or preventive services given if needed, as noted in patient's After Visit Summary.    History of Present Illness     Chief  Complaint   Patient presents with   • Follow-up     Urology wants PCP to prescribe Tamsulosin 0.4mg and Finasteride 5mg    • Medicare Wellness Visit       Mr. Cadet is a pleasant 89-year-old gentleman who presents today with his wife to review chronic medical conditions.  He has recent blood work to review that was ordered by his nephrologist.  He reports feeling well and offers no acute complaints today.  He had a recent visit with his urologist and was told that he does not need to return for further intervention or surveillance, and that his medications can be prescribed by his PCP.    Patient inquires today about whether he needs to continue IM B12 injections, or whether he can take sublingual supplements instead.  He continues to get regular EGD surveillance for his personal history of esophageal cancer.  He notes that Dr. Salgado will be retiring and has a follow-up appointment with Dr. Bradley.  He and his wife are interested in other options for continued GI follow-up.    Due for AWV today.         Patient Care Team:  Desirae Alarcon MD as PCP - General (Family Medicine)  MD Tess Almanza MD (Nephrology)    Review of Systems   Constitutional:         See HPI   HENT:  Negative for congestion, ear pain, mouth sores, sinus pressure and trouble swallowing.    Eyes:  Negative for discharge, redness and itching.   Respiratory:  Negative for apnea, cough, chest tightness, shortness of breath, wheezing and stridor.    Cardiovascular:  Negative for chest pain, palpitations and leg swelling.   Gastrointestinal:  Negative for abdominal distention, abdominal pain, blood in stool, constipation, diarrhea, nausea and vomiting.        Continues to experience shortness of breath after eating.  This symptom has been present since his esophageal surgery.  His weight remains stable.   Endocrine: Negative for cold intolerance and heat intolerance.   Genitourinary:  Negative for difficulty urinating, dysuria, flank  pain and urgency.   Musculoskeletal:  Negative for arthralgias and myalgias.   Skin:  Negative for rash.   Neurological:  Negative for dizziness, seizures, syncope, speech difficulty, weakness, light-headedness, numbness and headaches.   Hematological:  Negative for adenopathy.   Psychiatric/Behavioral:  Negative for agitation, behavioral problems, confusion and sleep disturbance. The patient is not nervous/anxious.      Medical History Reviewed by provider this encounter:       Annual Wellness Visit Questionnaire   Dayday is here for his Subsequent Wellness visit.     Health Risk Assessment:   Patient rates overall health as very good. Patient feels that their physical health rating is same. Patient is very satisfied with their life. Eyesight was rated as slightly worse. Hearing was rated as slightly worse. Patient feels that their emotional and mental health rating is same. Patients states they are never, rarely angry. Patient states they are sometimes unusually tired/fatigued. Pain experienced in the last 7 days has been none. Patient states that he has experienced no weight loss or gain in last 6 months.     Depression Screening:   PHQ-2 Score: 0      Fall Risk Screening:   In the past year, patient has experienced: no history of falling in past year      Home Safety:  Patient does not have trouble with stairs inside or outside of their home. Patient has working smoke alarms and has working carbon monoxide detector. Home safety hazards include: none.     Nutrition:   Current diet is Regular.     Medications:   Patient is currently taking over-the-counter supplements. OTC medications include: see medication list. Patient is able to manage medications.     Activities of Daily Living (ADLs)/Instrumental Activities of Daily Living (IADLs):   Walk and transfer into and out of bed and chair?: Yes  Dress and groom yourself?: Yes    Bathe or shower yourself?: Yes    Feed yourself? Yes  Do your laundry/housekeeping?:  Yes  Manage your money, pay your bills and track your expenses?: Yes  Make your own meals?: Yes    Do your own shopping?: Yes    Previous Hospitalizations:   Any hospitalizations or ED visits within the last 12 months?: No      Advance Care Planning:   Living will: Yes    Durable POA for healthcare: Yes    Advanced directive: Yes      Cognitive Screening:   Provider or family/friend/caregiver concerned regarding cognition?: No    Preventive Screenings      Cardiovascular Screening:    General: Screening Not Indicated and History Lipid Disorder      Diabetes Screening:     General: Screening Not Indicated and History Diabetes      Colorectal Cancer Screening:     General: Screening Not Indicated      Prostate Cancer Screening:    General: Screening Not Indicated      Osteoporosis Screening:    General: Screening Not Indicated      Abdominal Aortic Aneurysm (AAA) Screening:    Risk factors include: tobacco use        Lung Cancer Screening:     General: Screening Not Indicated      Hepatitis C Screening:    General: Screening Not Indicated    Immunizations:  - Immunizations due: Zoster (Shingrix)    Screening, Brief Intervention, and Referral to Treatment (SBIRT)     Screening  Typical number of drinks in a day: 0  Typical number of drinks in a week: 0  Interpretation: Low risk drinking behavior.    Single Item Drug Screening:  How often have you used an illegal drug (including marijuana) or a prescription medication for non-medical reasons in the past year? never    Single Item Drug Screen Score: 0  Interpretation: Negative screen for possible drug use disorder    Social Drivers of Health     Financial Resource Strain: Low Risk  (3/8/2023)    Overall Financial Resource Strain (CARDIA)    • Difficulty of Paying Living Expenses: Not hard at all   Food Insecurity: No Food Insecurity (6/25/2025)    Nursing - Inadequate Food Risk Classification    • Worried About Running Out of Food in the Last Year: Never true    • Ran  "Out of Food in the Last Year: Never true   Transportation Needs: No Transportation Needs (6/25/2025)    PRAPARE - Transportation    • Lack of Transportation (Medical): No    • Lack of Transportation (Non-Medical): No   Housing Stability: Low Risk  (6/25/2025)    Housing Stability Vital Sign    • Unable to Pay for Housing in the Last Year: No    • Number of Times Moved in the Last Year: 0    • Homeless in the Last Year: No   Utilities: Not At Risk (6/25/2025)    [facility] Utilities    • Threatened with loss of utilities: No     No results found.    Objective   /66 (BP Location: Right arm, Patient Position: Sitting, Cuff Size: Standard)   Pulse 94   Ht 5' 10\" (1.778 m)   Wt 65.3 kg (144 lb)   SpO2 97%   BMI 20.66 kg/m²     Physical Exam  Vitals and nursing note reviewed.   Constitutional:       General: He is not in acute distress.     Appearance: Normal appearance. He is normal weight. He is not ill-appearing.      Comments: Thin 89-year-old gentleman in no apparent distress.  He is well-groomed, and an excellent historian.     Eyes:      General: No scleral icterus.    Neck:      Vascular: No carotid bruit.     Cardiovascular:      Rate and Rhythm: Normal rate and regular rhythm.      Heart sounds: Normal heart sounds. No murmur heard.     No friction rub. No gallop.   Pulmonary:      Effort: Pulmonary effort is normal. No respiratory distress.      Breath sounds: No stridor. No wheezing, rhonchi or rales.   Abdominal:      General: Abdomen is flat. There is no distension.      Palpations: Abdomen is soft. There is no mass.      Tenderness: There is no abdominal tenderness. There is no guarding or rebound.      Hernia: No hernia is present.     Musculoskeletal:      Cervical back: Normal range of motion.      Right lower leg: No edema.      Left lower leg: No edema.   Lymphadenopathy:      Cervical: No cervical adenopathy.     Skin:     General: Skin is warm.      Coloration: Skin is not jaundiced. "     Neurological:      General: No focal deficit present.      Mental Status: He is alert and oriented to person, place, and time.      Gait: Gait normal.     Psychiatric:         Mood and Affect: Mood normal.         Behavior: Behavior normal.         Thought Content: Thought content normal.         Judgment: Judgment normal.      Comments: Pleasant and cooperative, engaged in conversation.  Excellent eye contact.  Affect is normal.

## 2025-06-25 NOTE — ASSESSMENT & PLAN NOTE
Patient continues with annual surveillance including CT scans and EGDs.  His gastroenterologist, Dr. Salgado is retiring and his next EGD in the fall is scheduled with Dr. Bradley.  He is interested in [facility].  Referral given.  Orders:  •  Ambulatory Referral to Gastroenterology; Future

## 2025-06-25 NOTE — ASSESSMENT & PLAN NOTE
Lab Results   Component Value Date    EGFR 42 06/23/2025    EGFR 42 02/04/2025    EGFR 39 08/26/2024    CREATININE 1.46 (H) 06/23/2025    CREATININE 1.45 (H) 02/04/2025    CREATININE 1.55 (H) 08/26/2024     Labs remained stable.  He is encouraged to remain well-hydrated and to continue with small sips of water throughout the day, especially during these warm summer months.  Follow-up with Dr. Rosenberg.  Continue to avoid nephrotoxic agents, including contrast dye.  He is regular CT surveillance is done without contrast.

## 2025-06-25 NOTE — ASSESSMENT & PLAN NOTE
Lab Results   Component Value Date    EGFR 42 06/23/2025    EGFR 42 02/04/2025    EGFR 39 08/26/2024    CREATININE 1.46 (H) 06/23/2025    CREATININE 1.45 (H) 02/04/2025    CREATININE 1.55 (H) 08/26/2024   Dr. Rosenberg

## 2025-06-25 NOTE — ASSESSMENT & PLAN NOTE
Lab Results   Component Value Date    HGBA1C 6.5 02/12/2025     Patient's diabetes is under excellent control.  He remains on metformin 500 mg daily.  After his esophageal surgery he lost a significant amount of weight and his sugars improved dramatically.

## 2025-06-25 NOTE — PATIENT INSTRUCTIONS
Medicare Preventive Visit Patient Instructions  Thank you for completing your Welcome to Medicare Visit or Medicare Annual Wellness Visit today. Your next wellness visit will be due in one year (6/26/2026).  The screening/preventive services that you may require over the next 5-10 years are detailed below. Some tests may not apply to you based off risk factors and/or age. Screening tests ordered at today's visit but not completed yet may show as past due. Also, please note that scanned in results may not display below.  Preventive Screenings:  Service Recommendations Previous Testing/Comments   Colorectal Cancer Screening  Colonoscopy    Fecal Occult Blood Test (FOBT)/Fecal Immunochemical Test (FIT)  Fecal DNA/Cologuard Test  Flexible Sigmoidoscopy Age: 45-75 years old   Colonoscopy: every 10 years (May be performed more frequently if at higher risk)  OR  FOBT/FIT: every 1 year  OR  Cologuard: every 3 years  OR  Sigmoidoscopy: every 5 years  Screening may be recommended earlier than age 45 if at higher risk for colorectal cancer. Also, an individualized decision between you and your healthcare provider will decide whether screening between the ages of 76-85 would be appropriate. Colonoscopy: Not on file  FOBT/FIT: Not on file  Cologuard: Not on file  Sigmoidoscopy: Not on file    Screening Not Indicated     Prostate Cancer Screening Individualized decision between patient and health care provider in men between ages of 55-69   Medicare will cover every 12 months beginning on the day after your 50th birthday PSA: 0.2 ng/mL     Screening Not Indicated     Hepatitis C Screening Once for adults born between 1945 and 1965  More frequently in patients at high risk for Hepatitis C Hep C Antibody: Not on file        Diabetes Screening 1-2 times per year if you're at risk for diabetes or have pre-diabetes Fasting glucose: 100 mg/dL (6/23/2025)  A1C: 6.5 (2/12/2025)  Screening Not Indicated  History Diabetes   Cholesterol  Screening Once every 5 years if you don't have a lipid disorder. May order more often based on risk factors. Lipid panel: 02/04/2025  Screening Not Indicated  History Lipid Disorder      Other Preventive Screenings Covered by Medicare:  Abdominal Aortic Aneurysm (AAA) Screening: covered once if your at risk. You're considered to be at risk if you have a family history of AAA or a male between the age of 65-75 who smoking at least 100 cigarettes in your lifetime.  Lung Cancer Screening: covers low dose CT scan once per year if you meet all of the following conditions: (1) Age 55-77; (2) No signs or symptoms of lung cancer; (3) Current smoker or have quit smoking within the last 15 years; (4) You have a tobacco smoking history of at least 20 pack years (packs per day x number of years you smoked); (5) You get a written order from a healthcare provider.  Glaucoma Screening: covered annually if you're considered high risk: (1) You have diabetes OR (2) Family history of glaucoma OR (3)  aged 50 and older OR (4)  American aged 65 and older  Osteoporosis Screening: covered every 2 years if you meet one of the following conditions: (1) Have a vertebral abnormality; (2) On glucocorticoid therapy for more than 3 months; (3) Have primary hyperparathyroidism; (4) On osteoporosis medications and need to assess response to drug therapy.  HIV Screening: covered annually if you're between the age of 15-65. Also covered annually if you are younger than 15 and older than 65 with risk factors for HIV infection. For pregnant patients, it is covered up to 3 times per pregnancy.    Immunizations:  Immunization Recommendations   Influenza Vaccine Annual influenza vaccination during flu season is recommended for all persons aged >= 6 months who do not have contraindications   Pneumococcal Vaccine   * Pneumococcal conjugate vaccine = PCV13 (Prevnar 13), PCV15 (Vaxneuvance), PCV20 (Prevnar 20)  * Pneumococcal  polysaccharide vaccine = PPSV23 (Pneumovax) Adults 19-65 yo with certain risk factors or if 65+ yo  If never received any pneumonia vaccine: recommend Prevnar 20 (PCV20)  Give PCV20 if previously received 1 dose of PCV13 or PPSV23   Hepatitis B Vaccine 3 dose series if at intermediate or high risk (ex: diabetes, end stage renal disease, liver disease)   Respiratory syncytial virus (RSV) Vaccine - COVERED BY MEDICARE PART D  * RSVPreF3 (Arexvy) CDC recommends that adults 60 years of age and older may receive a single dose of RSV vaccine using shared clinical decision-making (SCDM)   Tetanus (Td) Vaccine - COST NOT COVERED BY MEDICARE PART B Following completion of primary series, a booster dose should be given every 10 years to maintain immunity against tetanus. Td may also be given as tetanus wound prophylaxis.   Tdap Vaccine - COST NOT COVERED BY MEDICARE PART B Recommended at least once for all adults. For pregnant patients, recommended with each pregnancy.   Shingles Vaccine (Shingrix) - COST NOT COVERED BY MEDICARE PART B  2 shot series recommended in those 19 years and older who have or will have weakened immune systems or those 50 years and older     Health Maintenance Due:  There are no preventive care reminders to display for this patient.  Immunizations Due:      Topic Date Due   • COVID-19 Vaccine (8 - 2024-25 season) 03/13/2025     Advance Directives   What are advance directives?  Advance directives are legal documents that state your wishes and plans for medical care. These plans are made ahead of time in case you lose your ability to make decisions for yourself. Advance directives can apply to any medical decision, such as the treatments you want, and if you want to donate organs.   What are the types of advance directives?  There are many types of advance directives, and each state has rules about how to use them. You may choose a combination of any of the following:  Living will:  This is a written  record of the treatment you want. You can also choose which treatments you do not want, which to limit, and which to stop at a certain time. This includes surgery, medicine, IV fluid, and tube feedings.   Durable power of  for healthcare (DPAHC):  This is a written record that states who you want to make healthcare choices for you when you are unable to make them for yourself. This person, called a proxy, is usually a family member or a friend. You may choose more than 1 proxy.  Do not resuscitate (DNR) order:  A DNR order is used in case your heart stops beating or you stop breathing. It is a request not to have certain forms of treatment, such as CPR. A DNR order may be included in other types of advance directives.  Medical directive:  This covers the care that you want if you are in a coma, near death, or unable to make decisions for yourself. You can list the treatments you want for each condition. Treatment may include pain medicine, surgery, blood transfusions, dialysis, IV or tube feedings, and a ventilator (breathing machine).  Values history:  This document has questions about your views, beliefs, and how you feel and think about life. This information can help others choose the care that you would choose.  Why are advance directives important?  An advance directive helps you control your care. Although spoken wishes may be used, it is better to have your wishes written down. Spoken wishes can be misunderstood, or not followed. Treatments may be given even if you do not want them. An advance directive may make it easier for your family to make difficult choices about your care.       © Copyright [employer] 2018 Information is for End User's use only and may not be sold, redistributed or otherwise used for commercial purposes. All illustrations and images included in CareNotes® are the copyrighted property of [employer] or [employer]

## 2025-06-25 NOTE — ASSESSMENT & PLAN NOTE
Follow-up with Dr. Rosenberg  Recent PTH is 111 - stable.  Cont. Calcitriol.  Any further changes in regimen is as per nephrology.